# Patient Record
Sex: MALE | Race: WHITE | NOT HISPANIC OR LATINO | Employment: OTHER | URBAN - METROPOLITAN AREA
[De-identification: names, ages, dates, MRNs, and addresses within clinical notes are randomized per-mention and may not be internally consistent; named-entity substitution may affect disease eponyms.]

---

## 2022-04-10 ENCOUNTER — HOSPITAL ENCOUNTER (EMERGENCY)
Facility: HOSPITAL | Age: 87
Discharge: HOME | End: 2022-04-10
Attending: EMERGENCY MEDICINE
Payer: MEDICARE

## 2022-04-10 VITALS
DIASTOLIC BLOOD PRESSURE: 89 MMHG | RESPIRATION RATE: 16 BRPM | HEART RATE: 90 BPM | TEMPERATURE: 98.2 F | SYSTOLIC BLOOD PRESSURE: 149 MMHG | OXYGEN SATURATION: 97 %

## 2022-04-10 DIAGNOSIS — L98.9 SKIN LESION OF LEFT LEG: Primary | ICD-10-CM

## 2022-04-10 DIAGNOSIS — R35.0 URINARY FREQUENCY: ICD-10-CM

## 2022-04-10 DIAGNOSIS — I87.2 VENOUS STASIS DERMATITIS OF BOTH LOWER EXTREMITIES: ICD-10-CM

## 2022-04-10 DIAGNOSIS — R82.81 PYURIA: ICD-10-CM

## 2022-04-10 DIAGNOSIS — H61.20 CERUMEN IMPACTION: ICD-10-CM

## 2022-04-10 DIAGNOSIS — R73.9 HYPERGLYCEMIA: ICD-10-CM

## 2022-04-10 LAB
ALBUMIN SERPL BCP-MCNC: 3 G/DL (ref 3.5–5)
ALP SERPL-CCNC: 93 U/L (ref 46–116)
ALT SERPL W P-5'-P-CCNC: 28 U/L (ref 12–78)
ANION GAP SERPL CALCULATED.3IONS-SCNC: 6 MMOL/L (ref 4–13)
AST SERPL W P-5'-P-CCNC: 44 U/L (ref 5–45)
BACTERIA UR QL AUTO: ABNORMAL /HPF
BASOPHILS # BLD AUTO: 0.09 THOUSANDS/ΜL (ref 0–0.1)
BASOPHILS NFR BLD AUTO: 1 % (ref 0–1)
BILIRUB SERPL-MCNC: 0.77 MG/DL (ref 0.2–1)
BILIRUB UR QL STRIP: NEGATIVE
BUN SERPL-MCNC: 12 MG/DL (ref 5–25)
CALCIUM ALBUM COR SERPL-MCNC: 9.5 MG/DL (ref 8.3–10.1)
CALCIUM SERPL-MCNC: 8.7 MG/DL (ref 8.3–10.1)
CHLORIDE SERPL-SCNC: 103 MMOL/L (ref 100–108)
CLARITY UR: ABNORMAL
CO2 SERPL-SCNC: 29 MMOL/L (ref 21–32)
COLOR UR: YELLOW
CREAT SERPL-MCNC: 0.96 MG/DL (ref 0.6–1.3)
EOSINOPHIL # BLD AUTO: 0.34 THOUSAND/ΜL (ref 0–0.61)
EOSINOPHIL NFR BLD AUTO: 4 % (ref 0–6)
ERYTHROCYTE [DISTWIDTH] IN BLOOD BY AUTOMATED COUNT: 13.2 % (ref 11.6–15.1)
GFR SERPL CREATININE-BSD FRML MDRD: 71 ML/MIN/1.73SQ M
GLUCOSE SERPL-MCNC: 176 MG/DL (ref 65–140)
GLUCOSE UR STRIP-MCNC: NEGATIVE MG/DL
HCT VFR BLD AUTO: 51.6 % (ref 36.5–49.3)
HGB BLD-MCNC: 16.8 G/DL (ref 12–17)
HGB UR QL STRIP.AUTO: ABNORMAL
IMM GRANULOCYTES # BLD AUTO: 0.09 THOUSAND/UL (ref 0–0.2)
IMM GRANULOCYTES NFR BLD AUTO: 1 % (ref 0–2)
KETONES UR STRIP-MCNC: NEGATIVE MG/DL
LEUKOCYTE ESTERASE UR QL STRIP: ABNORMAL
LYMPHOCYTES # BLD AUTO: 2.25 THOUSANDS/ΜL (ref 0.6–4.47)
LYMPHOCYTES NFR BLD AUTO: 23 % (ref 14–44)
MCH RBC QN AUTO: 29.9 PG (ref 26.8–34.3)
MCHC RBC AUTO-ENTMCNC: 32.6 G/DL (ref 31.4–37.4)
MCV RBC AUTO: 92 FL (ref 82–98)
MONOCYTES # BLD AUTO: 0.69 THOUSAND/ΜL (ref 0.17–1.22)
MONOCYTES NFR BLD AUTO: 7 % (ref 4–12)
NEUTROPHILS # BLD AUTO: 6.22 THOUSANDS/ΜL (ref 1.85–7.62)
NEUTS SEG NFR BLD AUTO: 64 % (ref 43–75)
NITRITE UR QL STRIP: POSITIVE
NON-SQ EPI CELLS URNS QL MICRO: ABNORMAL /HPF
NRBC BLD AUTO-RTO: 0 /100 WBCS
PH UR STRIP.AUTO: 8 [PH] (ref 4.5–8)
PLATELET # BLD AUTO: 250 THOUSANDS/UL (ref 149–390)
PMV BLD AUTO: 10.6 FL (ref 8.9–12.7)
POTASSIUM SERPL-SCNC: 4.1 MMOL/L (ref 3.5–5.3)
PROT SERPL-MCNC: 7.4 G/DL (ref 6.4–8.2)
PROT UR STRIP-MCNC: ABNORMAL MG/DL
RBC # BLD AUTO: 5.61 MILLION/UL (ref 3.88–5.62)
RBC #/AREA URNS AUTO: ABNORMAL /HPF
SODIUM SERPL-SCNC: 138 MMOL/L (ref 136–145)
SP GR UR STRIP.AUTO: 1.02 (ref 1–1.03)
UROBILINOGEN UR QL STRIP.AUTO: 0.2 E.U./DL
WBC # BLD AUTO: 9.68 THOUSAND/UL (ref 4.31–10.16)
WBC #/AREA URNS AUTO: ABNORMAL /HPF

## 2022-04-10 PROCEDURE — 80053 COMPREHEN METABOLIC PANEL: CPT | Performed by: PHYSICIAN ASSISTANT

## 2022-04-10 PROCEDURE — 87086 URINE CULTURE/COLONY COUNT: CPT

## 2022-04-10 PROCEDURE — 36415 COLL VENOUS BLD VENIPUNCTURE: CPT | Performed by: PHYSICIAN ASSISTANT

## 2022-04-10 PROCEDURE — 85025 COMPLETE CBC W/AUTO DIFF WBC: CPT | Performed by: PHYSICIAN ASSISTANT

## 2022-04-10 PROCEDURE — 99284 EMERGENCY DEPT VISIT MOD MDM: CPT | Performed by: PHYSICIAN ASSISTANT

## 2022-04-10 PROCEDURE — 99283 EMERGENCY DEPT VISIT LOW MDM: CPT

## 2022-04-10 PROCEDURE — 81001 URINALYSIS AUTO W/SCOPE: CPT

## 2022-04-10 RX ORDER — CEPHALEXIN 500 MG/1
500 CAPSULE ORAL EVERY 12 HOURS SCHEDULED
Qty: 10 CAPSULE | Refills: 0 | Status: SHIPPED | OUTPATIENT
Start: 2022-04-10 | End: 2022-04-15

## 2022-04-10 NOTE — DISCHARGE INSTRUCTIONS
Please refer to the attached information for strict return instructions  If symptoms worsen or new symptoms develop please return to the ER  Use prescribed antibiotic for full course  Please follow up with a primary care physician for re-evaluation of symptoms  Please schedule follow up with dermatology as soon as possible for possible biopsy of leg wound

## 2022-04-10 NOTE — ED PROVIDER NOTES
History  Chief Complaint   Patient presents with    Wound Check     pt has wound on left calf, states it has been there quite awhile  "it used to be a mole" states it is draining white/black  also has swelling bilaterally  pt has not taken any medications in two years, denies chest pain at this time  Mariangel Gomez is an 81 yo M presenting with lesion to L lower lower as well as chronic skin discoloration/thickening of bilateral lower legs/feet  He reports he has had a "mole" to L lower leg since he was a child, and notes it had been unchanged in appearance until late last year  He reports he used a "wart removing" topical medication OTC to area last year which did not seem to remove the lesion  He notes since that time it has been gradually enlarging and becoming more irregular in appearance  He notes occasional associated mild pain to area, none at this time  He reports he has also had intermittent, clear drainage from this lesion  No fevers/chills  No weight changes  No night sweats  He also reports chronic skin discoloration, thickening, and occasional intermittent swelling to bilateral lower legs  He notes this has been occurring for several years  The swelling is symmetric and tends to worsen with being upright  No recent injury/trauma  No current lower leg/foot pain, numbness, or tingling  He additionally reports urinary frequency, also chronic in nature  He reports sometimes needing to use the bathroom 1-2 times per hour and waking up frequently overnight for same  Denies dysuria or gross hematuria  Denies back/flank pain  He finally reports after inserting qtip into R ear canal several days ago he feels as though his hearing has been diminished on the R side  Denies pain  No drainage/discharge        History provided by:  Patient and relative   used: No        None       Past Medical History:   Diagnosis Date    Hx of heart artery stent        Past Surgical History: Procedure Laterality Date    BACK SURGERY         History reviewed  No pertinent family history  I have reviewed and agree with the history as documented  E-Cigarette/Vaping     E-Cigarette/Vaping Substances     Social History     Tobacco Use    Smoking status: Former Smoker    Smokeless tobacco: Not on file   Substance Use Topics    Alcohol use: Never    Drug use: Never       Review of Systems   Constitutional: Negative for chills and fever  HENT: Positive for hearing loss  Negative for congestion, ear discharge, ear pain, rhinorrhea, sinus pressure, sinus pain and sore throat  Eyes: Negative for pain and visual disturbance  Respiratory: Negative for cough, shortness of breath and wheezing  Cardiovascular: Negative for chest pain and palpitations  Gastrointestinal: Negative for abdominal pain, nausea and vomiting  Genitourinary: Positive for frequency  Negative for dysuria, flank pain, hematuria, penile discharge, testicular pain and urgency  Musculoskeletal: Negative for back pain, neck pain and neck stiffness  Skin: Positive for color change and wound  Negative for rash  Neurological: Negative for dizziness, weakness, light-headedness and numbness  Physical Exam  Physical Exam  Constitutional:       General: He is not in acute distress  Appearance: He is well-developed  He is not diaphoretic  HENT:      Head: Normocephalic and atraumatic  Right Ear: External ear normal  There is impacted cerumen  Left Ear: External ear normal  There is impacted cerumen  Eyes:      Conjunctiva/sclera: Conjunctivae normal       Pupils: Pupils are equal, round, and reactive to light  Cardiovascular:      Rate and Rhythm: Normal rate and regular rhythm  Heart sounds: Normal heart sounds  No murmur heard  No friction rub  No gallop  Pulmonary:      Effort: Pulmonary effort is normal  No respiratory distress  Breath sounds: Normal breath sounds  No wheezing  Abdominal:      General: There is no distension  Palpations: Abdomen is soft  Tenderness: There is no abdominal tenderness  There is no right CVA tenderness, left CVA tenderness or guarding  Musculoskeletal:      Cervical back: Normal range of motion and neck supple  Lymphadenopathy:      Cervical: No cervical adenopathy  Skin:     General: Skin is warm and dry  Capillary Refill: Capillary refill takes less than 2 seconds  Findings: Lesion and rash present  No erythema  Comments: Thickening and symmetric discoloration to bilateral lower legs/feet consistent with venous stasis dermatitis  1-2+, symmetric b/l posterior tibial and dorsalis pedis pulses b/l  Brisk cap refill  Papular lesion to L lower leg with irregular borders and mild surrounding erythema and skin peeling  No increased warmth or tenderness  Neurological:      Mental Status: He is alert and oriented to person, place, and time  Motor: No abnormal muscle tone  Coordination: Coordination normal    Psychiatric:         Behavior: Behavior normal          Thought Content:  Thought content normal          Judgment: Judgment normal            Vital Signs  ED Triage Vitals [04/10/22 1206]   Temperature Pulse Respirations Blood Pressure SpO2   98 2 °F (36 8 °C) 102 16 (!) 209/134 97 %      Temp Source Heart Rate Source Patient Position - Orthostatic VS BP Location FiO2 (%)   Oral Monitor Sitting Right arm --      Pain Score       --           Vitals:    04/10/22 1206 04/10/22 1258   BP: (!) 209/134 149/89   Pulse: 102 90   Patient Position - Orthostatic VS: Sitting Sitting         Visual Acuity      ED Medications  Medications - No data to display    Diagnostic Studies  Results Reviewed     Procedure Component Value Units Date/Time    Urine Microscopic [[id number]]  (Abnormal) Collected: 04/10/22 1325    Lab Status: Final result Specimen: Urine, Clean Catch Updated: 04/10/22 1440     RBC, UA Innumerable /hpf WBC, UA 30-50 /hpf      Epithelial Cells Occasional /hpf      Bacteria, UA Occasional /hpf     Urine culture [[id number]] Collected: 04/10/22 1325    Lab Status:  In process Specimen: Urine, Clean Catch Updated: 04/10/22 1440    Comprehensive metabolic panel [[id number]]  (Abnormal) Collected: 04/10/22 1314    Lab Status: Final result Specimen: Blood from Arm, Right Updated: 04/10/22 1339     Sodium 138 mmol/L      Potassium 4 1 mmol/L      Chloride 103 mmol/L      CO2 29 mmol/L      ANION GAP 6 mmol/L      BUN 12 mg/dL      Creatinine 0 96 mg/dL      Glucose 176 mg/dL      Calcium 8 7 mg/dL      Corrected Calcium 9 5 mg/dL      AST 44 U/L      ALT 28 U/L      Alkaline Phosphatase 93 U/L      Total Protein 7 4 g/dL      Albumin 3 0 g/dL      Total Bilirubin 0 77 mg/dL      eGFR 71 ml/min/1 73sq m     Narrative:      National Kidney Disease Foundation guidelines for Chronic Kidney Disease (CKD):     Stage 1 with normal or high GFR (GFR > 90 mL/min/1 73 square meters)    Stage 2 Mild CKD (GFR = 60-89 mL/min/1 73 square meters)    Stage 3A Moderate CKD (GFR = 45-59 mL/min/1 73 square meters)    Stage 3B Moderate CKD (GFR = 30-44 mL/min/1 73 square meters)    Stage 4 Severe CKD (GFR = 15-29 mL/min/1 73 square meters)    Stage 5 End Stage CKD (GFR <15 mL/min/1 73 square meters)  Note: GFR calculation is accurate only with a steady state creatinine    Urine Macroscopic, POC [[id number]]  (Abnormal) Collected: 04/10/22 1325    Lab Status: Final result Specimen: Urine Updated: 04/10/22 1326     Color, UA Yellow     Clarity, UA Slightly Cloudy     pH, UA 8 0     Leukocytes, UA Trace     Nitrite, UA Positive     Protein,  (2+) mg/dl      Glucose, UA Negative mg/dl      Ketones, UA Negative mg/dl      Urobilinogen, UA 0 2 E U /dl      Bilirubin, UA Negative     Blood, UA Large     Specific Gravity, UA 1 025    Narrative:      CLINITEK RESULT    CBC and differential [[id number]]  (Abnormal) Collected: 04/10/22 1314 Lab Status: Final result Specimen: Blood from Arm, Right Updated: 04/10/22 1320     WBC 9 68 Thousand/uL      RBC 5 61 Million/uL      Hemoglobin 16 8 g/dL      Hematocrit 51 6 %      MCV 92 fL      MCH 29 9 pg      MCHC 32 6 g/dL      RDW 13 2 %      MPV 10 6 fL      Platelets 734 Thousands/uL      nRBC 0 /100 WBCs      Neutrophils Relative 64 %      Immat GRANS % 1 %      Lymphocytes Relative 23 %      Monocytes Relative 7 %      Eosinophils Relative 4 %      Basophils Relative 1 %      Neutrophils Absolute 6 22 Thousands/µL      Immature Grans Absolute 0 09 Thousand/uL      Lymphocytes Absolute 2 25 Thousands/µL      Monocytes Absolute 0 69 Thousand/µL      Eosinophils Absolute 0 34 Thousand/µL      Basophils Absolute 0 09 Thousands/µL                  No orders to display              Procedures  Procedures         ED Course                                             MDM  Number of Diagnoses or Management Options  Cerumen impaction  Hyperglycemia  Pyuria  Skin lesion of left leg  Urinary frequency  Venous stasis dermatitis of both lower extremities  Diagnosis management comments: Chronic appearing skin changes c/w stasis dermatitis to b/l LE  Follow up with PCP recommended for same  Lesion to L lower leg with significant change in size and character since last year  This was discussed with Rea Peña, on call dermatology resident  Recommends may follow up with dermatology in office this week for consideration of biopsy - to call tomorrow to set this up  Patient informed of these recommendations as well as the possibility of this lesion being malignant  He verbalizes understanding of same and reports he is reluctant to follow up but understands the importance of seeing a dermatologist promptly and the risks if he does not  Pyuria noted on urine dip with +nitrites, 30-50 WBC's on urine micro along with RBC's  He does note ongoing urinary frequency however appears more chronic   Given pyuria, nitrites will provide keflex for possible UTI  Culture pending  Will provide debrox drops for partial cerumen impation  Portion of TM visualized and canal not fully occluded  Follow up with PCP encouraged  Amount and/or Complexity of Data Reviewed  Clinical lab tests: ordered and reviewed    Patient Progress  Patient progress: stable      Disposition  Final diagnoses:   Skin lesion of left leg   Venous stasis dermatitis of both lower extremities   Urinary frequency   Pyuria   Cerumen impaction   Hyperglycemia     Time reflects when diagnosis was documented in both MDM as applicable and the Disposition within this note     Time User Action Codes Description Comment    4/10/2022  2:19 PM Marcelene Specter Add [L98 9] Skin lesion of left leg     4/10/2022  2:19 PM Marcelene Specter Add [I87 2] Venous stasis dermatitis of both lower extremities     4/10/2022  2:19 PM Marcelene Specter Add [R35 0] Urinary frequency     4/10/2022  2:20 PM Marcelene Specter Add [R82 81] Pyuria     4/10/2022  2:21 PM Marcelene Specter Add [H61 20] Cerumen impaction     4/10/2022  8:03 PM Marcelene Specter Add [R73 9] Hyperglycemia       ED Disposition     ED Disposition Condition Date/Time Comment    Discharge Stable Sun Apr 10, 2022  2:19 PM Read James discharge to home/self care              Follow-up Information     Follow up With Specialties Details Why Contact Info Additional [address] [facility] Emergency Department Emergency Medicine  If symptoms worsen [facility] [phone] 313 [facility] Emergency Department, [address] [place] [address], 160 [facility] Dermatology Schedule an appointment as soon as possible for a visit   [address] [phone] Harper JACOME [address] [place] [address] 49 [facility] Family Medicine Schedule an appointment as soon as possible for a visit   [address] [facility] 305, [address] [facility] [id number]  [address] [facility] [address], [address] [facility] [address], [address], [address] [place] [address]          Discharge Medication List as of 4/10/2022  2:23 PM      START taking these medications    Details   carbamide peroxide (DEBROX) 6 5 % otic solution Administer 5 drops into ears 2 (two) times a day, Starting Sun 4/10/2022, Normal      cephalexin (KEFLEX) 500 mg capsule Take 1 capsule (500 mg total) by mouth every 12 (twelve) hours for 5 days, Starting Sun 4/10/2022, Until Fri 4/15/2022, Normal                 PDMP Review     None          ED Provider  Electronically Signed by           Joselito Castillo PA-C  04/10/22 2003

## 2022-04-13 LAB — BACTERIA UR CULT: NORMAL

## 2022-07-09 ENCOUNTER — APPOINTMENT (EMERGENCY)
Dept: RADIOLOGY | Facility: HOSPITAL | Age: 87
DRG: 602 | End: 2022-07-09
Payer: MEDICARE

## 2022-07-09 ENCOUNTER — HOSPITAL ENCOUNTER (INPATIENT)
Facility: HOSPITAL | Age: 87
LOS: 27 days | Discharge: NURSING, REHAB OR LONG-TERM CARE | DRG: 602 | End: 2022-08-05
Attending: EMERGENCY MEDICINE | Admitting: INTERNAL MEDICINE
Payer: MEDICARE

## 2022-07-09 ENCOUNTER — APPOINTMENT (EMERGENCY)
Dept: CT IMAGING | Facility: HOSPITAL | Age: 87
DRG: 602 | End: 2022-07-09
Payer: MEDICARE

## 2022-07-09 DIAGNOSIS — F32.A DEPRESSION: ICD-10-CM

## 2022-07-09 DIAGNOSIS — Z01.89 ENCOUNTER FOR ASSESSMENT OF DECISION-MAKING CAPACITY: ICD-10-CM

## 2022-07-09 DIAGNOSIS — R73.9 BLOOD GLUCOSE ELEVATED: ICD-10-CM

## 2022-07-09 DIAGNOSIS — I10 PRIMARY HYPERTENSION: ICD-10-CM

## 2022-07-09 DIAGNOSIS — E11.65 TYPE 2 DIABETES MELLITUS WITH HYPERGLYCEMIA, WITHOUT LONG-TERM CURRENT USE OF INSULIN (HCC): ICD-10-CM

## 2022-07-09 DIAGNOSIS — L03.90 CELLULITIS: ICD-10-CM

## 2022-07-09 DIAGNOSIS — A04.72 C. DIFFICILE COLITIS: ICD-10-CM

## 2022-07-09 DIAGNOSIS — C43.72 MALIGNANT MELANOMA OF LEG, LEFT (HCC): ICD-10-CM

## 2022-07-09 DIAGNOSIS — S81.802A WOUND OF LEFT LOWER EXTREMITY, INITIAL ENCOUNTER: Primary | ICD-10-CM

## 2022-07-09 PROBLEM — N17.9 AKI (ACUTE KIDNEY INJURY) (HCC): Status: ACTIVE | Noted: 2022-01-01

## 2022-07-09 PROBLEM — R22.40 LEG MASS: Status: ACTIVE | Noted: 2022-01-01

## 2022-07-09 LAB
ALBUMIN SERPL BCP-MCNC: 3.5 G/DL (ref 3.5–5)
ALP SERPL-CCNC: 79 U/L (ref 46–116)
ALT SERPL W P-5'-P-CCNC: 22 U/L (ref 12–78)
ANION GAP SERPL CALCULATED.3IONS-SCNC: 11 MMOL/L (ref 4–13)
APTT PPP: 33 SECONDS (ref 23–37)
AST SERPL W P-5'-P-CCNC: 19 U/L (ref 5–45)
BASOPHILS # BLD AUTO: 0.08 THOUSANDS/ΜL (ref 0–0.1)
BASOPHILS NFR BLD AUTO: 1 % (ref 0–1)
BILIRUB SERPL-MCNC: 0.96 MG/DL (ref 0.2–1)
BUN SERPL-MCNC: 17 MG/DL (ref 5–25)
CALCIUM SERPL-MCNC: 9 MG/DL (ref 8.3–10.1)
CHLORIDE SERPL-SCNC: 100 MMOL/L (ref 100–108)
CK SERPL-CCNC: 58 U/L (ref 39–308)
CO2 SERPL-SCNC: 30 MMOL/L (ref 21–32)
CREAT SERPL-MCNC: 1.56 MG/DL (ref 0.6–1.3)
EOSINOPHIL # BLD AUTO: 0.3 THOUSAND/ΜL (ref 0–0.61)
EOSINOPHIL NFR BLD AUTO: 3 % (ref 0–6)
ERYTHROCYTE [DISTWIDTH] IN BLOOD BY AUTOMATED COUNT: 13.4 % (ref 11.6–15.1)
EST. AVERAGE GLUCOSE BLD GHB EST-MCNC: 206 MG/DL
GFR SERPL CREATININE-BSD FRML MDRD: 39 ML/MIN/1.73SQ M
GLUCOSE SERPL-MCNC: 214 MG/DL (ref 65–140)
GLUCOSE SERPL-MCNC: 269 MG/DL (ref 65–140)
HBA1C MFR BLD: 8.8 %
HCT VFR BLD AUTO: 52.5 % (ref 36.5–49.3)
HGB BLD-MCNC: 17.1 G/DL (ref 12–17)
IMM GRANULOCYTES # BLD AUTO: 0.12 THOUSAND/UL (ref 0–0.2)
IMM GRANULOCYTES NFR BLD AUTO: 1 % (ref 0–2)
INR PPP: 1.04 (ref 0.84–1.19)
LACTATE SERPL-SCNC: 1.8 MMOL/L (ref 0.5–2)
LYMPHOCYTES # BLD AUTO: 1.93 THOUSANDS/ΜL (ref 0.6–4.47)
LYMPHOCYTES NFR BLD AUTO: 16 % (ref 14–44)
MCH RBC QN AUTO: 29.7 PG (ref 26.8–34.3)
MCHC RBC AUTO-ENTMCNC: 32.6 G/DL (ref 31.4–37.4)
MCV RBC AUTO: 91 FL (ref 82–98)
MONOCYTES # BLD AUTO: 0.94 THOUSAND/ΜL (ref 0.17–1.22)
MONOCYTES NFR BLD AUTO: 8 % (ref 4–12)
NEUTROPHILS # BLD AUTO: 8.68 THOUSANDS/ΜL (ref 1.85–7.62)
NEUTS SEG NFR BLD AUTO: 71 % (ref 43–75)
NRBC BLD AUTO-RTO: 0 /100 WBCS
PLATELET # BLD AUTO: 262 THOUSANDS/UL (ref 149–390)
PMV BLD AUTO: 10.9 FL (ref 8.9–12.7)
POTASSIUM SERPL-SCNC: 3.7 MMOL/L (ref 3.5–5.3)
PROT SERPL-MCNC: 8.7 G/DL (ref 6.4–8.2)
PROTHROMBIN TIME: 13.3 SECONDS (ref 11.6–14.5)
RBC # BLD AUTO: 5.76 MILLION/UL (ref 3.88–5.62)
SODIUM SERPL-SCNC: 141 MMOL/L (ref 136–145)
WBC # BLD AUTO: 12.05 THOUSAND/UL (ref 4.31–10.16)

## 2022-07-09 PROCEDURE — 99285 EMERGENCY DEPT VISIT HI MDM: CPT

## 2022-07-09 PROCEDURE — 85730 THROMBOPLASTIN TIME PARTIAL: CPT | Performed by: PHYSICIAN ASSISTANT

## 2022-07-09 PROCEDURE — 36415 COLL VENOUS BLD VENIPUNCTURE: CPT | Performed by: PHYSICIAN ASSISTANT

## 2022-07-09 PROCEDURE — 87040 BLOOD CULTURE FOR BACTERIA: CPT | Performed by: PHYSICIAN ASSISTANT

## 2022-07-09 PROCEDURE — 96375 TX/PRO/DX INJ NEW DRUG ADDON: CPT

## 2022-07-09 PROCEDURE — G1004 CDSM NDSC: HCPCS

## 2022-07-09 PROCEDURE — 99223 1ST HOSP IP/OBS HIGH 75: CPT | Performed by: INTERNAL MEDICINE

## 2022-07-09 PROCEDURE — 96366 THER/PROPH/DIAG IV INF ADDON: CPT

## 2022-07-09 PROCEDURE — 73590 X-RAY EXAM OF LOWER LEG: CPT

## 2022-07-09 PROCEDURE — 83036 HEMOGLOBIN GLYCOSYLATED A1C: CPT | Performed by: PHYSICIAN ASSISTANT

## 2022-07-09 PROCEDURE — 1123F ACP DISCUSS/DSCN MKR DOCD: CPT | Performed by: PHYSICIAN ASSISTANT

## 2022-07-09 PROCEDURE — 82550 ASSAY OF CK (CPK): CPT | Performed by: PHYSICIAN ASSISTANT

## 2022-07-09 PROCEDURE — 82948 REAGENT STRIP/BLOOD GLUCOSE: CPT

## 2022-07-09 PROCEDURE — 85025 COMPLETE CBC W/AUTO DIFF WBC: CPT | Performed by: PHYSICIAN ASSISTANT

## 2022-07-09 PROCEDURE — 99285 EMERGENCY DEPT VISIT HI MDM: CPT | Performed by: PHYSICIAN ASSISTANT

## 2022-07-09 PROCEDURE — 80053 COMPREHEN METABOLIC PANEL: CPT | Performed by: PHYSICIAN ASSISTANT

## 2022-07-09 PROCEDURE — 73700 CT LOWER EXTREMITY W/O DYE: CPT

## 2022-07-09 PROCEDURE — 85610 PROTHROMBIN TIME: CPT | Performed by: PHYSICIAN ASSISTANT

## 2022-07-09 PROCEDURE — 96365 THER/PROPH/DIAG IV INF INIT: CPT

## 2022-07-09 PROCEDURE — 83605 ASSAY OF LACTIC ACID: CPT | Performed by: PHYSICIAN ASSISTANT

## 2022-07-09 RX ORDER — INSULIN LISPRO 100 [IU]/ML
1-5 INJECTION, SOLUTION INTRAVENOUS; SUBCUTANEOUS
Status: DISCONTINUED | OUTPATIENT
Start: 2022-07-09 | End: 2022-07-19

## 2022-07-09 RX ORDER — SODIUM CHLORIDE 9 MG/ML
75 INJECTION, SOLUTION INTRAVENOUS CONTINUOUS
Status: DISCONTINUED | OUTPATIENT
Start: 2022-07-09 | End: 2022-07-10

## 2022-07-09 RX ORDER — HEPARIN SODIUM 5000 [USP'U]/ML
5000 INJECTION, SOLUTION INTRAVENOUS; SUBCUTANEOUS EVERY 8 HOURS SCHEDULED
Status: DISCONTINUED | OUTPATIENT
Start: 2022-07-09 | End: 2022-08-05 | Stop reason: HOSPADM

## 2022-07-09 RX ORDER — ONDANSETRON 2 MG/ML
4 INJECTION INTRAMUSCULAR; INTRAVENOUS EVERY 6 HOURS PRN
Status: DISCONTINUED | OUTPATIENT
Start: 2022-07-09 | End: 2022-07-21

## 2022-07-09 RX ORDER — HYDROMORPHONE HCL IN WATER/PF 6 MG/30 ML
0.2 PATIENT CONTROLLED ANALGESIA SYRINGE INTRAVENOUS EVERY 4 HOURS PRN
Status: DISCONTINUED | OUTPATIENT
Start: 2022-07-09 | End: 2022-07-09

## 2022-07-09 RX ORDER — HYDROMORPHONE HCL IN WATER/PF 6 MG/30 ML
0.2 PATIENT CONTROLLED ANALGESIA SYRINGE INTRAVENOUS EVERY 4 HOURS PRN
Status: DISCONTINUED | OUTPATIENT
Start: 2022-07-09 | End: 2022-07-19

## 2022-07-09 RX ORDER — OXYCODONE HYDROCHLORIDE 5 MG/1
2.5 TABLET ORAL EVERY 8 HOURS PRN
Status: DISCONTINUED | OUTPATIENT
Start: 2022-07-09 | End: 2022-08-05 | Stop reason: HOSPADM

## 2022-07-09 RX ORDER — ACETAMINOPHEN 325 MG/1
650 TABLET ORAL EVERY 6 HOURS PRN
Status: DISCONTINUED | OUTPATIENT
Start: 2022-07-09 | End: 2022-08-05 | Stop reason: HOSPADM

## 2022-07-09 RX ORDER — INSULIN LISPRO 100 [IU]/ML
1-5 INJECTION, SOLUTION INTRAVENOUS; SUBCUTANEOUS
Status: DISCONTINUED | OUTPATIENT
Start: 2022-07-10 | End: 2022-07-19

## 2022-07-09 RX ADMIN — PIPERACILLIN AND TAZOBACTAM 3.38 G: 3; .375 INJECTION, POWDER, LYOPHILIZED, FOR SOLUTION INTRAVENOUS at 22:57

## 2022-07-09 RX ADMIN — PIPERACILLIN SODIUM AND TAZOBACTAM SODIUM 3.38 G: 36; 4.5 INJECTION, POWDER, LYOPHILIZED, FOR SOLUTION INTRAVENOUS at 13:13

## 2022-07-09 RX ADMIN — MORPHINE SULFATE 2 MG: 2 INJECTION, SOLUTION INTRAMUSCULAR; INTRAVENOUS at 15:00

## 2022-07-09 RX ADMIN — SODIUM CHLORIDE 500 ML: 0.9 INJECTION, SOLUTION INTRAVENOUS at 13:14

## 2022-07-09 RX ADMIN — HYDROMORPHONE HYDROCHLORIDE 0.2 MG: 0.2 INJECTION, SOLUTION INTRAMUSCULAR; INTRAVENOUS; SUBCUTANEOUS at 21:44

## 2022-07-09 RX ADMIN — HEPARIN SODIUM 5000 UNITS: 5000 INJECTION INTRAVENOUS; SUBCUTANEOUS at 22:52

## 2022-07-09 RX ADMIN — OXYCODONE 2.5 MG: 5 TABLET ORAL at 22:57

## 2022-07-09 RX ADMIN — VANCOMYCIN HYDROCHLORIDE 1750 MG: 1 INJECTION, POWDER, LYOPHILIZED, FOR SOLUTION INTRAVENOUS at 23:56

## 2022-07-09 RX ADMIN — INSULIN LISPRO 2 UNITS: 100 INJECTION, SOLUTION INTRAVENOUS; SUBCUTANEOUS at 22:51

## 2022-07-09 RX ADMIN — SODIUM CHLORIDE 75 ML/HR: 0.9 INJECTION, SOLUTION INTRAVENOUS at 22:44

## 2022-07-09 NOTE — CONSULTS
Consultation - General Surgery  Iman Chisholm 82 y o  male MRN: [id number]  Unit/Bed#: ED 24 Encounter: [id number]        Assessment/Plan     Assessment:  Iman Chisholm is a 82 y o  male with PMH of cardiac stent and left lower leg skin lesion who presents with worsening pain LLE    Afebrile  Hypertensive  On Room air    WBC 12  Lactic acid 1 8    PE: venous stasis BL lower extremity, left leg with approximately 3 cm by 2 cm soft tissue mass, tender, cellulitis medial lower leg, no fluctuance or induration          Plan:   Recommend IV antibiotics to treat cellulitis   No indication for surgical debridement of cellulitis   Soft tissue mass will require biopsy when cellulitis resolves, refrain from biopsy until active infection clears    History of Present Illness     HPI:  Iman Chisholm is a 82 y o  male with above PMH who originally presented April of this year with complaints of left lower extremity soft tissue mass  Outpatient dermatology follow up was recommended for biopsy  Patient lost to follow up  Presents today with left lower leg pain  Per patient he wraps his left leg  On initial exam by ED maggots were seen when left leg dressing removed  Per family patient does not change his dressing and he lives alone  Family required to change dressing every 2 weeks when they come to visit patient  Upon discussion patient still in significant pain LLE  Denies fever, chills, nausea, or vomiting  Review of Systems   Constitutional: Negative for chills and fever  HENT: Negative for ear pain and sore throat  Eyes: Negative for pain and visual disturbance  Respiratory: Negative for cough and shortness of breath  Cardiovascular: Negative for chest pain and palpitations  Gastrointestinal: Negative for abdominal pain and vomiting  Genitourinary: Negative for dysuria and hematuria  Musculoskeletal: Negative for arthralgias and back pain     Skin: Positive for color change and wound  Negative for rash  Neurological: Negative for seizures and syncope  All other systems reviewed and are negative  Historical Information   Past Medical History:   Diagnosis Date    Hx of heart artery stent      Past Surgical History:   Procedure Laterality Date    BACK SURGERY       Social History   Social History     Substance and Sexual Activity   Alcohol Use Never     Social History     Substance and Sexual Activity   Drug Use Never     Social History     Tobacco Use   Smoking Status Former Smoker   Smokeless Tobacco Never Used     Family History: History reviewed  No pertinent family history  Meds/Allergies   all medications and allergies reviewed  No Known Allergies    Objective   First Vitals:   Blood Pressure: 168/77 (07/09/22 1100)  Pulse: 87 (07/09/22 1100)  Temperature: 97 9 °F (36 6 °C) (07/09/22 1100)  Temp Source: Oral (07/09/22 1100)  Respirations: 18 (07/09/22 1100)  SpO2: 97 % (07/09/22 1100)    Current Vitals:   Blood Pressure: (!) 196/91 (07/09/22 1457)  Pulse: 80 (07/09/22 1457)  Temperature: 97 9 °F (36 6 °C) (07/09/22 1100)  Temp Source: Oral (07/09/22 1100)  Respirations: 16 (07/09/22 1457)  SpO2: 97 % (07/09/22 1457)    No intake or output data in the 24 hours ending 07/09/22 1648    Invasive Devices  Report    Peripheral Intravenous Line  Duration           Peripheral IV 07/09/22 Right Antecubital <1 day                Physical Exam  Constitutional:       General: He is not in acute distress  Appearance: Normal appearance  He is not ill-appearing or toxic-appearing  HENT:      Head: Normocephalic and atraumatic  Right Ear: External ear normal       Left Ear: External ear normal       Nose: Nose normal       Mouth/Throat:      Mouth: Mucous membranes are moist    Eyes:      Pupils: Pupils are equal, round, and reactive to light  Cardiovascular:      Rate and Rhythm: Normal rate  Pulses: Normal pulses  Heart sounds: No murmur heard    Pulmonary: Effort: Pulmonary effort is normal  No respiratory distress  Abdominal:      General: Abdomen is flat  There is no distension  Tenderness: There is no abdominal tenderness  Musculoskeletal:         General: No swelling or tenderness  Normal range of motion  Cervical back: Normal range of motion  No rigidity  Skin:     General: Skin is warm and dry  Capillary Refill: Capillary refill takes less than 2 seconds  Findings: Lesion present  Comments: See assessment for skin exam   Neurological:      General: No focal deficit present  Mental Status: He is alert and oriented to person, place, and time  Psychiatric:         Mood and Affect: Mood normal          Behavior: Behavior normal            Lab Results: I have personally reviewed pertinent lab results  Imaging: I have personally reviewed pertinent reports  EKG, Pathology, and Other Studies: I have personally reviewed pertinent reports        Code Status: No Order  Advance Directive and Living Will:      Power of :    POLST:

## 2022-07-10 ENCOUNTER — APPOINTMENT (INPATIENT)
Dept: ULTRASOUND IMAGING | Facility: HOSPITAL | Age: 87
DRG: 602 | End: 2022-07-10
Payer: MEDICARE

## 2022-07-10 PROBLEM — E11.65 TYPE 2 DIABETES MELLITUS WITH HYPERGLYCEMIA, WITHOUT LONG-TERM CURRENT USE OF INSULIN (HCC): Status: ACTIVE | Noted: 2022-01-01

## 2022-07-10 LAB
ANION GAP SERPL CALCULATED.3IONS-SCNC: 10 MMOL/L (ref 4–13)
BACTERIA UR QL AUTO: ABNORMAL /HPF
BILIRUB UR QL STRIP: NEGATIVE
BUN SERPL-MCNC: 15 MG/DL (ref 5–25)
CALCIUM SERPL-MCNC: 8.2 MG/DL (ref 8.3–10.1)
CHLORIDE SERPL-SCNC: 104 MMOL/L (ref 100–108)
CLARITY UR: CLEAR
CO2 SERPL-SCNC: 28 MMOL/L (ref 21–32)
COLOR UR: YELLOW
CREAT SERPL-MCNC: 1.23 MG/DL (ref 0.6–1.3)
ERYTHROCYTE [DISTWIDTH] IN BLOOD BY AUTOMATED COUNT: 13.2 % (ref 11.6–15.1)
GFR SERPL CREATININE-BSD FRML MDRD: 53 ML/MIN/1.73SQ M
GLUCOSE SERPL-MCNC: 139 MG/DL (ref 65–140)
GLUCOSE SERPL-MCNC: 140 MG/DL (ref 65–140)
GLUCOSE SERPL-MCNC: 141 MG/DL (ref 65–140)
GLUCOSE SERPL-MCNC: 144 MG/DL (ref 65–140)
GLUCOSE SERPL-MCNC: 166 MG/DL (ref 65–140)
GLUCOSE SERPL-MCNC: 171 MG/DL (ref 65–140)
GLUCOSE UR STRIP-MCNC: NEGATIVE MG/DL
HCT VFR BLD AUTO: 50.1 % (ref 36.5–49.3)
HGB BLD-MCNC: 16.2 G/DL (ref 12–17)
HGB UR QL STRIP.AUTO: ABNORMAL
KETONES UR STRIP-MCNC: NEGATIVE MG/DL
LEUKOCYTE ESTERASE UR QL STRIP: NEGATIVE
MCH RBC QN AUTO: 29.7 PG (ref 26.8–34.3)
MCHC RBC AUTO-ENTMCNC: 32.3 G/DL (ref 31.4–37.4)
MCV RBC AUTO: 92 FL (ref 82–98)
NITRITE UR QL STRIP: NEGATIVE
NON-SQ EPI CELLS URNS QL MICRO: ABNORMAL /HPF
PH UR STRIP.AUTO: 6 [PH]
PLATELET # BLD AUTO: 243 THOUSANDS/UL (ref 149–390)
PMV BLD AUTO: 10.5 FL (ref 8.9–12.7)
POTASSIUM SERPL-SCNC: 3.4 MMOL/L (ref 3.5–5.3)
PROT UR STRIP-MCNC: NEGATIVE MG/DL
RBC # BLD AUTO: 5.46 MILLION/UL (ref 3.88–5.62)
RBC #/AREA URNS AUTO: ABNORMAL /HPF
SODIUM SERPL-SCNC: 142 MMOL/L (ref 136–145)
SP GR UR STRIP.AUTO: 1.02 (ref 1–1.03)
UROBILINOGEN UR QL STRIP.AUTO: 0.2 E.U./DL
WBC # BLD AUTO: 11.6 THOUSAND/UL (ref 4.31–10.16)
WBC #/AREA URNS AUTO: ABNORMAL /HPF

## 2022-07-10 PROCEDURE — 81001 URINALYSIS AUTO W/SCOPE: CPT | Performed by: INTERNAL MEDICINE

## 2022-07-10 PROCEDURE — NC001 PR NO CHARGE: Performed by: SURGERY

## 2022-07-10 PROCEDURE — 85027 COMPLETE CBC AUTOMATED: CPT | Performed by: INTERNAL MEDICINE

## 2022-07-10 PROCEDURE — 99222 1ST HOSP IP/OBS MODERATE 55: CPT | Performed by: SURGERY

## 2022-07-10 PROCEDURE — 80048 BASIC METABOLIC PNL TOTAL CA: CPT | Performed by: INTERNAL MEDICINE

## 2022-07-10 PROCEDURE — 76770 US EXAM ABDO BACK WALL COMP: CPT

## 2022-07-10 PROCEDURE — 99232 SBSQ HOSP IP/OBS MODERATE 35: CPT | Performed by: PHYSICIAN ASSISTANT

## 2022-07-10 PROCEDURE — 82948 REAGENT STRIP/BLOOD GLUCOSE: CPT

## 2022-07-10 RX ADMIN — VANCOMYCIN HYDROCHLORIDE 750 MG: 750 INJECTION, SOLUTION INTRAVENOUS at 23:03

## 2022-07-10 RX ADMIN — HYDROMORPHONE HYDROCHLORIDE 0.2 MG: 0.2 INJECTION, SOLUTION INTRAMUSCULAR; INTRAVENOUS; SUBCUTANEOUS at 02:40

## 2022-07-10 RX ADMIN — OXYCODONE 2.5 MG: 5 TABLET ORAL at 19:09

## 2022-07-10 RX ADMIN — HEPARIN SODIUM 5000 UNITS: 5000 INJECTION INTRAVENOUS; SUBCUTANEOUS at 13:05

## 2022-07-10 RX ADMIN — PIPERACILLIN AND TAZOBACTAM 3.38 G: 3; .375 INJECTION, POWDER, LYOPHILIZED, FOR SOLUTION INTRAVENOUS at 15:56

## 2022-07-10 RX ADMIN — INSULIN LISPRO 1 UNITS: 100 INJECTION, SOLUTION INTRAVENOUS; SUBCUTANEOUS at 11:32

## 2022-07-10 RX ADMIN — OXYCODONE 2.5 MG: 5 TABLET ORAL at 13:05

## 2022-07-10 RX ADMIN — HYDROMORPHONE HYDROCHLORIDE 0.2 MG: 0.2 INJECTION, SOLUTION INTRAMUSCULAR; INTRAVENOUS; SUBCUTANEOUS at 22:02

## 2022-07-10 RX ADMIN — PIPERACILLIN AND TAZOBACTAM 3.38 G: 3; .375 INJECTION, POWDER, LYOPHILIZED, FOR SOLUTION INTRAVENOUS at 06:03

## 2022-07-10 RX ADMIN — HEPARIN SODIUM 5000 UNITS: 5000 INJECTION INTRAVENOUS; SUBCUTANEOUS at 05:20

## 2022-07-10 RX ADMIN — PIPERACILLIN AND TAZOBACTAM 3.38 G: 3; .375 INJECTION, POWDER, LYOPHILIZED, FOR SOLUTION INTRAVENOUS at 22:06

## 2022-07-10 RX ADMIN — HEPARIN SODIUM 5000 UNITS: 5000 INJECTION INTRAVENOUS; SUBCUTANEOUS at 22:02

## 2022-07-10 NOTE — PROGRESS NOTES
Progress Note - General Surgery   Donald Munguia 82 y o  male MRN: [id number]  Unit/Bed#: E2 -01 Encounter: [id number]    Assessment:  Donald Munguia is a 82 y o  male LLE soft tissue mass and cellulitis     Afebrile, Stable VS on room air  PE: cellulitis stable, tender             Plan:  · IV antibiotics  · Follow blood cultures  · No indication for surgical debridement of cellulitis  · Soft tissue mass will require biopsy when cellulitis resolves, refrain from biopsy until active infection clears      Subjective/Objective     Subjective:   No acute events overnight  Continued pain LLE  Objective:     Blood pressure (!) 203/81, pulse 84, temperature 97 9 °F (36 6 °C), temperature source Oral, resp  rate 16, height 5' 9" (1 753 m), weight 91 5 kg (201 lb 11 5 oz), SpO2 100 %  ,Body mass index is 29 79 kg/m²      No intake or output data in the 24 hours ending 07/09/22 2007    Invasive Devices  Report    Peripheral Intravenous Line  Duration           Peripheral IV 07/09/22 Right Antecubital <1 day                Physical Exam:   Gen: NAD, Comfortable  Neuro: A&O, No focal deficits  Head: Normal Cephalic, Atraumatic  Eye: EOMI, PERRLA, No scleral icterus  Neck: Supple, No JVD, Midline trachea  CV: RRR, Cap refill <2 sec  Pulm: Normal work of breathing, no respiratory distress  Abd: Soft, Non-Distended, Non-Tender  Ext: No edema, Non-tender  Skin: cellulitis stable, tender

## 2022-07-10 NOTE — ASSESSMENT & PLAN NOTE
Left leg cellulitis with wounds  Maggots were found in the wound  Ct lower ext showed "Extensive subcutaneous edema likely representing cellulitis  Although evaluation for abscess is limited in the absence images contrast no obvious discrete collection is identified  Dermal soft tissue lesion seen in association with the posterior medial calf "  Given zosyn in the ed which will be continued and will add zosyn     Appreciate surgery recommendations

## 2022-07-10 NOTE — CASE MANAGEMENT
Case Management Discharge Planning Note    Patient name Carlos Vargas  Location [facility] 2 /E2 MS 5-* MRN [id number]  : 1935 Date 7/10/2022       Current Admission Date: 2022  Current Admission Diagnosis:Cellulitis   Patient Active Problem List    Diagnosis Date Noted    AKUA (acute kidney injury) ([facility] ) 2022    Cellulitis 2022    Leg mass 2022    Type 2 diabetes mellitus with hyperglycemia, without long-term current use of insulin ([facility] ) 2022      LOS (days): 1  Geometric Mean LOS (GMLOS) (days):   Days to GMLOS:     OBJECTIVE:  Risk of Unplanned Readmission Score: 8 64         Current admission status: Inpatient   Preferred Pharmacy:   [address] [place], [place] [address]  Phone: [phone] Fax: [phone]    Primary Care Provider: No primary care provider on file  Primary Insurance: MEDICARE  Secondary Insurance: [employer] [facility]    DISCHARGE DETAILS:                                          Other Referral/Resources/Interventions Provided:  Government Services[de-identified] Adult Protective Services / Elder Abuse (CM placed APS referral for concerns self-neglect d/t wound infested w/ maggots   Completed intake w/ on- Dolores

## 2022-07-10 NOTE — PROGRESS NOTES
[facility]  Progress Note - [place] Bello 1935, 82 y o  male MRN: [id number]  Unit/Bed#: E2 -01 Encounter: [id number]  Primary Care Provider: No primary care provider on file  Date and time admitted to hospital: 7/9/2022 11:02 AM    * Cellulitis  Assessment & Plan  Presented with left leg cellulitis, surrounding wounds, and growth on leg  Maggots were found in the wound upon presentation to the ED    Ct lower extremity: "Extensive subcutaneous edema likely representing cellulitis  Although evaluation for abscess is limited in the absence images contrast no obvious discrete collection is identified  Dermal soft tissue lesion seen in association with the posterior medial calf "    Currently on IV Zosyn and vancomycin-day # 2  Seen and evaluated by General surgery-mass can be biopsied when infection resolved  Blood cultures obtained and pending    Type 2 diabetes mellitus with hyperglycemia, without long-term current use of insulin ([facility])  Assessment & Plan  No history of diabetes  Blood glucose was 214 on bmp on admission  Hemoglobin A1c checked and is 8 8  Placed on insulin sliding scale here  Goal for better control of sugars in the setting of acute infection  Monitor sugars and consider long-acting insulin before bed    Leg mass  Assessment & Plan  Seen and evaluated by General surgery  Appears mass has been there for quite some time  Per surgery, can biopsy once cellulitis is improved   Pain control    AKUA (acute kidney injury) ([facility] [address] )  Assessment & Plan  Results from last 7 days   Lab Units 07/10/22  0517 07/09/22  1159   CREATININE mg/dL 1 23 1 56*   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  Baseline creatinine around 0 9  Presenting creatinine 1 56  Placed on IV fluids with improvement of creatinine  Renal ultrasound pending      VTE Pharmacologic Prophylaxis:   Pharmacologic: Heparin  Mechanical VTE Prophylaxis in Place: Yes    Discharge Plan:   With need for continued inpatient stay for IV antibiotics and control blood sugars  Likely another 24-48 hours    Discussions with Specialists or Other Care Team Provider:  Nursing    Education and Discussions with Family / Patient:  Patient, attempted to update daughter-unable to reach    Time Spent for Care: 30 minutes  More than 50% of total time spent on counseling and coordination of care as described above  Current Length of Stay: 1 day(s)  Current Patient Status: Inpatient   Code Status: Level 1 - Full Code    Subjective:   Patient resting in bed comfortably  Reports his leg was just re-wrapped this morning  Reviewed pictures under media tab  The pain is improving in his left leg  He lives at home alone  He is bothered by being on IV fluids and is requesting that they be stopped  Reports he is eating and drinking here  Attempted to update daughter via telephone-unable to reach  Objective:     Vitals:   Temp (24hrs), Av 7 °F (36 5 °C), Min:97 °F (36 1 °C), Max:98 4 °F (36 9 °C)    Temp:  [97 °F (36 1 °C)-98 4 °F (36 9 °C)] 97 °F (36 1 °C)  HR:  [80-85] 85  Resp:  [14-18] 18  BP: (166-207)/(78-91) 168/78  SpO2:  [95 %-100 %] 95 %  Body mass index is 29 79 kg/m²  Input and Output Summary (last 24 hours): Intake/Output Summary (Last 24 hours) at 7/10/2022 1259  Last data filed at 7/10/2022 1213  Gross per 24 hour   Intake --   Output 1075 ml   Net -1075 ml       Physical Exam:     Physical Exam  Vitals and nursing note reviewed  Constitutional:       General: He is not in acute distress  Appearance: Normal appearance  He is obese  He is not ill-appearing, toxic-appearing or diaphoretic  HENT:      Head: Normocephalic and atraumatic  Eyes:      General: No scleral icterus  Cardiovascular:      Rate and Rhythm: Normal rate and regular rhythm  Pulmonary:      Effort: Pulmonary effort is normal  No respiratory distress  Breath sounds: Normal breath sounds  No stridor  No wheezing or rhonchi     Abdominal: General: There is no distension  Palpations: There is no mass  Tenderness: There is no abdominal tenderness  Hernia: No hernia is present  Musculoskeletal:         General: No swelling  Cervical back: Neck supple  Comments: Reviewed image under media tab-left leg Ace wrapped upon evaluation   Skin:     General: Skin is warm and dry  Neurological:      Mental Status: He is alert and oriented to person, place, and time  Mental status is at baseline  Psychiatric:         Mood and Affect: Mood normal          Behavior: Behavior normal          Additional Data:     Labs:    Results from last 7 days   Lab Units 07/10/22  0517 07/09/22  1159   WBC Thousand/uL 11 60* 12 05*   HEMOGLOBIN g/dL 16 2 17 1*   HEMATOCRIT % 50 1* 52 5*   PLATELETS Thousands/uL 243 262   NEUTROS PCT %  --  71   LYMPHS PCT %  --  16   MONOS PCT %  --  8   EOS PCT %  --  3     Results from last 7 days   Lab Units 07/10/22  0517 07/09/22  1159   POTASSIUM mmol/L 3 4* 3 7   CHLORIDE mmol/L 104 100   CO2 mmol/L 28 30   BUN mg/dL 15 17   CREATININE mg/dL 1 23 1 56*   CALCIUM mg/dL 8 2* 9 0   ALK PHOS U/L  --  79   ALT U/L  --  22   AST U/L  --  19     Results from last 7 days   Lab Units 07/09/22  1159   INR  1 04       * I Have Reviewed All Lab Data Listed Above  * Additional Pertinent Lab Tests Reviewed: [facility]    Imaging:    Imaging Reports Reviewed Today Include:  CT lower extremity, tibia/fibula x-ray  Imaging Personally Reviewed by Myself Includes:      Recent Cultures (last 7 days):     Results from last 7 days   Lab Units 07/09/22  1159   BLOOD CULTURE  Received in Microbiology Lab  Culture in Progress  Received in Microbiology Lab  Culture in Progress         Last 24 Hours Medication List:   Current Facility-Administered Medications   Medication Dose Route Frequency Provider Last Rate    acetaminophen  650 mg Oral Q6H PRN Vanessa Kim DO      heparin (porcine)  5,000 Units Subcutaneous Q8H [address] Vanessa Ambron, DO      HYDROmorphone  0 2 mg Intravenous Q4H PRN Vanessa Ambron, DO      insulin lispro  1-5 Units Subcutaneous TID AC Vanessa Ambron, DO      insulin lispro  1-5 Units Subcutaneous HS Vanessa Ambron, DO      ondansetron  4 mg Intravenous Q6H PRN Vanessa Ambron, DO      oxyCODONE  2 5 mg Oral Q8H PRN Vanessa Ambron, DO      piperacillin-tazobactam  3 375 g Intravenous Q8H Vanessa Ambron, DO 3 375 g (07/10/22 0603)    vancomycin  750 mg Intravenous Q24H Haydee Gowers, DO          Today, Patient Was Seen By: Wendi Platt PA-C    ** Please Note: This note has been constructed using a voice recognition system   **

## 2022-07-10 NOTE — PLAN OF CARE
Problem: Potential for Falls  Goal: Patient will remain free of falls  Description: INTERVENTIONS:  - Educate patient/family on patient safety including physical limitations  - Instruct patient to call for assistance with activity   - Consult OT/PT to assist with strengthening/mobility   - Keep Call bell within reach  - Keep bed low and locked with side rails adjusted as appropriate  - Keep care items and personal belongings within reach  - Initiate and maintain comfort rounds  - Make Fall Risk Sign visible to staff  - Offer Toileting every 2  Hours, in advance of need  - Initiate/Maintain bed alarm  - Obtain necessary fall risk management equipment: bed alarm  - Apply yellow socks and bracelet for high fall risk patients  - Consider moving patient to room near nurses station  Outcome: Progressing     Problem: Prexisting or High Potential for Compromised Skin Integrity  Goal: Skin integrity is maintained or improved  Description: INTERVENTIONS:  - Identify patients at risk for skin breakdown  - Assess and monitor skin integrity  - Assess and monitor nutrition and hydration status  - Monitor labs   - Assess for incontinence   - Turn and reposition patient  - Assist with mobility/ambulation  - Relieve pressure over bony prominences  - Avoid friction and shearing  - Provide appropriate hygiene as needed including keeping skin clean and dry  - Evaluate need for skin moisturizer/barrier cream  - Collaborate with interdisciplinary team   - Patient/family teaching  - Consider wound care consult   Outcome: Progressing

## 2022-07-10 NOTE — PLAN OF CARE
Problem: Potential for Falls  Goal: Patient will remain free of falls  Description: INTERVENTIONS:  - Educate patient/family on patient safety including physical limitations  - Instruct patient to call for assistance with activity   - Consult OT/PT to assist with strengthening/mobility   - Keep Call bell within reach  - Keep bed low and locked with side rails adjusted as appropriate  - Keep care items and personal belongings within reach  - Initiate and maintain comfort rounds  - Make Fall Risk Sign visible to staff  - Offer Toileting every 2  Hours, in advance of need  - Initiate/Maintain bed alarm  - Obtain necessary fall risk management equipment: bed alarm  - Apply yellow socks and bracelet for high fall risk patients  - Consider moving patient to room near nurses station  7/9/2022 2150 by Harrison Craft RN  Outcome: Progressing  7/9/2022 2150 by Harrison Craft, RN  Outcome: Progressing

## 2022-07-10 NOTE — PLAN OF CARE
Problem: Potential for Falls  Goal: Patient will remain free of falls  Description: INTERVENTIONS:  - Educate patient/family on patient safety including physical limitations  - Instruct patient to call for assistance with activity   - Consult OT/PT to assist with strengthening/mobility   - Keep Call bell within reach  - Keep bed low and locked with side rails adjusted as appropriate  - Keep care items and personal belongings within reach  - Initiate and maintain comfort rounds  - Make Fall Risk Sign visible to staff  - Offer Toileting every 2 Hours, in advance of need  - Initiate/Maintain bed alarm  - Obtain necessary fall risk management equipment  - Apply yellow socks and bracelet for high fall risk patients  - Consider moving patient to room near nurses station  Outcome: Progressing     Problem: Prexisting or High Potential for Compromised Skin Integrity  Goal: Skin integrity is maintained or improved  Description: INTERVENTIONS:  - Identify patients at risk for skin breakdown  - Assess and monitor skin integrity  - Assess and monitor nutrition and hydration status  - Monitor labs   - Assess for incontinence   - Turn and reposition patient  - Assist with mobility/ambulation  - Relieve pressure over bony prominences  - Avoid friction and shearing  - Provide appropriate hygiene as needed including keeping skin clean and dry  - Evaluate need for skin moisturizer/barrier cream  - Collaborate with interdisciplinary team   - Patient/family teaching  - Consider wound care consult   Outcome: Progressing     Problem: MOBILITY - ADULT  Goal: Maintain or return to baseline ADL function  Description: INTERVENTIONS:  -  Assess patient's ability to carry out ADLs; assess patient's baseline for ADL function and identify physical deficits which impact ability to perform ADLs (bathing, care of mouth/teeth, toileting, grooming, dressing, etc )  - Assess/evaluate cause of self-care deficits   - Assess range of motion  - Assess patient's mobility; develop plan if impaired  - Assess patient's need for assistive devices and provide as appropriate  - Encourage maximum independence but intervene and supervise when necessary  - Involve family in performance of ADLs  - Assess for home care needs following discharge   - Consider OT consult to assist with ADL evaluation and planning for discharge  - Provide patient education as appropriate  Outcome: Progressing  Goal: Maintains/Returns to pre admission functional level  Description: INTERVENTIONS:  - Perform BMAT or MOVE assessment daily    - Set and communicate daily mobility goal to care team and patient/family/caregiver  - Collaborate with rehabilitation services on mobility goals if consulted  - Perform Range of Motion 2 times a day  - Reposition patient every 2 hours    - Dangle patient 2 times a day  - Stand patient 2 times a day  - Ambulate patient 2 times a day  - Out of bed to chair 2 times a day   - Out of bed for meals 2 times a day  - Out of bed for toileting  - Record patient progress and toleration of activity level   Outcome: Progressing

## 2022-07-10 NOTE — H&P
[address] [address] 1935, 82 y o  male MRN: [id number]  Unit/Bed#: E2 -01 Encounter: [id number]  Primary Care Provider: No primary care provider on file  Date and time admitted to hospital: 7/9/2022 11:02 AM    Hyperglycemia  Assessment & Plan  No history of diabetes  Blood glucose was 214 on bmp  Will write for insulin sliding scale  Check a1c    Leg mass  Assessment & Plan  Appreciate surgery recommendations  Will biopsy once cellulitis is improved   Pain control    AKUA (acute kidney injury) ([facility] [address] )  Assessment & Plan  On ckd2  Baseline creatinine is 0 9  Creatinine on admission is 1 5  Will write for iv fluids  Check pvr  Check ua  Check renal u/s    * Cellulitis  Assessment & Plan  Left leg cellulitis with wounds  Maggots were found in the wound  Ct lower ext showed "Extensive subcutaneous edema likely representing cellulitis  Although evaluation for abscess is limited in the absence images contrast no obvious discrete collection is identified  Dermal soft tissue lesion seen in association with the posterior medial calf "  Given zosyn in the ed which will be continued and will add vancomycin  Appreciate surgery recommendations           VTE Prophylaxis: Heparin  / sequential compression device   Code Status: full code      Anticipated Length of Stay:  Patient will be admitted on an Inpatient basis with an anticipated length of stay of  Greater than 2 midnights  Chief Complaint:   Leg swelling and redness  History of Present Illness:    Monico Wemes is a 82 y o  male with no medical history presents to the ed due to left lower ext swelling, pain, and redness  He also had a area on his left leg that was growing  He said it was a very small area prior and he thought it was wart  He tried to apply medication to remove the wart but it continued to grow  He was suppose to go to dermatology but had yet to be evaluated   The area was draining foul smelling material and when the bandage was removed in the ed maggots were noticed in the wound  Pt is also having pain in his leg  He reports it is an 8/10  Pts daughter expressed to the ed that pt has been making comments that he was not happy with his life  She was worried that he may require psychiatry to evaluate him  Pt denies suicidal ideation  He denies depressive symptoms  Review of Systems:    Review of Systems   Constitutional: Negative  Negative for chills, fatigue and fever  HENT: Negative  Eyes: Negative  Respiratory: Negative  Cardiovascular: Negative  Gastrointestinal: Negative  Endocrine: Negative  Genitourinary: Negative  Musculoskeletal: Negative  Skin: Positive for color change and wound  Allergic/Immunologic: Negative  Neurological: Negative  Hematological: Negative  Psychiatric/Behavioral: Negative  Past Medical and Surgical History:     Past Medical History:   Diagnosis Date    Hx of heart artery stent        Past Surgical History:   Procedure Laterality Date    BACK SURGERY         Meds/Allergies:    Prior to Admission medications    Medication Sig Start Date End Date Taking? Authorizing Provider   carbamide peroxide (DEBROX) 6 5 % otic solution Administer 5 drops into ears 2 (two) times a day  Patient not taking: Reported on 7/9/2022 4/10/22   Rhonda Melendez PA-C     I have reviewed home medications with patient personally      Allergies: No Known Allergies    Social History:     Marital Status: Single     Substance Use History:   Social History     Substance and Sexual Activity   Alcohol Use Never     Social History     Tobacco Use   Smoking Status Former Smoker   Smokeless Tobacco Never Used     Social History     Substance and Sexual Activity   Drug Use Never       Family History:    non-contributory    Physical Exam:     Vitals:   Blood Pressure: 166/82 (07/09/22 2043)  Pulse: 83 (07/09/22 2043)  Temperature: 98 4 °F (36 9 °C) (07/09/22 2043)  Temp Source: Temporal (07/09/22 2043)  Respirations: 18 (07/09/22 2043)  Height: 5' 9" (175 3 cm) (07/09/22 1929)  Weight - Scale: 91 5 kg (201 lb 11 5 oz) (07/09/22 1929)  SpO2: 96 % (07/09/22 2043)    Physical Exam  Constitutional:       Appearance: Normal appearance  HENT:      Head: Normocephalic and atraumatic  Eyes:      Extraocular Movements: Extraocular movements intact  Pupils: Pupils are equal, round, and reactive to light  Cardiovascular:      Rate and Rhythm: Normal rate and regular rhythm  Heart sounds: No murmur heard  No friction rub  No gallop  Pulmonary:      Effort: Pulmonary effort is normal  No respiratory distress  Breath sounds: Normal breath sounds  No wheezing or rales  Abdominal:      General: Bowel sounds are normal  There is no distension  Palpations: Abdomen is soft  Tenderness: There is no abdominal tenderness  There is no guarding  Musculoskeletal:      Right lower leg: No edema  Left lower leg: Edema present  Skin:     Findings: Lesion present  Comments: + mass left lower ext with erythema and open sores  Neurological:      Mental Status: He is alert and oriented to person, place, and time  Additional Data:     Lab Results: I have personally reviewed pertinent reports  Results from last 7 days   Lab Units 07/09/22  1159   WBC Thousand/uL 12 05*   HEMOGLOBIN g/dL 17 1*   HEMATOCRIT % 52 5*   PLATELETS Thousands/uL 262   NEUTROS PCT % 71   LYMPHS PCT % 16   MONOS PCT % 8   EOS PCT % 3     Results from last 7 days   Lab Units 07/09/22  1159   POTASSIUM mmol/L 3 7   CHLORIDE mmol/L 100   CO2 mmol/L 30   BUN mg/dL 17   CREATININE mg/dL 1 56*   CALCIUM mg/dL 9 0   ALK PHOS U/L 79   ALT U/L 22   AST U/L 19     Results from last 7 days   Lab Units 07/09/22  1159   INR  1 04       Imaging: I have personally reviewed pertinent reports        CT lower extremity wo contrast right    Result Date: 7/9/2022  Narrative: CT left lower extremity without IV contrast INDICATION: infection  COMPARISON: None  TECHNIQUE: CT examination of the above was performed  This examination was performed without intravenous contrast in the context of the critical nationwide Omnipaque shortage  This examination, like all CT scans performed in the [facility], was performed utilizing techniques to minimize radiation dose exposure, including the use of iterative reconstruction and automated exposure control software  Sagittal and coronal two dimensional reconstructed images were also submitted for interpretation  Rad dose  595 mGy-cm FINDINGS: OSSEOUS STRUCTURES:  No fracture, dislocation or destructive osseous lesion  VISUALIZED MUSCULATURE:  Unremarkable  SOFT TISSUES:  Extensive subcutaneous edema is identified  Evaluation for abscess is limited in the absence of intravenous contrast however no obvious discrete collection is identified  There is a raised 2 5 x 0 8 cm dermal lesion seen emanating from the posteromedial calf  OTHER PERTINENT FINDINGS:  None  Impression: Extensive subcutaneous edema likely representing cellulitis  Although evaluation for abscess is limited in the absence images contrast no obvious discrete collection is identified  Dermal soft tissue lesion seen in association with the posterior medial  calf  Workstation performed: [id number]       EKG, Pathology, and Other Studies Reviewed on Admission:   · EKG: none    Epic / Care Everywhere Records Reviewed:  Yes

## 2022-07-10 NOTE — ASSESSMENT & PLAN NOTE
Presented with left leg cellulitis, surrounding wounds, and growth on leg  Maggots were found in the wound upon presentation to the ED    Ct lower extremity: "Extensive subcutaneous edema likely representing cellulitis  Although evaluation for abscess is limited in the absence images contrast no obvious discrete collection is identified  Dermal soft tissue lesion seen in association with the posterior medial calf "    Currently on IV Zosyn and vancomycin-day # 2     Seen and evaluated by General surgery-mass can be biopsied when infection resolved  Blood cultures obtained and pending

## 2022-07-10 NOTE — ASSESSMENT & PLAN NOTE
On ckd2  Baseline creatinine is 0 9  Creatinine on admission is 1 5  Will write for iv fluids  Check pvr  Check ua  Check renal u/s

## 2022-07-10 NOTE — ASSESSMENT & PLAN NOTE
No history of diabetes  Blood glucose was 214 on bmp on admission  Hemoglobin A1c checked and is 8 8  Placed on insulin sliding scale here  Goal for better control of sugars in the setting of acute infection  Monitor sugars and consider long-acting insulin before bed

## 2022-07-10 NOTE — ASSESSMENT & PLAN NOTE
No history of diabetes  Blood glucose was 214 on bmp  Will write for insulin sliding scale  Check a1c

## 2022-07-10 NOTE — PROGRESS NOTES
Vancomycin Assessment    Hosea Scheuermann is a 82 y o  male who is currently receiving vancomycin 1750 mg IV once for skin-soft tissue infection     Relevant clinical data and objective history reviewed:  Creatinine   Date Value Ref Range Status   07/09/2022 1 56 (H) 0 60 - 1 30 mg/dL Final     Comment:     Standardized to IDMS reference method   04/10/2022 0 96 0 60 - 1 30 mg/dL Final     Comment:     Standardized to IDMS reference method     /82 (BP Location: Right arm)   Pulse 83   Temp 98 4 °F (36 9 °C) (Temporal)   Resp 18   Ht 5' 9" (1 753 m)   Wt 91 5 kg (201 lb 11 5 oz)   SpO2 96%   BMI 29 79 kg/m²   No intake/output data recorded  Lab Results   Component Value Date/Time    BUN 17 07/09/2022 11:59 AM    WBC 12 05 (H) 07/09/2022 11:59 AM    HGB 17 1 (H) 07/09/2022 11:59 AM    HCT 52 5 (H) 07/09/2022 11:59 AM    MCV 91 07/09/2022 11:59 AM     07/09/2022 11:59 AM     Temp Readings from Last 3 Encounters:   07/09/22 98 4 °F (36 9 °C) (Temporal)   04/10/22 98 2 °F (36 8 °C) (Oral)     Vancomycin Days of Therapy: 2    Assessment/Plan  The patient is currently on vancomycin utilizing scheduled dosing based on actual body weight  Baseline risks associated with therapy include: pre-existing renal impairment and advanced age  The patient is currently receiving 1750 mg IV once and after clinical evaluation will be changed to 750 mg IV every 24 hours  Pharmacy will also follow closely for s/sx of nephrotoxicity, infusion reactions, and appropriateness of therapy  BMP and CBC will be ordered per protocol  Plan for trough as patient approaches steady state, prior to the 4th  dose at approximately [id number] on 7/12/2022  Due to infection severity, will target a trough of 10-15 (mild infection/cellulitis)   Pharmacy will continue to follow the patients culture results and clinical progress daily      Roverto Feliz, Pharmacist

## 2022-07-10 NOTE — ASSESSMENT & PLAN NOTE
Seen and evaluated by General surgery  Appears mass has been there for quite some time  Per surgery, can biopsy once cellulitis is improved   Pain control

## 2022-07-10 NOTE — ASSESSMENT & PLAN NOTE
Results from last 7 days   Lab Units 07/10/22  0517 07/09/22  1159   CREATININE mg/dL 1 23 1 56*   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  Baseline creatinine around 0 9  Presenting creatinine 1 56  Placed on IV fluids with improvement of creatinine  Renal ultrasound pending

## 2022-07-11 LAB
ANION GAP SERPL CALCULATED.3IONS-SCNC: 9 MMOL/L (ref 4–13)
BUN SERPL-MCNC: 10 MG/DL (ref 5–25)
CALCIUM SERPL-MCNC: 8.1 MG/DL (ref 8.3–10.1)
CHLORIDE SERPL-SCNC: 105 MMOL/L (ref 100–108)
CO2 SERPL-SCNC: 28 MMOL/L (ref 21–32)
CREAT SERPL-MCNC: 1 MG/DL (ref 0.6–1.3)
ERYTHROCYTE [DISTWIDTH] IN BLOOD BY AUTOMATED COUNT: 13.1 % (ref 11.6–15.1)
GFR SERPL CREATININE-BSD FRML MDRD: 68 ML/MIN/1.73SQ M
GLUCOSE SERPL-MCNC: 124 MG/DL (ref 65–140)
GLUCOSE SERPL-MCNC: 130 MG/DL (ref 65–140)
GLUCOSE SERPL-MCNC: 132 MG/DL (ref 65–140)
GLUCOSE SERPL-MCNC: 147 MG/DL (ref 65–140)
GLUCOSE SERPL-MCNC: 151 MG/DL (ref 65–140)
HCT VFR BLD AUTO: 45.9 % (ref 36.5–49.3)
HGB BLD-MCNC: 15 G/DL (ref 12–17)
MCH RBC QN AUTO: 29.7 PG (ref 26.8–34.3)
MCHC RBC AUTO-ENTMCNC: 32.7 G/DL (ref 31.4–37.4)
MCV RBC AUTO: 91 FL (ref 82–98)
PLATELET # BLD AUTO: 237 THOUSANDS/UL (ref 149–390)
PMV BLD AUTO: 10.8 FL (ref 8.9–12.7)
POTASSIUM SERPL-SCNC: 3.4 MMOL/L (ref 3.5–5.3)
RBC # BLD AUTO: 5.05 MILLION/UL (ref 3.88–5.62)
SODIUM SERPL-SCNC: 142 MMOL/L (ref 136–145)
WBC # BLD AUTO: 8.56 THOUSAND/UL (ref 4.31–10.16)

## 2022-07-11 PROCEDURE — U0003 INFECTIOUS AGENT DETECTION BY NUCLEIC ACID (DNA OR RNA); SEVERE ACUTE RESPIRATORY SYNDROME CORONAVIRUS 2 (SARS-COV-2) (CORONAVIRUS DISEASE [COVID-19]), AMPLIFIED PROBE TECHNIQUE, MAKING USE OF HIGH THROUGHPUT TECHNOLOGIES AS DESCRIBED BY CMS-2020-01-R: HCPCS | Performed by: PHYSICIAN ASSISTANT

## 2022-07-11 PROCEDURE — 82948 REAGENT STRIP/BLOOD GLUCOSE: CPT

## 2022-07-11 PROCEDURE — U0005 INFEC AGEN DETEC AMPLI PROBE: HCPCS | Performed by: PHYSICIAN ASSISTANT

## 2022-07-11 PROCEDURE — 99232 SBSQ HOSP IP/OBS MODERATE 35: CPT | Performed by: INTERNAL MEDICINE

## 2022-07-11 PROCEDURE — 85027 COMPLETE CBC AUTOMATED: CPT | Performed by: PHYSICIAN ASSISTANT

## 2022-07-11 PROCEDURE — 80048 BASIC METABOLIC PNL TOTAL CA: CPT | Performed by: PHYSICIAN ASSISTANT

## 2022-07-11 PROCEDURE — 99232 SBSQ HOSP IP/OBS MODERATE 35: CPT | Performed by: SURGERY

## 2022-07-11 RX ORDER — HYDRALAZINE HYDROCHLORIDE 20 MG/ML
5 INJECTION INTRAMUSCULAR; INTRAVENOUS ONCE
Status: COMPLETED | OUTPATIENT
Start: 2022-07-11 | End: 2022-07-11

## 2022-07-11 RX ORDER — POTASSIUM CHLORIDE 20 MEQ/1
40 TABLET, EXTENDED RELEASE ORAL ONCE
Status: COMPLETED | OUTPATIENT
Start: 2022-07-11 | End: 2022-07-11

## 2022-07-11 RX ADMIN — OXYCODONE 2.5 MG: 5 TABLET ORAL at 03:06

## 2022-07-11 RX ADMIN — PIPERACILLIN AND TAZOBACTAM 3.38 G: 3; .375 INJECTION, POWDER, LYOPHILIZED, FOR SOLUTION INTRAVENOUS at 22:46

## 2022-07-11 RX ADMIN — HEPARIN SODIUM 5000 UNITS: 5000 INJECTION INTRAVENOUS; SUBCUTANEOUS at 05:52

## 2022-07-11 RX ADMIN — PIPERACILLIN AND TAZOBACTAM 3.38 G: 3; .375 INJECTION, POWDER, LYOPHILIZED, FOR SOLUTION INTRAVENOUS at 06:00

## 2022-07-11 RX ADMIN — HEPARIN SODIUM 5000 UNITS: 5000 INJECTION INTRAVENOUS; SUBCUTANEOUS at 21:58

## 2022-07-11 RX ADMIN — HYDRALAZINE HYDROCHLORIDE 5 MG: 20 INJECTION, SOLUTION INTRAMUSCULAR; INTRAVENOUS at 16:50

## 2022-07-11 RX ADMIN — POTASSIUM CHLORIDE 40 MEQ: 1500 TABLET, EXTENDED RELEASE ORAL at 09:37

## 2022-07-11 RX ADMIN — PIPERACILLIN AND TAZOBACTAM 3.38 G: 3; .375 INJECTION, POWDER, LYOPHILIZED, FOR SOLUTION INTRAVENOUS at 14:40

## 2022-07-11 RX ADMIN — VANCOMYCIN HYDROCHLORIDE 750 MG: 750 INJECTION, SOLUTION INTRAVENOUS at 23:42

## 2022-07-11 RX ADMIN — HEPARIN SODIUM 5000 UNITS: 5000 INJECTION INTRAVENOUS; SUBCUTANEOUS at 13:23

## 2022-07-11 RX ADMIN — ACETAMINOPHEN 650 MG: 325 TABLET, FILM COATED ORAL at 13:22

## 2022-07-11 RX ADMIN — INSULIN LISPRO 1 UNITS: 100 INJECTION, SOLUTION INTRAVENOUS; SUBCUTANEOUS at 11:03

## 2022-07-11 NOTE — DISCHARGE INSTR - OTHER ORDERS
Wound Care Plan:   1-Hydraguard lotion to right lower leg, bilateral buttocks, sacrum, and heels three times daily and as needed  2-Elevate heels off of bed/chair surface to offload pressure  3-Offloading air cushion in chair when out of bed  4-Moisturize skin daily with skin nourishing cream   5-Turn/reposition every 2 hours while in bed and weight shift frequently while in chair for pressure re-distribution on skin  6-Left leg--cleanse with normal saline, pat dry  Apply adaptic to open areas/mass  Cover with ABDs  Wrap with rolled gauze  Change dressing every other day and as needed with soilage  Follow-up at the [address]

## 2022-07-11 NOTE — ASSESSMENT & PLAN NOTE
· Presented with left leg cellulitis, surrounding wounds, and growth on leg  · Maggots were found in the wound upon presentation to the ED  · Ct lower extremity: "Extensive subcutaneous edema likely representing cellulitis  Although evaluation for abscess is limited in the absence images contrast no obvious discrete collection is identified    Dermal soft tissue lesion seen in association with the posterior medial calf "  · Currently on IV Zosyn and vancomycin-day # 3  · Seen and evaluated by General surgery-mass can be biopsied when infection resolved  · Blood cultures negative thus far

## 2022-07-11 NOTE — ASSESSMENT & PLAN NOTE
· Seen and evaluated by General surgery  · Appears mass has been there for quite some time  · Per surgery, can biopsy once cellulitis is improved   · Pain control

## 2022-07-11 NOTE — PROGRESS NOTES
Vancomycin IV Pharmacy-to-Dose Consultation    Osiris Jerome is a 82 y o  male who is currently receiving Vancomycin IV with management by the Pharmacy Consult service  Assessment/Plan:  The patient was reviewed  Renal function is stable and no signs or symptoms of nephrotoxicity and/or infusion reactions were documented in the chart  Based on todays assessment, continue current vancomycin (day # 3) dosing of 750mg IV q24h, with a plan for trough to be drawn at 2245 on 7-  We will continue to follow the patients culture results and clinical progress daily      Noel Gordon, Pharmacist

## 2022-07-11 NOTE — ASSESSMENT & PLAN NOTE
· New onset diabetes mellitus  · Hemoglobin A1c checked and is 8 8  · Placed on insulin sliding scale here  · Goal for better control of sugars in the setting of acute infection  · Monitor sugars and consider long-acting insulin before bed

## 2022-07-11 NOTE — PLAN OF CARE
Problem: Potential for Falls  Goal: Patient will remain free of falls  Description: INTERVENTIONS:  - Educate patient/family on patient safety including physical limitations  - Instruct patient to call for assistance with activity   - Consult OT/PT to assist with strengthening/mobility   - Keep Call bell within reach  - Keep bed low and locked with side rails adjusted as appropriate  - Keep care items and personal belongings within reach  - Initiate and maintain comfort rounds  - Make Fall Risk Sign visible to staff  - Offer Toileting every 2 Hours, in advance of need  - Initiate/Maintain bed alarm  - Obtain necessary fall risk management equipment: walker  - Apply yellow socks and bracelet for high fall risk patients  - Consider moving patient to room near nurses station  Outcome: Progressing     Problem: Prexisting or High Potential for Compromised Skin Integrity  Goal: Skin integrity is maintained or improved  Description: INTERVENTIONS:  - Identify patients at risk for skin breakdown  - Assess and monitor skin integrity  - Assess and monitor nutrition and hydration status  - Monitor labs   - Assess for incontinence   - Turn and reposition patient  - Assist with mobility/ambulation  - Relieve pressure over bony prominences  - Avoid friction and shearing  - Provide appropriate hygiene as needed including keeping skin clean and dry  - Evaluate need for skin moisturizer/barrier cream  - Collaborate with interdisciplinary team   - Patient/family teaching  - Consider wound care consult   Outcome: Progressing     Problem: MOBILITY - ADULT  Goal: Maintain or return to baseline ADL function  Description: INTERVENTIONS:  -  Assess patient's ability to carry out ADLs; assess patient's baseline for ADL function and identify physical deficits which impact ability to perform ADLs (bathing, care of mouth/teeth, toileting, grooming, dressing, etc )  - Assess/evaluate cause of self-care deficits   - Assess range of motion  - Assess patient's mobility; develop plan if impaired  - Assess patient's need for assistive devices and provide as appropriate  - Encourage maximum independence but intervene and supervise when necessary  - Involve family in performance of ADLs  - Assess for home care needs following discharge   - Consider OT consult to assist with ADL evaluation and planning for discharge  - Provide patient education as appropriate  Outcome: Progressing  Goal: Maintains/Returns to pre admission functional level  Description: INTERVENTIONS:  - Perform BMAT or MOVE assessment daily    - Set and communicate daily mobility goal to care team and patient/family/caregiver  - Collaborate with rehabilitation services on mobility goals if consulted  - Perform Range of Motion 3 times a day  - Reposition patient every 2 hours    - Dangle patient 3 times a day  - Stand patient 3 times a day  - Ambulate patient 3 times a day  - Out of bed to chair 3 times a day   - Out of bed for meals 3 times a day  - Out of bed for toileting  - Record patient progress and toleration of activity level   Outcome: Progressing

## 2022-07-11 NOTE — WOUND OSTOMY CARE
Consult Note - Wound   Brandan Lin 82 y o  male MRN: [id number]  Unit/Bed#: E2 -01 Encounter: [id number]      History and Present Illness:  82year old male presented to the hospital with left lower extremity swelling, pain, and redness  Patient found to have mass with cellulitis, maggots present in wound  Assessment Findings:   Patient agreeable to assessment  He is able to turn in bed with assist x 1  Incontinent of urine at times  Bilateral heels intact, pink, blanchable--elevated off of bed surface  Buttocks and sacrum intact, pink, blanchable  Lower legs dry, scaly  1   Left lower extremity wound--purple mass to posterior leg with surrounding full and partial thickness beefy red wound  Moderate serosanguinous/tan drainage on old dressing when removed  Area painful with care  Hoda-wound erythematous  Wound evaluated by surgery team as well  No surgical intervention at this time--patient to follow-up with dermatology as an outpatient  Biopsy recommended once infection has resolved  See flowsheet for wound details  Wound Care Plan:   1-Hydraguard lotion to right lower leg, bilateral buttocks, sacrum, and heels three times daily and as needed  2-Elevate heels off of bed/chair surface to offload pressure  3-Offloading air cushion in chair when out of bed  4-Moisturize skin daily with skin nourishing cream   5-Turn/reposition every 2 hours while in bed and weight shift frequently while in chair for pressure re-distribution on skin  6-Left leg--cleanse with normal saline, pat dry  Apply adaptic to open areas/mass  Cover with Maxorb and ABDs  Wrap with rolled gauze  Change dressing daily and as needed with soilage  Wound care team to follow  Plan of care reviewed with primary RN  Patient should follow-up at the wound center and/or  dermatology as an outpatient  Wound 07/09/22 Leg Posterior; Left (Active)   Wound Image   07/11/22 1128   Wound Description Beefy red;Bleeding 07/11/22 1128   Hoda-wound Assessment Erythema;Fragile 07/11/22 1128   Wound Length (cm) 11 cm 07/11/22 1128   Wound Width (cm) 16 cm 07/11/22 1128   Wound Depth (cm) 0 1 cm 07/11/22 1128   Wound Surface Area (cm^2) 176 cm^2 07/11/22 1128   Wound Volume (cm^3) 17 6 cm^3 07/11/22 1128   Calculated Wound Volume (cm^3) 17 6 cm^3 07/11/22 1128   Drainage Amount Moderate 07/11/22 1128   Drainage Description Serosanguineous; Nick 07/11/22 1128   Treatments Cleansed;Irrigation with NSS;Elevated 07/11/22 1128   Dressing Non adherent;Calcium Alginate;ABD 07/11/22 1128   Dressing Changed Changed 07/11/22 1128   Patient Tolerance Tolerated well 07/11/22 1128   Dressing Status Clean;Dry; Intact 07/11/22 [address] [place] Silvio BSN, RN, [facility]

## 2022-07-11 NOTE — PROGRESS NOTES
Progress Note - General Surgery   Marimar Spring 82 y o  male MRN: [id number]  Unit/Bed#: E2 -01 Encounter: [id number]    Assessment:  82 y  o  male LLE soft tissue mass and cellulitis     Vitals normal on room air  UOP 500cc recorded but likely under recorded    WBC pending (from 11 60)  Hb pending (from 16 2)  Cr pending (from 1 23)    Plan:  Continue IV antibiotics, zosyn/vanc  Follow up blood cultures, no growth to date  Daily dressing changes  Plan for biopsy of right lower extremity lesion once cellulitis resolves  Rest of care per primary    Subjective/Objective     Subjective: No acute events overnight, cellulitis slowly improving    Objective:    Blood pressure 152/76, pulse 72, temperature 98 3 °F (36 8 °C), temperature source Temporal, resp  rate 16, height 5' 9" (1 753 m), weight 91 5 kg (201 lb 11 5 oz), SpO2 93 %  ,Body mass index is 29 79 kg/m²        Intake/Output Summary (Last 24 hours) at 7/11/2022 0525  Last data filed at 7/10/2022 1500  Gross per 24 hour   Intake --   Output 600 ml   Net -600 ml       Invasive Devices  Report    Peripheral Intravenous Line  Duration           Peripheral IV 07/10/22 Left Wrist <1 day                Physical Exam:  Gen:    NAD  CV:      warm, well-perfused  Lungs: No respiratory distress  Abd:     soft, NT/ND  Ext:      LLE dressing changed, cellulitis improving, see photo below  Neuro: A&Ox3

## 2022-07-11 NOTE — ASSESSMENT & PLAN NOTE
Results from last 7 days   Lab Units 07/11/22  0551 07/10/22  0517 07/09/22  1159   CREATININE mg/dL 1 00 1 23 1 56*   · 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  · Baseline creatinine around 0 9  · Renal ultrasound with normal appearing kidneys and bladder  · Presenting creatinine 1 56, improved  · Monitor renal function

## 2022-07-11 NOTE — PROGRESS NOTES
Vancomycin IV Pharmacy-to-Dose Consultation    Glenn Gutierrez is a 82 y o  male who is currently receiving Vancomycin IV with management by the Pharmacy Consult service  Assessment/Plan:  The patient was reviewed  Renal function is stable and no signs or symptoms of nephrotoxicity and/or infusion reactions were documented in the chart  Based on todays assessment, continue current vancomycin (day # 3) dosing of 750mg IV q24h, with a plan for trough to be drawn at 2245 on 7-  We will continue to follow the patients culture results and clinical progress daily      Jovani Tyson, Pharmacist

## 2022-07-11 NOTE — PROGRESS NOTES
[facility]  Progress Note - Stef Kyle 1935, 82 y o  male MRN: [id number]  Unit/Bed#: E2 -01 Encounter: [id number]  Primary Care Provider: No primary care provider on file  Date and time admitted to hospital: 7/9/2022 11:02 AM    * Cellulitis  Assessment & Plan  · Presented with left leg cellulitis, surrounding wounds, and growth on leg  · Maggots were found in the wound upon presentation to the ED  · Ct lower extremity: "Extensive subcutaneous edema likely representing cellulitis  Although evaluation for abscess is limited in the absence images contrast no obvious discrete collection is identified    Dermal soft tissue lesion seen in association with the posterior medial calf "  · Currently on IV Zosyn and vancomycin-day # 3  · Seen and evaluated by General surgery-mass can be biopsied when infection resolved  · Blood cultures negative thus far    Type 2 diabetes mellitus with hyperglycemia, without long-term current use of insulin ([facility])  Assessment & Plan  · New onset diabetes mellitus  · Hemoglobin A1c checked and is 8 8  · Placed on insulin sliding scale here  · Goal for better control of sugars in the setting of acute infection  · Monitor sugars and consider long-acting insulin before bed    Leg mass  Assessment & Plan  · Seen and evaluated by General surgery  · Appears mass has been there for quite some time  · Per surgery, can biopsy once cellulitis is improved   · Pain control    AKUA (acute kidney injury) ([facility] [address] )  Assessment & Plan  Results from last 7 days   Lab Units 07/11/22  0551 07/10/22  0517 07/09/22  1159   CREATININE mg/dL 1 00 1 23 1 56*   · 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  · Baseline creatinine around 0 9  · Renal ultrasound with normal appearing kidneys and bladder  · Presenting creatinine 1 56, improved  · Monitor renal function          VTE Pharmacologic Prophylaxis:   Pharmacologic:  Heparin    Patient Centered Rounds: I have performed bedside rounds with nursing staff today  Education and Discussions with Family / Patient: Updated daughter Surinder Acosta    Time Spent for Care: 20 minutes  More than 50% of total time spent on counseling and coordination of care as described above  Current Length of Stay: 2 day(s)    Current Patient Status: Inpatient   Certification Statement: The patient will continue to require additional inpatient hospital stay due to IV antibiotics, cellulitis    Discharge Plan / Estimated Discharge Date: TBD    Code Status: Level 1 - Full Code      Subjective:   Patient seen and examined at bedside, denies any chest pain, abdominal pain, nausea vomiting    Objective:     Vitals:   Temp (24hrs), Av 7 °F (36 5 °C), Min:96 6 °F (35 9 °C), Max:98 3 °F (36 8 °C)    Temp:  [96 6 °F (35 9 °C)-98 3 °F (36 8 °C)] 98 2 °F (36 8 °C)  HR:  [72-86] 86  Resp:  [16-18] 18  BP: (152-177)/(76-83) 177/83  SpO2:  [93 %-97 %] 97 %  Body mass index is 29 79 kg/m²  Input and Output Summary (last 24 hours): Intake/Output Summary (Last 24 hours) at 2022 1722  Last data filed at 2022 1550  Gross per 24 hour   Intake --   Output 800 ml   Net -800 ml       Physical Exam:    Constitutional: Patient is oriented to person, place and time, no acute distress  HEENT:  Normocephalic, atraumatic  Cardiovascular: Normal S1S2, RRR, No murmurs/rubs/gallops appreciated  Pulmonary:  Bilateral air entry, No rhonchi/rales/wheezing appreciated  Abdominal: Soft, Bowel sounds present, Non-tender, Non-distended  Extremities:  No cyanosis, clubbing or edema  Neurological: Cranial nerves II-XII grossly intact, sensation intact, otherwise no focal neurological symptoms     Skin:  Left leg with dressing in place    Additional Data:     Labs:    Results from last 7 days   Lab Units 22  0551 07/10/22  0517 22  1159   WBC Thousand/uL 8 56   < > 12 05*   HEMOGLOBIN g/dL 15 0   < > 17 1*   HEMATOCRIT % 45 9   < > 52 5*   PLATELETS Thousands/uL 237   < > 262 NEUTROS PCT %  --   --  71   LYMPHS PCT %  --   --  16   MONOS PCT %  --   --  8   EOS PCT %  --   --  3    < > = values in this interval not displayed  Results from last 7 days   Lab Units 07/11/22  0551 07/10/22  0517 07/09/22  1159   POTASSIUM mmol/L 3 4*   < > 3 7   CHLORIDE mmol/L 105   < > 100   CO2 mmol/L 28   < > 30   BUN mg/dL 10   < > 17   CREATININE mg/dL 1 00   < > 1 56*   CALCIUM mg/dL 8 1*   < > 9 0   ALK PHOS U/L  --   --  79   ALT U/L  --   --  22   AST U/L  --   --  19    < > = values in this interval not displayed  Results from last 7 days   Lab Units 07/09/22  1159   INR  1 04        I Have Reviewed All Lab Data Listed Above  Recent Cultures (last 7 days):     Results from last 7 days   Lab Units 07/09/22  1159   BLOOD CULTURE  No Growth at 24 hrs  No Growth at 24 hrs         Last 24 Hours Medication List:   Current Facility-Administered Medications   Medication Dose Route Frequency Provider Last Rate    acetaminophen  650 mg Oral Q6H PRN Vanessa Ambron, DO      heparin (porcine)  5,000 Units Subcutaneous Q8H [facility] Vanessa Ambron, DO      HYDROmorphone  0 2 mg Intravenous Q4H PRN Vanessa Ambron, DO      insulin lispro  1-5 Units Subcutaneous TID AC Vanessa Ambron, DO      insulin lispro  1-5 Units Subcutaneous HS Vanessa Ambron, DO      ondansetron  4 mg Intravenous Q6H PRN Vanessa Ambron, DO      oxyCODONE  2 5 mg Oral Q8H PRN Vanessa Ambron, DO      piperacillin-tazobactam  3 375 g Intravenous Q8H Vanessa Ambron, DO 3 375 g (07/11/22 1440)    vancomycin  750 mg Intravenous Q24H Vanessa Ambron,  mg (07/10/22 2303)        Today, Patient Was Seen By: Mela Acharya MD

## 2022-07-12 PROBLEM — I10 HYPERTENSION: Status: ACTIVE | Noted: 2022-01-01

## 2022-07-12 LAB
ANION GAP SERPL CALCULATED.3IONS-SCNC: 10 MMOL/L (ref 4–13)
BASOPHILS # BLD AUTO: 0.1 THOUSANDS/ΜL (ref 0–0.1)
BASOPHILS NFR BLD AUTO: 1 % (ref 0–1)
BUN SERPL-MCNC: 8 MG/DL (ref 5–25)
CALCIUM SERPL-MCNC: 7.9 MG/DL (ref 8.3–10.1)
CHLORIDE SERPL-SCNC: 104 MMOL/L (ref 100–108)
CO2 SERPL-SCNC: 26 MMOL/L (ref 21–32)
CREAT SERPL-MCNC: 1.03 MG/DL (ref 0.6–1.3)
EOSINOPHIL # BLD AUTO: 0.41 THOUSAND/ΜL (ref 0–0.61)
EOSINOPHIL NFR BLD AUTO: 5 % (ref 0–6)
ERYTHROCYTE [DISTWIDTH] IN BLOOD BY AUTOMATED COUNT: 12.9 % (ref 11.6–15.1)
GFR SERPL CREATININE-BSD FRML MDRD: 65 ML/MIN/1.73SQ M
GLUCOSE SERPL-MCNC: 131 MG/DL (ref 65–140)
GLUCOSE SERPL-MCNC: 135 MG/DL (ref 65–140)
GLUCOSE SERPL-MCNC: 139 MG/DL (ref 65–140)
GLUCOSE SERPL-MCNC: 147 MG/DL (ref 65–140)
GLUCOSE SERPL-MCNC: 158 MG/DL (ref 65–140)
HCT VFR BLD AUTO: 46.2 % (ref 36.5–49.3)
HGB BLD-MCNC: 15.3 G/DL (ref 12–17)
IMM GRANULOCYTES # BLD AUTO: 0.11 THOUSAND/UL (ref 0–0.2)
IMM GRANULOCYTES NFR BLD AUTO: 1 % (ref 0–2)
LYMPHOCYTES # BLD AUTO: 2.57 THOUSANDS/ΜL (ref 0.6–4.47)
LYMPHOCYTES NFR BLD AUTO: 31 % (ref 14–44)
MCH RBC QN AUTO: 29.9 PG (ref 26.8–34.3)
MCHC RBC AUTO-ENTMCNC: 33.1 G/DL (ref 31.4–37.4)
MCV RBC AUTO: 90 FL (ref 82–98)
MONOCYTES # BLD AUTO: 0.72 THOUSAND/ΜL (ref 0.17–1.22)
MONOCYTES NFR BLD AUTO: 9 % (ref 4–12)
NEUTROPHILS # BLD AUTO: 4.52 THOUSANDS/ΜL (ref 1.85–7.62)
NEUTS SEG NFR BLD AUTO: 53 % (ref 43–75)
NRBC BLD AUTO-RTO: 0 /100 WBCS
PLATELET # BLD AUTO: 256 THOUSANDS/UL (ref 149–390)
PMV BLD AUTO: 10.2 FL (ref 8.9–12.7)
POTASSIUM SERPL-SCNC: 3.3 MMOL/L (ref 3.5–5.3)
RBC # BLD AUTO: 5.11 MILLION/UL (ref 3.88–5.62)
SARS-COV-2 RNA RESP QL NAA+PROBE: NEGATIVE
SODIUM SERPL-SCNC: 140 MMOL/L (ref 136–145)
VANCOMYCIN TROUGH SERPL-MCNC: 7.5 UG/ML (ref 10–20)
WBC # BLD AUTO: 8.43 THOUSAND/UL (ref 4.31–10.16)

## 2022-07-12 PROCEDURE — 80202 ASSAY OF VANCOMYCIN: CPT | Performed by: INTERNAL MEDICINE

## 2022-07-12 PROCEDURE — 99232 SBSQ HOSP IP/OBS MODERATE 35: CPT | Performed by: INTERNAL MEDICINE

## 2022-07-12 PROCEDURE — 97163 PT EVAL HIGH COMPLEX 45 MIN: CPT

## 2022-07-12 PROCEDURE — 80048 BASIC METABOLIC PNL TOTAL CA: CPT | Performed by: INTERNAL MEDICINE

## 2022-07-12 PROCEDURE — 85025 COMPLETE CBC W/AUTO DIFF WBC: CPT | Performed by: INTERNAL MEDICINE

## 2022-07-12 PROCEDURE — 97167 OT EVAL HIGH COMPLEX 60 MIN: CPT

## 2022-07-12 PROCEDURE — 82948 REAGENT STRIP/BLOOD GLUCOSE: CPT

## 2022-07-12 RX ORDER — HYDRALAZINE HYDROCHLORIDE 20 MG/ML
5 INJECTION INTRAMUSCULAR; INTRAVENOUS ONCE
Status: COMPLETED | OUTPATIENT
Start: 2022-07-12 | End: 2022-07-12

## 2022-07-12 RX ORDER — AMLODIPINE BESYLATE 10 MG/1
10 TABLET ORAL DAILY
Status: DISCONTINUED | OUTPATIENT
Start: 2022-07-12 | End: 2022-08-05 | Stop reason: HOSPADM

## 2022-07-12 RX ORDER — POTASSIUM CHLORIDE 20 MEQ/1
40 TABLET, EXTENDED RELEASE ORAL ONCE
Status: COMPLETED | OUTPATIENT
Start: 2022-07-12 | End: 2022-07-12

## 2022-07-12 RX ADMIN — PIPERACILLIN AND TAZOBACTAM 3.38 G: 3; .375 INJECTION, POWDER, LYOPHILIZED, FOR SOLUTION INTRAVENOUS at 22:51

## 2022-07-12 RX ADMIN — VANCOMYCIN HYDROCHLORIDE 750 MG: 750 INJECTION, SOLUTION INTRAVENOUS at 23:46

## 2022-07-12 RX ADMIN — OXYCODONE 2.5 MG: 5 TABLET ORAL at 12:42

## 2022-07-12 RX ADMIN — ACETAMINOPHEN 650 MG: 325 TABLET, FILM COATED ORAL at 09:12

## 2022-07-12 RX ADMIN — INSULIN LISPRO 1 UNITS: 100 INJECTION, SOLUTION INTRAVENOUS; SUBCUTANEOUS at 15:58

## 2022-07-12 RX ADMIN — POTASSIUM CHLORIDE 40 MEQ: 1500 TABLET, EXTENDED RELEASE ORAL at 08:22

## 2022-07-12 RX ADMIN — PIPERACILLIN AND TAZOBACTAM 3.38 G: 3; .375 INJECTION, POWDER, LYOPHILIZED, FOR SOLUTION INTRAVENOUS at 14:23

## 2022-07-12 RX ADMIN — PIPERACILLIN AND TAZOBACTAM 3.38 G: 3; .375 INJECTION, POWDER, LYOPHILIZED, FOR SOLUTION INTRAVENOUS at 06:59

## 2022-07-12 RX ADMIN — AMLODIPINE BESYLATE 10 MG: 10 TABLET ORAL at 08:22

## 2022-07-12 RX ADMIN — HEPARIN SODIUM 5000 UNITS: 5000 INJECTION INTRAVENOUS; SUBCUTANEOUS at 21:23

## 2022-07-12 RX ADMIN — HYDRALAZINE HYDROCHLORIDE 5 MG: 20 INJECTION, SOLUTION INTRAMUSCULAR; INTRAVENOUS at 08:23

## 2022-07-12 RX ADMIN — HEPARIN SODIUM 5000 UNITS: 5000 INJECTION INTRAVENOUS; SUBCUTANEOUS at 13:02

## 2022-07-12 RX ADMIN — HYDROMORPHONE HYDROCHLORIDE 0.2 MG: 0.2 INJECTION, SOLUTION INTRAMUSCULAR; INTRAVENOUS; SUBCUTANEOUS at 20:13

## 2022-07-12 NOTE — ED PROVIDER NOTES
History  Chief Complaint   Patient presents with    Wound Check     Pt c/o left leg swelling and pain with redness  There was a growth that he was supposed to go see a dermatologist for but he refused so they have been doing wound care a home  But this morning he woke up with drainage on the bandage  Area is warm to touch  and foul odor  Patient presents to the ER for evaluation of a left leg  Patient states he has had a growth on his left leg for the past few years however it has significantly worsened  Patient states that he was seen a couple months ago and was given antibiotics which he finished  Patient's daughter states that her and her sister over to change the dressing however recently the patient has not been letting them change the dressing  States that he was supposed to follow-up with dermatology however has not  Patient is unsure of any past medical history  Patient's daughter states that the patient is not taking any medications as when his family doctor , the patient throughout his medications and has not been restarted on them  Patient denies any other complaints at this time  Denies any headaches, dizziness, fevers, chest pain, shortness of breath, numbness, tingling, weakness, or any other concerning symptoms          Prior to Admission Medications   Prescriptions Last Dose Informant Patient Reported? Taking?   carbamide peroxide (DEBROX) 6 5 % otic solution Not Taking at Unknown time  No No   Sig: Administer 5 drops into ears 2 (two) times a day   Patient not taking: Reported on 2022      Facility-Administered Medications: None       Past Medical History:   Diagnosis Date    Hx of heart artery stent        Past Surgical History:   Procedure Laterality Date    BACK SURGERY         History reviewed  No pertinent family history  I have reviewed and agree with the history as documented      E-Cigarette/Vaping     E-Cigarette/Vaping Substances     Social History     Tobacco Use  Smoking status: Former Smoker    Smokeless tobacco: Never Used   Substance Use Topics    Alcohol use: Never    Drug use: Never       Review of Systems   Constitutional: Negative for chills and fever  HENT: Negative for congestion and sore throat  Respiratory: Negative for cough and shortness of breath  Cardiovascular: Negative for chest pain  Gastrointestinal: Negative for abdominal pain, diarrhea, nausea and vomiting  Genitourinary: Negative for dysuria  Musculoskeletal: Negative for back pain  Skin: Positive for wound  Negative for rash  Neurological: Negative for headaches  All other systems reviewed and are negative  Physical Exam  Physical Exam  Constitutional:       General: He is not in acute distress  Appearance: He is well-developed  HENT:      Head: Normocephalic and atraumatic  Nose: Nose normal    Eyes:      Conjunctiva/sclera: Conjunctivae normal    Cardiovascular:      Rate and Rhythm: Normal rate  Pulmonary:      Effort: Pulmonary effort is normal    Abdominal:      Palpations: Abdomen is soft  Tenderness: There is no abdominal tenderness  There is no guarding  Musculoskeletal:         General: Normal range of motion  Cervical back: Normal range of motion  Skin:     General: Skin is warm  Capillary Refill: Capillary refill takes less than 2 seconds  Comments: Growth noted to left lower leg with significant surrounding erythema and skin breakdown  Maggots present  Neurological:      Mental Status: He is alert and oriented to person, place, and time           Vital Signs  ED Triage Vitals   Temperature Pulse Respirations Blood Pressure SpO2   07/09/22 1100 07/09/22 1100 07/09/22 1100 07/09/22 1100 07/09/22 1100   97 9 °F (36 6 °C) 87 18 168/77 97 %      Temp Source Heart Rate Source Patient Position - Orthostatic VS BP Location FiO2 (%)   07/09/22 1100 07/09/22 1319 07/09/22 1100 07/09/22 1100 --   Oral Monitor Sitting Right arm Pain Score       07/09/22 1100       6           Vitals:    07/11/22 0808 07/11/22 1634 07/11/22 1738 07/11/22 2300   BP: 165/79 (!) 177/83 160/84 (!) 171/90   Pulse: 82 86 93 87   Patient Position - Orthostatic VS: Lying Lying Lying Lying         Visual Acuity      ED Medications  Medications   piperacillin-tazobactam (ZOSYN) 3 375 g in sodium chloride 0 9 % 100 mL IVPB (3 375 g Intravenous New Bag 7/11/22 2246)   ondansetron (ZOFRAN) injection 4 mg (has no administration in time range)   acetaminophen (TYLENOL) tablet 650 mg (650 mg Oral Given 7/11/22 1322)   heparin (porcine) subcutaneous injection 5,000 Units (5,000 Units Subcutaneous Given 7/11/22 2158)   insulin lispro (HumaLOG) 100 units/mL subcutaneous injection 1-5 Units (1 Units Subcutaneous Not Given 7/11/22 1549)   insulin lispro (HumaLOG) 100 units/mL subcutaneous injection 1-5 Units (1 Units Subcutaneous Not Given 7/11/22 2155)   oxyCODONE (ROXICODONE) IR tablet 2 5 mg (2 5 mg Oral Given 7/11/22 0306)   HYDROmorphone HCl (DILAUDID) injection 0 2 mg (0 2 mg Intravenous Given 7/10/22 2202)   vancomycin (VANCOCIN) IVPB (premix in dextrose) 750 mg 150 mL (750 mg Intravenous New Bag 7/11/22 2342)   piperacillin-tazobactam (ZOSYN) IVPB 3 375 g (0 g Intravenous Stopped 7/9/22 1507)   sodium chloride 0 9 % bolus 500 mL (0 mL Intravenous Stopped 7/9/22 1507)   morphine injection 2 mg (2 mg Intravenous Given 7/9/22 1500)   vancomycin (VANCOCIN) 1,750 mg in sodium chloride 0 9 % 500 mL IVPB (0 mg Intravenous Stopped 7/10/22 0511)   potassium chloride (K-DUR,KLOR-CON) CR tablet 40 mEq (40 mEq Oral Given 7/11/22 0937)   hydrALAZINE (APRESOLINE) injection 5 mg (5 mg Intravenous Given 7/11/22 1650)       Diagnostic Studies  Results Reviewed     Procedure Component Value Units Date/Time    Blood culture #1 [[id number]] Collected: 07/09/22 1159    Lab Status: Preliminary result Specimen: Blood from Hand, Right Updated: 07/11/22 180     Blood Culture No Growth at 48 hrs     Blood culture #2 [[id number]] Collected: 07/09/22 1159    Lab Status: Preliminary result Specimen: Blood from Arm, Right Updated: 07/11/22 1804     Blood Culture No Growth at 48 hrs  Hemoglobin A1C [[id number]]  (Abnormal) Collected: 07/09/22 1159    Lab Status: Final result Specimen: Blood from Arm, Right Updated: 07/09/22 2139     Hemoglobin A1C 8 8 %       mg/dl     CK Total with Reflex CKMB [[id number]]  (Normal) Collected: 07/09/22 1159    Lab Status: Final result Specimen: Blood from Arm, Right Updated: 07/09/22 1404     Total CK 58 U/L     Lactic acid [[id number]]  (Normal) Collected: 07/09/22 1159    Lab Status: Final result Specimen: Blood from Arm, Right Updated: 07/09/22 1241     LACTIC ACID 1 8 mmol/L     Narrative:      Result may be elevated if tourniquet was used during collection      Comprehensive metabolic panel [[id number]]  (Abnormal) Collected: 07/09/22 1159    Lab Status: Final result Specimen: Blood from Arm, Right Updated: 07/09/22 1239     Sodium 141 mmol/L      Potassium 3 7 mmol/L      Chloride 100 mmol/L      CO2 30 mmol/L      ANION GAP 11 mmol/L      BUN 17 mg/dL      Creatinine 1 56 mg/dL      Glucose 214 mg/dL      Calcium 9 0 mg/dL      AST 19 U/L      ALT 22 U/L      Alkaline Phosphatase 79 U/L      Total Protein 8 7 g/dL      Albumin 3 5 g/dL      Total Bilirubin 0 96 mg/dL      eGFR 39 ml/min/1 73sq m     Narrative:      [place] guidelines for Chronic Kidney Disease (CKD):     Stage 1 with normal or high GFR (GFR > 90 mL/min/1 73 square meters)    Stage 2 Mild CKD (GFR = 60-89 mL/min/1 73 square meters)    Stage 3A Moderate CKD (GFR = 45-59 mL/min/1 73 square meters)    Stage 3B Moderate CKD (GFR = 30-44 mL/min/1 73 square meters)    Stage 4 Severe CKD (GFR = 15-29 mL/min/1 73 square meters)    Stage 5 End Stage CKD (GFR <15 mL/min/1 73 square meters)  Note: GFR calculation is accurate only with a steady state creatinine    Protime-INR [[id number]]  (Normal) Collected: 07/09/22 1159    Lab Status: Final result Specimen: Blood from Arm, Right Updated: 07/09/22 1229     Protime 13 3 seconds      INR 1 04    APTT [[id number]]  (Normal) Collected: 07/09/22 1159    Lab Status: Final result Specimen: Blood from Arm, Right Updated: 07/09/22 1229     PTT 33 seconds     CBC and differential [[id number]]  (Abnormal) Collected: 07/09/22 1159    Lab Status: Final result Specimen: Blood from Arm, Right Updated: 07/09/22 1216     WBC 12 05 Thousand/uL      RBC 5 76 Million/uL      Hemoglobin 17 1 g/dL      Hematocrit 52 5 %      MCV 91 fL      MCH 29 7 pg      MCHC 32 6 g/dL      RDW 13 4 %      MPV 10 9 fL      Platelets 498 Thousands/uL      nRBC 0 /100 WBCs      Neutrophils Relative 71 %      Immat GRANS % 1 %      Lymphocytes Relative 16 %      Monocytes Relative 8 %      Eosinophils Relative 3 %      Basophils Relative 1 %      Neutrophils Absolute 8 68 Thousands/µL      Immature Grans Absolute 0 12 Thousand/uL      Lymphocytes Absolute 1 93 Thousands/µL      Monocytes Absolute 0 94 Thousand/µL      Eosinophils Absolute 0 30 Thousand/µL      Basophils Absolute 0 08 Thousands/µL                  US kidney and bladder   Final Result by Mukund Irby MD (07/11 2813)         1  Normal-appearing kidneys and bladder  2   Hepatic steatosis  Workstation performed: [id number]         CT lower extremity wo contrast right   Final Result by Mallory Boast, MD (07/09 1534)      Extensive subcutaneous edema likely representing cellulitis  Although evaluation for abscess is limited in the absence images contrast no obvious discrete collection is identified  Dermal soft tissue lesion seen in association with the posterior medial    calf  Workstation performed: [id number]         XR tibia fibula 2 views LEFT   Final Result by Babak Childs MD (07/10 8266)      No acute osseous abnormality     Focal skin-based lesion in the posterior medial proximal leg, correlate with the mass      Workstation performed: [id number]                    Procedures  Procedures         ED Course  ED Course as of 07/12/22 0053   Sat Jul 09, 2022   1108 Blood Pressure: 168/77   1109 Temperature: 97 9 °F (36 6 °C)   1109 Pulse: 87   1109 Respirations: 18   1109 SpO2: 97 %   1250 Creatinine(!): 1 56  0 9 3 months ago   1250 Glucose, Random(!): 214  Suspect likely diabetic   1315 Surgery paged   1357 Per surgery who saw patient at bedside, "recommend medicine admit  Iv antibiotics for cellulitis  Ct without contrast left lower leg  Will need to biopsy this admission but not until cellulitis properly treated"   1425 Total CK: 58   1604 CT lower extremity wo contrast right  IMPRESSION:     Extensive subcutaneous edema likely representing cellulitis  Although evaluation for abscess is limited in the absence images contrast no obvious discrete collection is identified  Dermal soft tissue lesion seen in association with the posterior medial   calf  [facility] Marisleo marrer texted for admission                               SBIRT 20yo+    Flowsheet Row Most Recent Value   SBIRT (23 yo +)    In order to provide better care to our patients, we are screening all of our patients for alcohol and drug use  Would it be okay to ask you these screening questions? 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  Surgery consult that and saw patient in the ED  Patient admitted to Medicine  Patient stable throughout ER stay      Disposition  Final diagnoses:   Wound of left lower extremity, initial encounter   Cellulitis   Blood glucose elevated     Time reflects when diagnosis was documented in both MDM as applicable and the Disposition within this note     Time User Action Codes Description Comment    7/9/2022  4:12 PM Samantha Gomez Add [D78 726O] Wound of left lower extremity, initial encounter     7/9/2022  4:12 PM Jessica Peres Add [L03 [id number]] Cellulitis     7/9/2022  4:31 PM Jessica Peres Add [R73 9] Blood glucose elevated       ED Disposition     ED Disposition   Admit    Condition   Stable    Date/Time   Sat Jul 9, 2022 1631    Comment   Case was discussed with VANDANA and the patient's admission status was agreed to be Admission Status: inpatient status to the service of Dr Yani Cantu   Follow-up Information    None         Current Discharge Medication List      CONTINUE these medications which have NOT CHANGED    Details   carbamide peroxide (DEBROX) 6 5 % otic solution Administer 5 drops into ears 2 (two) times a day  Qty: 15 mL, Refills: 0    Associated Diagnoses: Cerumen impaction             No discharge procedures on file      PDMP Review     None          ED Provider  Electronically Signed by           Jean Pierre Ahn PA-C  07/12/22 0101       Jean Pierre Ahn PA-C  07/12/22 0101

## 2022-07-12 NOTE — PLAN OF CARE
Problem: Potential for Falls  Goal: Patient will remain free of falls  Description: INTERVENTIONS:  - Educate patient/family on patient safety including physical limitations  - Instruct patient to call for assistance with activity   - Consult OT/PT to assist with strengthening/mobility   - Keep Call bell within reach  - Keep bed low and locked with side rails adjusted as appropriate  - Keep care items and personal belongings within reach  - Initiate and maintain comfort rounds  - Make Fall Risk Sign visible to staff  - Offer Toileting every 2 Hours, in advance of need  - Initiate/Maintain bed alarm  - Obtain necessary fall risk management equipment:  cane  - Apply yellow socks and bracelet for high fall risk patients  - Consider moving patient to room near nurses station  Outcome: Progressing     Problem: Prexisting or High Potential for Compromised Skin Integrity  Goal: Skin integrity is maintained or improved  Description: INTERVENTIONS:  - Identify patients at risk for skin breakdown  - Assess and monitor skin integrity  - Assess and monitor nutrition and hydration status  - Monitor labs   - Assess for incontinence   - Turn and reposition patient  - Assist with mobility/ambulation  - Relieve pressure over bony prominences  - Avoid friction and shearing  - Provide appropriate hygiene as needed including keeping skin clean and dry  - Evaluate need for skin moisturizer/barrier cream  - Collaborate with interdisciplinary team   - Patient/family teaching  - Consider wound care consult   Outcome: Progressing     Problem: MOBILITY - ADULT  Goal: Maintain or return to baseline ADL function  Description: INTERVENTIONS:  -  Assess patient's ability to carry out ADLs; assess patient's baseline for ADL function and identify physical deficits which impact ability to perform ADLs (bathing, care of mouth/teeth, toileting, grooming, dressing, etc )  - Assess/evaluate cause of self-care deficits   - Assess range of motion  - Assess patient's mobility; develop plan if impaired  - Assess patient's need for assistive devices and provide as appropriate  - Encourage maximum independence but intervene and supervise when necessary  - Involve family in performance of ADLs  - Assess for home care needs following discharge   - Consider OT consult to assist with ADL evaluation and planning for discharge  - Provide patient education as appropriate  Outcome: Progressing

## 2022-07-12 NOTE — CASE MANAGEMENT
Case Management Assessment & Discharge Planning Note    Patient name Ayana Vale  Location [facility] 2 /E2 MS 5-* MRN [id number]  : 1935 Date 2022       Current Admission Date: 2022  Current Admission Diagnosis:Cellulitis   Patient Active Problem List    Diagnosis Date Noted    Hypertension 2022    [facility] 20[facility]    Cellulitis 2022    Leg mass 2022    Type 2 diabetes mellitus with hyperglycemia, without long-term current use of insulin ([facility] ) 2022      LOS (days): 3  Geometric Mean LOS (GMLOS) (days): 3 10  Days to GMLOS:0 2     OBJECTIVE:    Risk of Unplanned Readmission Score: 9         Current admission status: Inpatient       Preferred Pharmacy:   [address] [place] [id number]  Phone: [phone] Fax: [phone]    Primary Care Provider: No primary care provider on file  Primary Insurance: MEDICARE  Secondary Insurance: Ingrid DUNCAN    ASSESSMENT:  [facility], [address]ña Representative - Daughter   Primary Phone: [phone] (Mobile)               Advance Directives  Does patient have a [address]?: Yes  Does patient have Advance Directives?: Yes  Advance Directives: Living will, Power of  for health care  Primary Contact: Monica Harrison (dtr) [phone]         Readmission Root Cause  30 Day Readmission: No    Patient Information  Admitted from[de-identified] Home  Mental Status: Alert  During Assessment patient was accompanied by: Not accompanied during assessment  Assessment information provided by[de-identified] Patient  Primary Caregiver: Self  Support Systems: Daughter, Family members  [place] of Residence: [address] do you live in?: [address] entry access options   Select all that apply : Ramp  Type of Current Residence: Apartment  Floor Level: 2  Upon entering residence, is there a bedroom on the main floor (no further steps)?: Yes  Upon entering residence, is there a bathroom on the main floor (no further steps)?: Yes  In the last 12 months, was there a time when you were not able to pay the mortgage or rent on time?: No  In the last 12 months, how many places have you lived?: 1  In the last 12 months, was there a time when you did not have a steady place to sleep or slept in a shelter (including now)?: No  Homeless/housing insecurity resource given?: N/A  Living Arrangements: Lives Alone  Is patient a ?: No    Activities of Daily Living Prior to Admission  Functional Status: Independent  Completes ADLs independently?: Yes  Ambulates independently?: Yes  Does patient use assisted devices?: No  Does patient currently own DME?: Yes  What DME does the patient currently own?: [employer]asmine [employer]  Does patient have a history of Outpatient Therapy (PT/OT)?: No  Does the patient have a history of Short-Term Rehab?: No  Does patient have a history of HHC?: No  Does patient currently have [facility]?: No         Patient Information Continued  Income Source: Pension/shelter  Does patient have prescription coverage?: Yes  Within the past 12 months, you worried that your food would run out before you got the money to buy more : Never true  Within the past 12 months, the food you bought just didn't last and you didn't have money to get more : Never true  Food insecurity resource given?: N/A  Does patient receive dialysis treatments?: No  Does patient have a history of substance abuse?: No  Does patient have a history of Mental Health Diagnosis?: No         Means of Transportation  [facility]] Family transport  In the past 12 months, has lack of transportation kept you from medical appointments or from getting medications?: No  In the past 12 months, has lack of transportation kept you from meetings, work, or from getting things needed for daily living?: No  Was application for public transport provided?: N/A        DISCHARGE DETAILS:    Discharge planning discussed with[de-identified] Patient        CM contacted family/caregiver?: No- see comments (pt declined CM wanting to reach out to dtr)  Were Treatment Team discharge recommendations reviewed with patient/caregiver?: Yes  Did patient/caregiver verbalize understanding of patient care needs?: Yes  Were patient/caregiver advised of the risks associated with not following Treatment Team discharge recommendations?: Yes                             Treatment Team Recommendation: Home with [address] [facility]                                            Additional Comments: CM met w/ pt at bedside to complete assessment  Pt lives in a 2nd floor apartment alone w/ a ramp to enter  Pt states he complets all his ADL's independently w/ no use of assisted devices  Pt does own a walker  Pt dtr is in area & able to drive pt to wherever he needs to go  Dtr is his [address]  Pt does have a LW & AD on file w/ us  Pt does not have any hx of HH or IP STR  Pt does not have any questions or concerns at this time & will update dtr accordingly  CM to continue to follow pt care & d/c

## 2022-07-12 NOTE — ASSESSMENT & PLAN NOTE
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

## 2022-07-12 NOTE — OCCUPATIONAL THERAPY NOTE
Occupational Therapy Evaluation(time=0825-0855)     Patient Name: London DIOPQX'R Date: 7/12/2022  Problem List  Principal Problem:    Cellulitis  Active Problems:    AKUA (acute kidney injury) ([facility] [address] )    Leg mass    Type 2 diabetes mellitus with hyperglycemia, without long-term current use of insulin ([facility] )    Hypertension    Past Medical History  Past Medical History:   Diagnosis Date    Hx of heart artery stent      Past Surgical History  Past Surgical History:   Procedure Laterality Date    BACK SURGERY             07/12/22 0825   Note Type   Note type Evaluation   Restrictions/Precautions   Weight Bearing Precautions Per Order No   Other Precautions Cognitive; Chair Alarm; Bed Alarm; Fall Risk;Pain   Pain Assessment   Pain Assessment Tool FLACC   Pain Score 10 - Worst Possible Pain   Pain Location/Orientation Location: Back   Pain Rating: FLACC (Rest) - Face 0   Pain Rating: FLACC (Rest) - Legs 0   Pain Rating: FLACC (Rest) - Activity 0   Pain Rating: FLACC (Rest) - Cry 1   Pain Rating: FLACC (Rest) - Consolability 0   Score: FLACC (Rest) 1   Home Living   Type of Home Apartment   Home Layout One level   Bathroom Shower/Tub Tub/shower unit   Bathroom Toilet Standard   Bathroom Equipment Grab bars in [facility]   Prior Function   Lives With [address] [place] [address] in the last 6 months 0   Lifestyle   Autonomy PTA pt states independence with his ADLs, transfers, ambulation--w/o device; neg falls, neg    Reciprocal Relationships 2 dtrs   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   Intrinsic Gratification wordsearches   Psychosocial   Psychosocial (WDL) X   Patient Behaviors/Mood Flat affect; Cooperative   Subjective   Subjective "My back is sore "   ADL   Where Assessed Edge of bed   Eating Assistance 6  Modified independent   Grooming Assistance 6  Modified Independent   UB Bathing Assistance 5  Supervision/Setup   LB Bathing Assistance 5  Supervision/Setup   UB Dressing Assistance 5  Supervision/Setup   LB Dressing Assistance 5  Supervision/Setup   Toileting Assistance  5  Supervision/Setup   Bed Mobility   Rolling R 5  Supervision   Rolling L 5  Supervision   Supine to Sit 5  Supervision   Additional items Increased time required;LE management   Sit to Supine 5  Supervision   Additional items Increased time required;Verbal cues   Transfers   Sit to Stand 5  Supervision   Additional items Increased time required;Verbal cues   Stand to Sit 5  Supervision   Additional items Increased time required;Verbal cues   Functional Mobility   Functional Mobility 5  Supervision   Additional items   (w/o device)   Balance   Static Sitting Good   Dynamic Sitting Fair +   Static Standing Fair   Dynamic Standing Fair -   Activity Tolerance   Activity Tolerance Patient limited by fatigue;Patient limited by pain   Medical Staff Made Aware nsg, P T , CM   RUE Assessment   RUE Assessment WFL   RUE Strength   RUE Overall Strength Within Functional Limits - able to perform ADL tasks with strength  (4/5 throughout)   LUE Assessment   LUE Assessment WFL   LUE Strength   LUE Overall Strength Within Functional Limits - able to perform ADL tasks with strength  (4/5 throughout)   Hand Function   Gross Motor Coordination Functional   Fine Motor Coordination Functional   Sensation   Light Touch No apparent deficits   Proprioception   Proprioception No apparent deficits   Vision-Basic Assessment   Current Vision Wears glasses only for reading   Vision - Complex Assessment   Acuity Able to read clock/calendar on wall without difficulty   Perception   Inattention/Neglect Appears intact   Cognition   Overall Cognitive Status Impaired   Arousal/Participation Alert   Attention Within functional limits   Orientation Level Oriented X4   Memory Within functional limits   Following Commands Follows one step commands without difficulty   Assessment   Limitation Decreased ADL status; Decreased UE strength;Decreased Safe judgement during ADL;Decreased cognition;Decreased endurance;Decreased high-level ADLs   Prognosis Fair   Assessment Pt is a 88y/o male admitted to the hospital 2* 2* L LE swelling, pain, and redness  Pt noted with maggots in his L LEwound, cellulitis, leg mass, and AKUA  Pt with PMH cardiac stent and back sx  PTA pt states independence with his ADLs, transfers, ambulation--w/o device; neg falls, neg   During initial eval, pt demonstrated slight deficits with his functional balance, functional mobility, ADL status, transfer safety, and activity tolerance(currently fair=15-20mins)  Pt demonstrating more significant deficits with his cognition(i e judgement/safety, problem-solving)  Pt would benefit from continued OT tx for the above deficits  2-3xwk/1-2wks  Goals   Patient Goals "to get better"   STG Time Frame   (1-7 days)   Short Term Goal #1 Pt will tolerate continued cognitive/home-safety assessment and appropriate d/c recommendations will be provided  Short Term Goal #2 Pt will demonstrate improved activity tolerance to good(20-30mins) and standing tolerance to 3-5mins to assist with ADLs  Short Term Goal  Pt will demonstrate mod I with their UE and LE bathing/dresssing  LTG Time Frame   (7-14 days)   Long Term Goal #1 Pt will demonstrate g/g- balance with all functional activities  Long Term Goal #2 Pt will demonstrate proper walker/transfer safety 100% of the time  Long Term Goal Pt will demonstrate improved b/l UE strength by 1/2 MM grade to assist with ADLs/transfers  Plan   Treatment Interventions ADL retraining;Functional transfer training;UE strengthening/ROM; Endurance training;Cognitive reorientation;Patient/family training;Equipment evaluation/education; Compensatory technique education;Continued evaluation   Goal Expiration Date 07/26/22   OT Treatment Day 0   OT Frequency 2-3x/wk   Recommendation   OT Discharge Recommendation   (continue PT/OT, supervised environment)   AM-PAC Daily Activity Inpatient   Lower Body Dressing 3   Bathing 3   Toileting 3   Upper Body Dressing 3   Grooming 4   Eating 4   Daily Activity Raw Score 20   Daily Activity Standardized Score (Calc for Raw Score >=11) 42 03   AM-PAC Applied Cognition Inpatient   Following a Speech/Presentation 3   Understanding Ordinary Conversation 3   Taking Medications 3   Remembering Where Things Are Placed or Put Away 3   Remembering List of 4-5 Errands 2   Taking Care of Complicated Tasks 2   Applied Cognition Raw Score 16   Applied Cognition Standardized Score 35 03   Yeny Hawkins, OT

## 2022-07-12 NOTE — PROGRESS NOTES
[address] [facility]  Progress Note - Kaity Espinosa 1935, 82 y o  male MRN: [id number]  Unit/Bed#: E2 -02 Encounter: [id number]  Primary Care Provider: No primary care provider on file  Date and time admitted to hospital: 7/9/2022 11:02 AM    * Cellulitis  Assessment & Plan  · Presented with left leg cellulitis, surrounding wounds, and growth on leg  · Maggots were found in the wound upon presentation to the ED  · Ct lower extremity: "Extensive subcutaneous edema likely representing cellulitis  Although evaluation for abscess is limited in the absence images contrast no obvious discrete collection is identified  Dermal soft tissue lesion seen in association with the posterior medial calf "  · Currently on IV Zosyn and vancomycin-day # 3  · Seen and evaluated by General surgery-mass can be biopsied when infection resolved  · Blood cultures negative thus far    Hypertension  Assessment & Plan        Type 2 diabetes mellitus with hyperglycemia, without long-term current use of insulin (HCC)  Assessment & Plan  · New onset diabetes mellitus  · Hemoglobin A1c checked and is 8 8  · Placed on insulin sliding scale here  · Goal for better control of sugars in the setting of acute infection  · Monitor sugars and consider long-acting insulin before bed    Leg mass  Assessment & Plan  · Seen and evaluated by General surgery  · Appears mass has been there for quite some time  · Surgery follow-up appreciated planning wedge biopsy tomorrow  · Pain control    AKUA (acute kidney injury) [facility])  Assessment & Plan  Results from last 7 days   Lab Units 07/12/22  0613 07/11/22  0551 07/10/22  0517 07/09/22  1159   CREATININE mg/dL 1 03 1 00 1 23 1 56*   · 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  · Baseline creatinine around 0 9  · Renal ultrasound with normal appearing kidneys and bladder  · Presenting creatinine 1 56, improved  · Monitor renal function        VTE Pharmacologic Prophylaxis:   Pharmacologic: Heparin    Patient Centered Rounds: I have performed bedside rounds with nursing staff today  Education and Discussions with Family / Patient: LM for daughter Marshall Lacey    Time Spent for Care: 20 minutes  More than 50% of total time spent on counseling and coordination of care as described above  Current Length of Stay: 3 day(s)    Current Patient Status: Inpatient   Certification Statement: The patient will continue to require additional inpatient hospital stay due to Cellulitis, IV antibiotics, biopsy    Discharge Plan / Estimated Discharge Date: TBD    Code Status: Level 1 - Full Code      Subjective:   Patient seen and examined at bedside, complains of some pain in his left leg, denies any nausea, vomiting    Objective:     Vitals:   Temp (24hrs), Av 4 °F (36 9 °C), Min:98 °F (36 7 °C), Max:98 7 °F (37 1 °C)    Temp:  [98 °F (36 7 °C)-98 7 °F (37 1 °C)] 98 °F (36 7 °C)  HR:  [80-93] 80  Resp:  [18] 18  BP: (122-206)/(59-95) 122/59  SpO2:  [95 %-98 %] 95 %  Body mass index is 29 79 kg/m²  Input and Output Summary (last 24 hours): Intake/Output Summary (Last 24 hours) at 2022 1702  Last data filed at 2022 1544  Gross per 24 hour   Intake --   Output 1250 ml   Net -1250 ml       Physical Exam:    Constitutional: Patient is oriented to person, place and time, no acute distress  HEENT:  Normocephalic, atraumatic  Cardiovascular: Normal S1S2, RRR, No murmurs/rubs/gallops appreciated  Pulmonary:  Bilateral air entry, No rhonchi/rales/wheezing appreciated  Abdominal: Soft, Bowel sounds present, Non-tender, Non-distended  Extremities:  No cyanosis, clubbing or edema  Neurological: Cranial nerves II-XII grossly intact, sensation intact, otherwise no focal neurological symptoms     Skin:  Left lower extremity with dressing in place    Additional Data:     Labs:    Results from last 7 days   Lab Units 22  0531   WBC Thousand/uL 8 43   HEMOGLOBIN g/dL 15 3   HEMATOCRIT % 46 2   PLATELETS Thousands/uL 256   NEUTROS PCT % 53   LYMPHS PCT % 31   MONOS PCT % 9   EOS PCT % 5     Results from last 7 days   Lab Units 07/12/22  0613 07/10/22  0517 07/09/22  1159   POTASSIUM mmol/L 3 3*   < > 3 7   CHLORIDE mmol/L 104   < > 100   CO2 mmol/L 26   < > 30   BUN mg/dL 8   < > 17   CREATININE mg/dL 1 03   < > 1 56*   CALCIUM mg/dL 7 9*   < > 9 0   ALK PHOS U/L  --   --  79   ALT U/L  --   --  22   AST U/L  --   --  19    < > = values in this interval not displayed  Results from last 7 days   Lab Units 07/09/22  1159   INR  1 04        I Have Reviewed All Lab Data Listed Above  Recent Cultures (last 7 days):     Results from last 7 days   Lab Units 07/09/22  1159   BLOOD CULTURE  No Growth at 48 hrs  No Growth at 48 hrs         Last 24 Hours Medication List:   Current Facility-Administered Medications   Medication Dose Route Frequency Provider Last Rate    acetaminophen  650 mg Oral Q6H PRN Vanessa Ambron, DO      amLODIPine  10 mg Oral Daily Ruby Jesus MD      heparin (porcine)  5,000 Units Subcutaneous [facility] Vanessa Ambron, DO      HYDROmorphone  0 2 mg Intravenous Q4H PRN Vanessa Ambron, DO      insulin lispro  1-5 Units Subcutaneous TID AC Vanessa Ambron, DO      insulin lispro  1-5 Units Subcutaneous HS Vanessa Ambron, DO      ondansetron  4 mg Intravenous Q6H PRN Vanessa Ambron, DO      oxyCODONE  2 5 mg Oral Q8H PRN Vanessa Ambron, DO      piperacillin-tazobactam  3 375 g Intravenous Q8H Vanessa Ambron, DO 3 375 g (07/12/22 1423)    vancomycin  750 mg Intravenous Q24H Vanessa Ambron,  mg (07/11/22 2922)        Today, Patient Was Seen By: Ruby Jesus MD

## 2022-07-12 NOTE — PLAN OF CARE
Problem: OCCUPATIONAL THERAPY ADULT  Goal: Performs self-care activities at highest level of function for planned discharge setting  See evaluation for individualized goals  Description: Treatment Interventions: ADL retraining, Functional transfer training, UE strengthening/ROM, Endurance training, Cognitive reorientation, Patient/family training, Equipment evaluation/education, Compensatory technique education, Continued evaluation          See flowsheet documentation for full assessment, interventions and recommendations  Note: Limitation: Decreased ADL status, Decreased UE strength, Decreased Safe judgement during ADL, Decreased cognition, Decreased endurance, Decreased high-level ADLs  Prognosis: Fair  Assessment: Pt is a 88y/o male admitted to the hospital 2* 2* L LE swelling, pain, and redness  Pt noted with maggots in his L LEwound, cellulitis, leg mass, and AKUA  Pt with PMH cardiac stent and back sx  PTA pt states independence with his ADLs, transfers, ambulation--w/o device; neg falls, neg   During initial eval, pt demonstrated slight deficits with his functional balance, functional mobility, ADL status, transfer safety, and activity tolerance(currently fair=15-20mins)  Pt demonstrating more significant deficits with his cognition(i e judgement/safety, problem-solving)  Pt would benefit from continued OT tx for the above deficits  2-3xwk/1-2wks       OT Discharge Recommendation:  (continue PT/OT, supervised environment)

## 2022-07-12 NOTE — ASSESSMENT & PLAN NOTE
· Seen and evaluated by General surgery  · Appears mass has been there for quite some time  · Surgery follow-up appreciated planning wedge biopsy tomorrow  · Pain control

## 2022-07-12 NOTE — PROGRESS NOTES
Vancomycin IV Pharmacy-to-Dose Consultation    Mayur Luciano is a 82 y o  male who is currently receiving Vancomycin IV with management by the Pharmacy Consult service  Assessment/Plan:  The patient was reviewed  Renal function is stable and no signs or symptoms of nephrotoxicity and/or infusion reactions were documented in the chart  Based on todays assessment, continue current vancomycin (day # 4) dosing of 750mg q 24 hours, with a plan for trough to be drawn at 2245 on 7/12  We will continue to follow the patients culture results and clinical progress daily      Marcela Leong, Pharmacist

## 2022-07-13 ENCOUNTER — TELEPHONE (OUTPATIENT)
Dept: SURGERY | Facility: CLINIC | Age: 87
End: 2022-07-13

## 2022-07-13 LAB
ANION GAP SERPL CALCULATED.3IONS-SCNC: 13 MMOL/L (ref 4–13)
BUN SERPL-MCNC: 8 MG/DL (ref 5–25)
CALCIUM SERPL-MCNC: 8.4 MG/DL (ref 8.3–10.1)
CHLORIDE SERPL-SCNC: 103 MMOL/L (ref 100–108)
CO2 SERPL-SCNC: 24 MMOL/L (ref 21–32)
CREAT SERPL-MCNC: 1.03 MG/DL (ref 0.6–1.3)
GFR SERPL CREATININE-BSD FRML MDRD: 65 ML/MIN/1.73SQ M
GLUCOSE SERPL-MCNC: 101 MG/DL (ref 65–140)
GLUCOSE SERPL-MCNC: 126 MG/DL (ref 65–140)
GLUCOSE SERPL-MCNC: 151 MG/DL (ref 65–140)
GLUCOSE SERPL-MCNC: 161 MG/DL (ref 65–140)
GLUCOSE SERPL-MCNC: 170 MG/DL (ref 65–140)
MAGNESIUM SERPL-MCNC: 2.2 MG/DL (ref 1.6–2.6)
POTASSIUM SERPL-SCNC: 3.4 MMOL/L (ref 3.5–5.3)
SODIUM SERPL-SCNC: 140 MMOL/L (ref 136–145)

## 2022-07-13 PROCEDURE — 97530 THERAPEUTIC ACTIVITIES: CPT

## 2022-07-13 PROCEDURE — 80048 BASIC METABOLIC PNL TOTAL CA: CPT | Performed by: INTERNAL MEDICINE

## 2022-07-13 PROCEDURE — 97116 GAIT TRAINING THERAPY: CPT

## 2022-07-13 PROCEDURE — 0HBLXZX EXCISION OF LEFT LOWER LEG SKIN, EXTERNAL APPROACH, DIAGNOSTIC: ICD-10-PCS | Performed by: SURGERY

## 2022-07-13 PROCEDURE — 88341 IMHCHEM/IMCYTCHM EA ADD ANTB: CPT | Performed by: PATHOLOGY

## 2022-07-13 PROCEDURE — 88307 TISSUE EXAM BY PATHOLOGIST: CPT | Performed by: PATHOLOGY

## 2022-07-13 PROCEDURE — 88342 IMHCHEM/IMCYTCHM 1ST ANTB: CPT | Performed by: PATHOLOGY

## 2022-07-13 PROCEDURE — 97110 THERAPEUTIC EXERCISES: CPT

## 2022-07-13 PROCEDURE — NC001 PR NO CHARGE: Performed by: SURGERY

## 2022-07-13 PROCEDURE — 83735 ASSAY OF MAGNESIUM: CPT | Performed by: INTERNAL MEDICINE

## 2022-07-13 PROCEDURE — 82948 REAGENT STRIP/BLOOD GLUCOSE: CPT

## 2022-07-13 PROCEDURE — 99232 SBSQ HOSP IP/OBS MODERATE 35: CPT | Performed by: INTERNAL MEDICINE

## 2022-07-13 RX ORDER — LIDOCAINE HYDROCHLORIDE AND EPINEPHRINE 10; 10 MG/ML; UG/ML
INJECTION, SOLUTION INFILTRATION; PERINEURAL
Status: COMPLETED
Start: 2022-07-13 | End: 2022-07-13

## 2022-07-13 RX ORDER — VANCOMYCIN HYDROCHLORIDE 1 G/200ML
1000 INJECTION, SOLUTION INTRAVENOUS EVERY 24 HOURS
Status: DISCONTINUED | OUTPATIENT
Start: 2022-07-13 | End: 2022-07-14

## 2022-07-13 RX ORDER — POTASSIUM CHLORIDE 20 MEQ/1
40 TABLET, EXTENDED RELEASE ORAL ONCE
Status: COMPLETED | OUTPATIENT
Start: 2022-07-13 | End: 2022-07-13

## 2022-07-13 RX ADMIN — INSULIN LISPRO 1 UNITS: 100 INJECTION, SOLUTION INTRAVENOUS; SUBCUTANEOUS at 22:30

## 2022-07-13 RX ADMIN — HYDROMORPHONE HYDROCHLORIDE 0.2 MG: 0.2 INJECTION, SOLUTION INTRAMUSCULAR; INTRAVENOUS; SUBCUTANEOUS at 23:23

## 2022-07-13 RX ADMIN — PIPERACILLIN AND TAZOBACTAM 3.38 G: 3; .375 INJECTION, POWDER, LYOPHILIZED, FOR SOLUTION INTRAVENOUS at 16:50

## 2022-07-13 RX ADMIN — PIPERACILLIN AND TAZOBACTAM 3.38 G: 3; .375 INJECTION, POWDER, LYOPHILIZED, FOR SOLUTION INTRAVENOUS at 22:30

## 2022-07-13 RX ADMIN — HYDROMORPHONE HYDROCHLORIDE 0.2 MG: 0.2 INJECTION, SOLUTION INTRAMUSCULAR; INTRAVENOUS; SUBCUTANEOUS at 09:45

## 2022-07-13 RX ADMIN — LIDOCAINE HYDROCHLORIDE,EPINEPHRINE BITARTRATE: 10; .01 INJECTION, SOLUTION INFILTRATION; PERINEURAL at 10:12

## 2022-07-13 RX ADMIN — AMLODIPINE BESYLATE 10 MG: 10 TABLET ORAL at 09:30

## 2022-07-13 RX ADMIN — HEPARIN SODIUM 5000 UNITS: 5000 INJECTION INTRAVENOUS; SUBCUTANEOUS at 22:29

## 2022-07-13 RX ADMIN — POTASSIUM CHLORIDE 40 MEQ: 1500 TABLET, EXTENDED RELEASE ORAL at 09:30

## 2022-07-13 RX ADMIN — Medication 20 ML: at 10:13

## 2022-07-13 RX ADMIN — OXYCODONE 2.5 MG: 5 TABLET ORAL at 20:32

## 2022-07-13 RX ADMIN — INSULIN LISPRO 1 UNITS: 100 INJECTION, SOLUTION INTRAVENOUS; SUBCUTANEOUS at 16:46

## 2022-07-13 RX ADMIN — INSULIN LISPRO 1 UNITS: 100 INJECTION, SOLUTION INTRAVENOUS; SUBCUTANEOUS at 12:13

## 2022-07-13 RX ADMIN — PIPERACILLIN AND TAZOBACTAM 3.38 G: 3; .375 INJECTION, POWDER, LYOPHILIZED, FOR SOLUTION INTRAVENOUS at 06:41

## 2022-07-13 RX ADMIN — VANCOMYCIN HYDROCHLORIDE 1000 MG: 1 INJECTION, SOLUTION INTRAVENOUS at 23:19

## 2022-07-13 RX ADMIN — OXYCODONE 2.5 MG: 5 TABLET ORAL at 03:43

## 2022-07-13 RX ADMIN — HEPARIN SODIUM 5000 UNITS: 5000 INJECTION INTRAVENOUS; SUBCUTANEOUS at 06:41

## 2022-07-13 RX ADMIN — OXYCODONE 2.5 MG: 5 TABLET ORAL at 12:36

## 2022-07-13 RX ADMIN — ACETAMINOPHEN 650 MG: 325 TABLET, FILM COATED ORAL at 16:59

## 2022-07-13 NOTE — ASSESSMENT & PLAN NOTE
· Seen and evaluated by General surgery  · Appears mass has been there for quite some time  · Status post bedside incisional biopsy 07/13/2022  · Pain control

## 2022-07-13 NOTE — PROGRESS NOTES
[facility]  Progress Note - [facility] 1935, 82 y o  male MRN: [id number]  Unit/Bed#: E2 -02 Encounter: [id number]  Primary Care Provider: No primary care provider on file  Date and time admitted to hospital: 7/9/2022 11:02 AM    * Cellulitis  Assessment & Plan  · Presented with left leg cellulitis, surrounding wounds, and growth on leg  · Maggots were found in the wound upon presentation to the ED  · Ct lower extremity: "Extensive subcutaneous edema likely representing cellulitis  Although evaluation for abscess is limited in the absence images contrast no obvious discrete collection is identified    Dermal soft tissue lesion seen in association with the posterior medial calf "  · Currently on IV Zosyn and vancomycin-day # 4  · Will transition to oral antibiotics on discharge  · Blood cultures negative thus far    Hypertension  Assessment & Plan  · Continue amlodipine    Type 2 diabetes mellitus with hyperglycemia, without long-term current use of insulin ([facility])  Assessment & Plan  · New onset diabetes mellitus  · Hemoglobin A1c checked and is 8 8  · Placed on insulin sliding scale here  · Goal for better control of sugars in the setting of acute infection  · Monitor sugars and consider long-acting insulin before bed    Leg mass  Assessment & Plan  · Seen and evaluated by General surgery  · Appears mass has been there for quite some time  · Status post bedside incisional biopsy 07/13/2022  · Pain control    AKUA (acute kidney injury) [facility])  Assessment & Plan  Results from last 7 days   Lab Units 07/13/22  0432 07/12/22  0613 07/11/22  0551 07/10/22  0517   CREATININE mg/dL 1 03 1 03 1 00 1 23   · 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  · Baseline creatinine around 0 9  · Renal ultrasound with normal appearing kidneys and bladder  · Presenting creatinine 1 56, improved  · Monitor renal function          VTE Pharmacologic Prophylaxis:   Pharmacologic:  Heparin    Patient Centered Rounds: I have performed bedside rounds with nursing staff today  Time Spent for Care: 20 minutes  More than 50% of total time spent on counseling and coordination of care as described above  Current Length of Stay: 4 day(s)    Current Patient Status: Inpatient   Certification Statement: The patient will continue to require additional inpatient hospital stay due to Cellulitis, IV antibiotics    Discharge Plan / Estimated Discharge Date: TBD    Code Status: Level 1 - Full Code      Subjective:   Patient seen and examined at bedside, comfortable, denies any complaints    Objective:     Vitals:   Temp (24hrs), Av 1 °F (36 7 °C), Min:97 7 °F (36 5 °C), Max:98 5 °F (36 9 °C)    Temp:  [97 7 °F (36 5 °C)-98 5 °F (36 9 °C)] 98 1 °F (36 7 °C)  HR:  [77-82] 77  Resp:  [18-20] 18  BP: (141-173)/(65-73) 147/65  SpO2:  [96 %-97 %] 96 %  Body mass index is 29 79 kg/m²  Input and Output Summary (last 24 hours): Intake/Output Summary (Last 24 hours) at 2022 1719  Last data filed at 2022 1540  Gross per 24 hour   Intake 330 ml   Output 250 ml   Net 80 ml       Physical Exam:    Constitutional: Patient is oriented to person, place and time, no acute distress  HEENT:  Normocephalic, atraumatic  Cardiovascular: Normal S1S2, RRR, No murmurs/rubs/gallops appreciated  Pulmonary:  Bilateral air entry, No rhonchi/rales/wheezing appreciated  Abdominal: Soft, Bowel sounds present, Non-tender, Non-distended  Extremities:  No cyanosis, clubbing or edema  Neurological: Cranial nerves II-XII grossly intact, sensation intact, otherwise no focal neurological symptoms     Skin:  Left lower extremity with dressing in place    Additional Data:     Labs:    Results from last 7 days   Lab Units 22  0531   WBC Thousand/uL 8 43   HEMOGLOBIN g/dL 15 3   HEMATOCRIT % 46 2   PLATELETS Thousands/uL 256   NEUTROS PCT % 53   LYMPHS PCT % 31   MONOS PCT % 9   EOS PCT % 5     Results from last 7 days   Lab Units 22  0432 07/10/22  0517 07/09/22  1159   POTASSIUM mmol/L 3 4*   < > 3 7   CHLORIDE mmol/L 103   < > 100   CO2 mmol/L 24   < > 30   BUN mg/dL 8   < > 17   CREATININE mg/dL 1 03   < > 1 56*   CALCIUM mg/dL 8 4   < > 9 0   ALK PHOS U/L  --   --  79   ALT U/L  --   --  22   AST U/L  --   --  19    < > = values in this interval not displayed  Results from last 7 days   Lab Units 07/09/22  1159   INR  1 04        I Have Reviewed All Lab Data Listed Above  Recent Cultures (last 7 days):     Results from last 7 days   Lab Units 07/09/22  1159   BLOOD CULTURE  No Growth at 72 hrs  No Growth at 72 hrs         Last 24 Hours Medication List:   Current Facility-Administered Medications   Medication Dose Route Frequency Provider Last Rate    acetaminophen  650 mg Oral Q6H PRN Vanessa Ambron, DO      amLODIPine  10 mg Oral Daily Frida Guerrero MD      heparin (porcine)  5,000 Units Subcutaneous [facility] Vanessa Ambron, DO      HYDROmorphone  0 2 mg Intravenous Q4H PRN Vanessa Ambron, DO      insulin lispro  1-5 Units Subcutaneous TID AC Vanessa Ambron, DO      insulin lispro  1-5 Units Subcutaneous HS Vanessa Ambron, DO      ondansetron  4 mg Intravenous Q6H PRN Vanessa Ambron, DO      oxyCODONE  2 5 mg Oral Q8H PRN Vanessa Ambron, DO      piperacillin-tazobactam  3 375 g Intravenous Q8H Vanessa Guadaluperon, DO 3 375 g (07/13/22 1650)    vancomycin  1,000 mg Intravenous Q24H Zeina Rehman DO          Today, Patient Was Seen By: Frida Guerrero MD

## 2022-07-13 NOTE — PHYSICAL THERAPY NOTE
PHYSICAL THERAPY TREATMENT NOTE  NAME:  London Bello  DATE: 07/13/22    Length Of Stay: 4  Performed at least 2 patient identifiers during session: Name and ID bracelet    TREATMENT:      07/13/22 1319   PT Last Visit   PT Visit Date 07/13/22   Note Type   Note Type Treatment   Pain Assessment   Pain Assessment Tool 0-10   Pain Score 10 - Worst Possible Pain   Pain Location/Orientation Orientation: Left; Location: Leg   Pain Onset/Description Frequency: Constant/Continuous; Descriptor: Aching   Effect of Pain on Daily Activities limits mobility   Patient's Stated Pain Goal No pain   Hospital Pain Intervention(s) Ambulation/increased activity;Repositioned;Elevated;Rest;Emotional support   Multiple Pain Sites No   Restrictions/Precautions   Weight Bearing Precautions Per Order No   Other Precautions Cognitive; Chair Alarm; Bed Alarm; Fall Risk;Pain   General   Chart Reviewed Yes   Response to Previous Treatment Patient with no complaints from previous session  Family/Caregiver Present No   Cognition   Overall Cognitive Status Impaired   Arousal/Participation Alert   Attention Within functional limits   Orientation Level Oriented to person;Oriented to place;Oriented to time   Memory Within functional limits   Following Commands Follows one step commands without difficulty   Comments pt agreeable to PT session   Bed Mobility   Supine to Sit 5  Supervision   Additional items Increased time required;Verbal cues;HOB elevated; Bedrails   Sit to Supine 5  Supervision   Additional items Increased time required; Bedrails;Verbal cues   Transfers   Sit to Stand 5  Supervision   Additional items Increased time required;Verbal cues   Stand to Sit 5  Supervision   Additional items Increased time required;Verbal cues   Additional Comments verbal cues for proper technique and safety   Ambulation/Elevation   Gait pattern Improper Weight shift;Decreased L stance;Decreased foot clearance; Short stride; Step to;Excessively slow Gait Assistance 5  Supervision   Additional items Verbal cues   Assistive Device Rolling walker   Distance 40 ftx1   Balance   Static Sitting Good   Dynamic Sitting Fair +   Static Standing Fair   Dynamic Standing Fair -   Ambulatory Fair -   Endurance Deficit   Endurance Deficit Yes   Activity Tolerance   Activity Tolerance Patient limited by pain; Patient limited by fatigue   Nurse Made Aware yes   Exercises   Quad Sets Supine;20 reps;AROM; Bilateral   Heelslides Supine;20 reps;AROM; Bilateral   Glute Sets Supine;20 reps;AROM; Bilateral   Hip Flexion Supine;20 reps;AROM; Bilateral   Hip Abduction Supine;20 reps;AROM; Bilateral   Hip Adduction Supine;20 reps;AROM; Bilateral   Knee AROM Short Arc Quad Supine;20 reps;AROM; Bilateral   Ankle Pumps Supine;20 reps;AROM; Bilateral   Assessment   Prognosis Good   Problem List Decreased strength;Decreased endurance; Impaired balance;Decreased mobility; Impaired judgement;Decreased safety awareness;Decreased cognition;Pain;Decreased skin integrity   Assessment Pt seen for PT treatment session this date with interventions consisting of gait training w/ emphasis on improving pt's ability to ambulate level surfaces x 40 ft with close S provided by therapist with RW, Therapeutic exercise consisting of: AROM 20 reps B LE in supine position and therapeutic activity consisting of training: bed mobility, supine<>sit transfers and sit<>stand transfers  Pt agreeable to PT treatment session upon arrival, pt found supine in bed w/ HOB elevated, in no apparent distress and responsive  In comparison to previous session, pt with improvements in distance ambulated  Post session: pt returned BTB, all needs in reach and RN notified of session findings/recommendations  Continue to recommend home with home health rehabilitation and increased supervision at time of d/c in order to maximize pt's functional independence and safety w/ mobility  Pt continues to be functioning below baseline level   PT will continue to see pt during current hospitalization in order to address the deficits listed above and provide interventions consistent w/ POC in effort to achieve STGs  Barriers to Discharge Decreased caregiver support   Barriers to Discharge Comments home alone   Plan   Treatment/Interventions Functional transfer training;LE strengthening/ROM; Therapeutic exercise; Endurance training;Bed mobility;Gait training;Cognitive reorientation;Patient/family training   Progress Progressing toward goals   PT Frequency 2-3x/wk   Recommendation   PT Discharge Recommendation Home with home health rehabilitation  (with increased supervion or supervised envirement)   AM-PAC Basic Mobility Inpatient   Turning in Bed Without Bedrails 4   Lying on Back to Sitting on Edge of Flat Bed 3   Moving Bed to Chair 3   Standing Up From Chair 3   Walk in Room 3   Climb 3-5 Stairs 3   Basic Mobility Inpatient Raw Score 19   Basic Mobility Standardized Score 42 48   Highest Level Of Mobility   JH-HLM Goal 6: Walk 10 steps or more   JH-HLM Achieved 7: Walk 25 feet or more   Education   Education Provided Mobility training;Assistive device   Patient Demonstrates verbal understanding;Reinforcement needed   End of Consult   Patient Position at End of Consult Supine;Bed/Chair alarm activated; All needs within reach       The patient's AM-PAC Basic Mobility Inpatient Short Form Raw Score is 19  A Raw score of greater than 16 suggests the patient may benefit from discharge to home  Please also refer to the recommendation of the Physical Therapist for safe discharge planning        Maria Esther Devlin PTA,PTA

## 2022-07-13 NOTE — PROCEDURES
Procedure Note:    Patient was positioned supine on his bed and identity and procedure, left lower extremity incisional biopsy, were confirmed  Verbal consent was obtained and he was in agreement  The left lower extremity fungating lesion and surrounding skin was prepped with betadine and injected circumferentially with 15cc 1% lidocaine with epinephrine  A 1cm x 0 5cm x 0 5cm piece of tissue was excised from the most anterior portion of the lesion and sent in formalin for pathology  The wound was noted to be hemostatic so it was redressed with xeroform, ABD, and wrapped with kerlex and an ACE  Patient tolerated procedure well without complication  Will follow up biopsy result

## 2022-07-13 NOTE — CASE MANAGEMENT
Case Management Discharge Planning Note    Patient name Janell Ball  Location [facility] 2 /E2 MS 5-* MRN [id number]  : 1935 Date 2022       Current Admission Date: 2022  Current Admission Diagnosis:Cellulitis   Patient Active Problem List    Diagnosis Date Noted    Hypertension 2022    AKUA (acute kidney injury) ([facility] ) 2022    Cellulitis 2022    Leg mass 2022    Type 2 diabetes mellitus with hyperglycemia, without long-term current use of insulin ([facility] ) 2022      LOS (days): 4  Geometric Mean LOS (GMLOS) (days): 3 10  Days to GMLOS:-0 8     OBJECTIVE:  Risk of Unplanned Readmission Score: 7 86         Current admission status: Inpatient   Preferred Pharmacy:   [address] [place] [address] [address] [address]  Phone: [phone] Fax: [phone]    Primary Care Provider: No primary care provider on file  Primary Insurance: MEDICARE  Secondary Insurance: [address],[facility] [employer]    DISCHARGE DETAILS:    Discharge planning discussed with[de-identified] Patient and daughter        CM contacted family/caregiver?: Yes  Were Treatment Team discharge recommendations reviewed with patient/caregiver?: Yes          Contacts  Patient Contacts: Kal Tompkins  Relationship to Patient[de-identified] Family  Contact Method: Phone  Phone Number: [phone]  Reason/Outcome: Continuity of Care, Discharge Planning              Other Referral/Resources/Interventions Provided:  Interventions: SNF, Assisted Living, Personal Care HOme, Other (Specify)  Government Services[de-identified] Adult Protective Services / Elder Abuse         Treatment Team Recommendation: Home with  [facility] (with increased supervision)                                            Additional Comments: CM spoke with pt's daughter, Maximino Pinzon, over the phone   Daughter reports that pt has been having increasing memory loss and has not been compliant with medical treatments or appointments  Daughter is fearful for him to return to his apartment without increased care but does not think he will be able to afford HHAs nor does she believe he would let them in to provide care  Daughter would like to explore [facility], personal care homes, or SNF for the pt  PT/OT have both documented a need for more supervision or a supervised environment  CM did talk with the pt about this along with the need for potential placement  Pt did not say no but he reported not being happy about this  When CM was leaving the room, pt stated he would most likely forget this conversation as he focuses only on his word searches  Potential need for neuropsych  CM did contact Feliciano Olson from a [employer] to reach out to the daughter about [facility] placements  CM also placed referrals for LTC  Daughter believes she has paperwork listing her as medical POA and does report her name being on his bank acocunt  CM to continue to follow

## 2022-07-13 NOTE — PLAN OF CARE
Problem: Potential for Falls  Goal: Patient will remain free of falls  Description: INTERVENTIONS:  - Educate patient/family on patient safety including physical limitations  - Instruct patient to call for assistance with activity   - Consult OT/PT to assist with strengthening/mobility   - Keep Call bell within reach  - Keep bed low and locked with side rails adjusted as appropriate  - Keep care items and personal belongings within reach  - Initiate and maintain comfort rounds  - Make Fall Risk Sign visible to staff  - Offer Toileting every 2 Hours, in advance of need  - Initiate/Maintain bed alarm  - Obtain necessary fall risk management equipment: at bedside  - Apply yellow socks and bracelet for high fall risk patients  - Consider moving patient to room near nurses station  Outcome: Progressing     Problem: Prexisting or High Potential for Compromised Skin Integrity  Goal: Skin integrity is maintained or improved  Description: INTERVENTIONS:  - Identify patients at risk for skin breakdown  - Assess and monitor skin integrity  - Assess and monitor nutrition and hydration status  - Monitor labs   - Assess for incontinence   - Turn and reposition patient  - Assist with mobility/ambulation  - Relieve pressure over bony prominences  - Avoid friction and shearing  - Provide appropriate hygiene as needed including keeping skin clean and dry  - Evaluate need for skin moisturizer/barrier cream  - Collaborate with interdisciplinary team   - Patient/family teaching  - Consider wound care consult   Outcome: Progressing     Problem: MOBILITY - ADULT  Goal: Maintain or return to baseline ADL function  Description: INTERVENTIONS:  -  Assess patient's ability to carry out ADLs; assess patient's baseline for ADL function and identify physical deficits which impact ability to perform ADLs (bathing, care of mouth/teeth, toileting, grooming, dressing, etc )  - Assess/evaluate cause of self-care deficits   - Assess range of motion  - Assess patient's mobility; develop plan if impaired  - Assess patient's need for assistive devices and provide as appropriate  - Encourage maximum independence but intervene and supervise when necessary  - Involve family in performance of ADLs  - Assess for home care needs following discharge   - Consider OT consult to assist with ADL evaluation and planning for discharge  - Provide patient education as appropriate  Outcome: Progressing  Goal: Maintains/Returns to pre admission functional level  Description: INTERVENTIONS:  - Perform BMAT or MOVE assessment daily    - Set and communicate daily mobility goal to care team and patient/family/caregiver  - Collaborate with rehabilitation services on mobility goals if consulted  - Perform Range of Motion 2 times a day  - Reposition patient every 2 hours    - Dangle patient 2 times a day  - Stand patient 2 times a day  - Ambulate patient 2 times a day  - Out of bed to chair 2 times a day   - Out of bed for meals 2 times a day  - Out of bed for toileting  - Record patient progress and toleration of activity level   Outcome: Progressing

## 2022-07-13 NOTE — ASSESSMENT & PLAN NOTE
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

## 2022-07-13 NOTE — ASSESSMENT & PLAN NOTE
· Presented with left leg cellulitis, surrounding wounds, and growth on leg  · Maggots were found in the wound upon presentation to the ED  · Ct lower extremity: "Extensive subcutaneous edema likely representing cellulitis  Although evaluation for abscess is limited in the absence images contrast no obvious discrete collection is identified    Dermal soft tissue lesion seen in association with the posterior medial calf "  · Currently on IV Zosyn and vancomycin-day # 4  · Will transition to oral antibiotics on discharge  · Blood cultures negative thus far

## 2022-07-14 PROBLEM — Z01.89 ENCOUNTER FOR COMPETENCY EVALUATION: Status: ACTIVE | Noted: 2022-01-01

## 2022-07-14 LAB
ANION GAP SERPL CALCULATED.3IONS-SCNC: 6 MMOL/L (ref 4–13)
BACTERIA BLD CULT: NORMAL
BACTERIA BLD CULT: NORMAL
BASOPHILS # BLD AUTO: 0.12 THOUSANDS/ΜL (ref 0–0.1)
BASOPHILS NFR BLD AUTO: 1 % (ref 0–1)
BUN SERPL-MCNC: 9 MG/DL (ref 5–25)
CALCIUM SERPL-MCNC: 8.5 MG/DL (ref 8.3–10.1)
CHLORIDE SERPL-SCNC: 105 MMOL/L (ref 100–108)
CO2 SERPL-SCNC: 28 MMOL/L (ref 21–32)
CREAT SERPL-MCNC: 0.99 MG/DL (ref 0.6–1.3)
EOSINOPHIL # BLD AUTO: 0.49 THOUSAND/ΜL (ref 0–0.61)
EOSINOPHIL NFR BLD AUTO: 5 % (ref 0–6)
ERYTHROCYTE [DISTWIDTH] IN BLOOD BY AUTOMATED COUNT: 13.2 % (ref 11.6–15.1)
GFR SERPL CREATININE-BSD FRML MDRD: 69 ML/MIN/1.73SQ M
GLUCOSE SERPL-MCNC: 118 MG/DL (ref 65–140)
GLUCOSE SERPL-MCNC: 119 MG/DL (ref 65–140)
GLUCOSE SERPL-MCNC: 130 MG/DL (ref 65–140)
GLUCOSE SERPL-MCNC: 134 MG/DL (ref 65–140)
GLUCOSE SERPL-MCNC: 158 MG/DL (ref 65–140)
HCT VFR BLD AUTO: 50.2 % (ref 36.5–49.3)
HGB BLD-MCNC: 16.3 G/DL (ref 12–17)
IMM GRANULOCYTES # BLD AUTO: 0.18 THOUSAND/UL (ref 0–0.2)
IMM GRANULOCYTES NFR BLD AUTO: 2 % (ref 0–2)
LYMPHOCYTES # BLD AUTO: 3.26 THOUSANDS/ΜL (ref 0.6–4.47)
LYMPHOCYTES NFR BLD AUTO: 33 % (ref 14–44)
MCH RBC QN AUTO: 29.6 PG (ref 26.8–34.3)
MCHC RBC AUTO-ENTMCNC: 32.5 G/DL (ref 31.4–37.4)
MCV RBC AUTO: 91 FL (ref 82–98)
MONOCYTES # BLD AUTO: 0.83 THOUSAND/ΜL (ref 0.17–1.22)
MONOCYTES NFR BLD AUTO: 8 % (ref 4–12)
NEUTROPHILS # BLD AUTO: 5.15 THOUSANDS/ΜL (ref 1.85–7.62)
NEUTS SEG NFR BLD AUTO: 51 % (ref 43–75)
NRBC BLD AUTO-RTO: 0 /100 WBCS
PLATELET # BLD AUTO: 298 THOUSANDS/UL (ref 149–390)
PMV BLD AUTO: 10.7 FL (ref 8.9–12.7)
POTASSIUM SERPL-SCNC: 3.5 MMOL/L (ref 3.5–5.3)
RBC # BLD AUTO: 5.51 MILLION/UL (ref 3.88–5.62)
SODIUM SERPL-SCNC: 139 MMOL/L (ref 136–145)
WBC # BLD AUTO: 10.03 THOUSAND/UL (ref 4.31–10.16)

## 2022-07-14 PROCEDURE — 80048 BASIC METABOLIC PNL TOTAL CA: CPT | Performed by: INTERNAL MEDICINE

## 2022-07-14 PROCEDURE — 82948 REAGENT STRIP/BLOOD GLUCOSE: CPT

## 2022-07-14 PROCEDURE — 85025 COMPLETE CBC W/AUTO DIFF WBC: CPT | Performed by: INTERNAL MEDICINE

## 2022-07-14 PROCEDURE — 97129 THER IVNTJ 1ST 15 MIN: CPT

## 2022-07-14 PROCEDURE — 99232 SBSQ HOSP IP/OBS MODERATE 35: CPT | Performed by: INTERNAL MEDICINE

## 2022-07-14 RX ORDER — ASPIRIN 81 MG/1
81 TABLET ORAL DAILY
Status: DISCONTINUED | OUTPATIENT
Start: 2022-07-14 | End: 2022-08-05 | Stop reason: HOSPADM

## 2022-07-14 RX ADMIN — INSULIN LISPRO 1 UNITS: 100 INJECTION, SOLUTION INTRAVENOUS; SUBCUTANEOUS at 11:37

## 2022-07-14 RX ADMIN — HEPARIN SODIUM 5000 UNITS: 5000 INJECTION INTRAVENOUS; SUBCUTANEOUS at 13:01

## 2022-07-14 RX ADMIN — HEPARIN SODIUM 5000 UNITS: 5000 INJECTION INTRAVENOUS; SUBCUTANEOUS at 22:16

## 2022-07-14 RX ADMIN — PIPERACILLIN AND TAZOBACTAM 3.38 G: 3; .375 INJECTION, POWDER, LYOPHILIZED, FOR SOLUTION INTRAVENOUS at 22:15

## 2022-07-14 RX ADMIN — OXYCODONE 2.5 MG: 5 TABLET ORAL at 20:21

## 2022-07-14 RX ADMIN — ACETAMINOPHEN 650 MG: 325 TABLET, FILM COATED ORAL at 06:21

## 2022-07-14 RX ADMIN — PIPERACILLIN AND TAZOBACTAM 3.38 G: 3; .375 INJECTION, POWDER, LYOPHILIZED, FOR SOLUTION INTRAVENOUS at 06:11

## 2022-07-14 RX ADMIN — OXYCODONE 2.5 MG: 5 TABLET ORAL at 03:46

## 2022-07-14 RX ADMIN — OXYCODONE 2.5 MG: 5 TABLET ORAL at 12:34

## 2022-07-14 RX ADMIN — ASPIRIN 81 MG: 81 TABLET, COATED ORAL at 14:47

## 2022-07-14 RX ADMIN — HEPARIN SODIUM 5000 UNITS: 5000 INJECTION INTRAVENOUS; SUBCUTANEOUS at 06:10

## 2022-07-14 RX ADMIN — PIPERACILLIN AND TAZOBACTAM 3.38 G: 3; .375 INJECTION, POWDER, LYOPHILIZED, FOR SOLUTION INTRAVENOUS at 14:32

## 2022-07-14 RX ADMIN — AMLODIPINE BESYLATE 10 MG: 10 TABLET ORAL at 08:12

## 2022-07-14 NOTE — CASE MANAGEMENT
Case Management Progress Note    Patient name Corinne Gay  [facility] /[facility] -* MRN [id number]  : 1935 Date 2022       LOS (days): 5  Geometric Mean LOS (GMLOS) (days): 3 20  Days to GMLOS:-1 8        OBJECTIVE:        Current admission status: Inpatient  Preferred Pharmacy:   [address]  Jani Bourgeois [address] [place] [id number]  Phone: [phone] Fax: [phone]    Primary Care Provider: No primary care provider on file  Primary Insurance: MEDICARE  Secondary Insurance: Lanie DUNCAN    PROGRESS NOTE:    Called placed to pt's daughter, Damion Wilson, to discuss neuropsych evaluation and discuss placement  VM left requesting return call  CM to continue to follow

## 2022-07-14 NOTE — PLAN OF CARE
Problem: OCCUPATIONAL THERAPY ADULT  Goal: Performs self-care activities at highest level of function for planned discharge setting  See evaluation for individualized goals  Description: Treatment Interventions: Cognitive reorientation, Continued evaluation          See flowsheet documentation for full assessment, interventions and recommendations  Note: Limitation: Decreased ADL status, Decreased UE strength, Decreased Safe judgement during ADL, Decreased cognition, Decreased endurance, Decreased high-level ADLs  Prognosis: Fair  Assessment: Pt seen for 15min tx session with focus on cognition  Pt able to completed the ACLS with a score of 4 8; see below noted for details of 4 8 score  Pt noted with cognitive deficits pertaining to attention, memory, higher-level direction-following, and problem-solving  Pt with limited knowledge of cognitive deficits  Pt continues to demonstrate need for a more supervised environment for final d/c destination  Tx tolerated well  Pt pleasant and cooperative with tx session  Will continue       OT Discharge Recommendation:  (continue PT/OT, supervised environment)

## 2022-07-14 NOTE — PROGRESS NOTES
Vancomycin IV Pharmacy-to-Dose Consultation    Ameena Medina is a 82 y o  male who is currently receiving Vancomycin IV with management by the Pharmacy Consult service  Assessment/Plan:  The patient was reviewed  Renal function is stable and no signs or symptoms of nephrotoxicity and/or infusion reactions were documented in the chart  Based on todays assessment, continue current vancomycin (day # 6) dosing of 1 g q24h, with a plan for trough to be drawn at 2300 on 7/16  We will continue to follow the patients culture results and clinical progress daily      Zane Caicedo, Pharmacist

## 2022-07-14 NOTE — PLAN OF CARE
Problem: Prexisting or High Potential for Compromised Skin Integrity  Goal: Skin integrity is maintained or improved  Description: INTERVENTIONS:  - Identify patients at risk for skin breakdown  - Assess and monitor skin integrity  - Assess and monitor nutrition and hydration status  - Monitor labs   - Assess for incontinence   - Turn and reposition patient  - Assist with mobility/ambulation  - Relieve pressure over bony prominences  - Avoid friction and shearing  - Provide appropriate hygiene as needed including keeping skin clean and dry  - Evaluate need for skin moisturizer/barrier cream  - Collaborate with interdisciplinary team   - Patient/family teaching  - Consider wound care consult   Outcome: Progressing     Problem: MOBILITY - ADULT  Goal: Maintain or return to baseline ADL function  Description: INTERVENTIONS:  -  Assess patient's ability to carry out ADLs; assess patient's baseline for ADL function and identify physical deficits which impact ability to perform ADLs (bathing, care of mouth/teeth, toileting, grooming, dressing, etc )  - Assess/evaluate cause of self-care deficits   - Assess range of motion  - Assess patient's mobility; develop plan if impaired  - Assess patient's need for assistive devices and provide as appropriate  - Encourage maximum independence but intervene and supervise when necessary  - Involve family in performance of ADLs  - Assess for home care needs following discharge   - Consider OT consult to assist with ADL evaluation and planning for discharge  - Provide patient education as appropriate  Outcome: Progressing  Goal: Maintains/Returns to pre admission functional level  Description: INTERVENTIONS:  - Perform BMAT or MOVE assessment daily    - Set and communicate daily mobility goal to care team and patient/family/caregiver  - Collaborate with rehabilitation services on mobility goals if consulted  - Perform Range of Motion 3 times a day    - Reposition patient every 2 hours   - Dangle patient 3 times a day  - Stand patient 3 times a day  - Ambulate patient 3 times a day  - Out of bed to chair 3 times a day   - Out of bed for meals 3 times a day  - Out of bed for toileting  - Record patient progress and toleration of activity level   Outcome: Progressing     Problem: Potential for Falls  Goal: Patient will remain free of falls  Description: INTERVENTIONS:  - Educate patient/family on patient safety including physical limitations  - Instruct patient to call for assistance with activity   - Consult OT/PT to assist with strengthening/mobility   - Keep Call bell within reach  - Keep bed low and locked with side rails adjusted as appropriate  - Keep care items and personal belongings within reach  - Initiate and maintain comfort rounds  - Make Fall Risk Sign visible to staff  - Offer Toileting every 2 Hours, in advance of need  - Initiate/Maintain bed alarm  - Obtain necessary fall risk management equipment: walker  - Apply yellow socks and bracelet for high fall risk patients  - Consider moving patient to room near nurses station  Outcome: Progressing

## 2022-07-14 NOTE — PROGRESS NOTES
[address] [facility]  Progress Note - Kaity Espinosa 1935, 82 y o  male MRN: [id number]  Unit/Bed#: E2 -02 Encounter: [id number]  Primary Care Provider: No primary care provider on file  Date and time admitted to hospital: 7/9/2022 11:02 AM    * Cellulitis  Assessment & Plan  · Presented with left leg cellulitis, surrounding wounds, and growth on leg  · Maggots were found in the wound upon presentation to the ED  · Ct lower extremity: "Extensive subcutaneous edema likely representing cellulitis  Although evaluation for abscess is limited in the absence images contrast no obvious discrete collection is identified    Dermal soft tissue lesion seen in association with the posterior medial calf "  · Currently on IV Zosyn and vancomycin-day # 5, will dc vanco  · Will transition to oral antibiotics on discharge  · Blood cultures negative thus far    Hypertension  Assessment & Plan  · Continue amlodipine    Type 2 diabetes mellitus with hyperglycemia, without long-term current use of insulin ([facility])  Assessment & Plan  · New onset diabetes mellitus  · Hemoglobin A1c checked and is 8 8  · Placed on insulin sliding scale here  · Goal for better control of sugars in the setting of acute infection  · Monitor sugars and consider long-acting insulin before bed    Leg mass  Assessment & Plan  · Seen and evaluated by General surgery  · Appears mass has been there for quite some time  · Status post bedside incisional biopsy 07/13/2022  · Pain control    AKUA (acute kidney injury) [facility])  Assessment & Plan  Results from last 7 days   Lab Units 07/14/22  0430 07/13/22  0432 07/12/22  0613 07/11/22  0551   CREATININE mg/dL 0 99 1 03 1 03 1 00   · 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  · Baseline creatinine around 0 9  · Renal ultrasound with normal appearing kidneys and bladder  · Presenting creatinine 1 56, improved  · Monitor renal function          VTE Pharmacologic Prophylaxis:   Pharmacologic:  Heparin    Patient Centered Rounds: I have performed bedside rounds with nursing staff today  Education and Discussions with Family / Patient: Updated daughter May Hannah    Time Spent for Care: 20 minutes  More than 50% of total time spent on counseling and coordination of care as described above  Current Length of Stay: 5 day(s)    Current Patient Status: Inpatient   Certification Statement: The patient will continue to require additional inpatient hospital stay due to Left lower extremity cellulitis    Discharge Plan / Estimated Discharge Date: TBD    Code Status: Level 1 - Full Code      Subjective:   Patient seen and examined at bedside, sitting upright, denies any complaints    Objective:     Vitals:   Temp (24hrs), Av 8 °F (36 6 °C), Min:97 5 °F (36 4 °C), Max:98 1 °F (36 7 °C)    Temp:  [97 5 °F (36 4 °C)-98 1 °F (36 7 °C)] 97 5 °F (36 4 °C)  HR:  [77-86] 86  Resp:  [18-20] 18  BP: (139-173)/(65-83) 173/76  SpO2:  [93 %-97 %] 93 %  Body mass index is 29 79 kg/m²  Input and Output Summary (last 24 hours): Intake/Output Summary (Last 24 hours) at 2022 1420  Last data filed at 2022 1303  Gross per 24 hour   Intake --   Output 1050 ml   Net -1050 ml       Physical Exam:    Constitutional: Patient is oriented to person, place and time, no acute distress  HEENT:  Normocephalic, atraumatic  Cardiovascular: Normal S1S2, RRR, No murmurs/rubs/gallops appreciated  Pulmonary:  Bilateral air entry, No rhonchi/rales/wheezing appreciated  Abdominal: Soft, Bowel sounds present, Non-tender, Non-distended  Extremities:  No cyanosis, clubbing or edema  Neurological: Cranial nerves II-XII grossly intact, sensation intact, otherwise no focal neurological symptoms     Skin:  LLE with dressing in place    Additional Data:     Labs:    Results from last 7 days   Lab Units 22  0430   WBC Thousand/uL 10 03   HEMOGLOBIN g/dL 16 3   HEMATOCRIT % 50 2*   PLATELETS Thousands/uL 298   NEUTROS PCT % 51   LYMPHS PCT % 33 MONOS PCT % 8   EOS PCT % 5     Results from last 7 days   Lab Units 07/14/22  0430 07/10/22  0517 07/09/22  1159   POTASSIUM mmol/L 3 5   < > 3 7   CHLORIDE mmol/L 105   < > 100   CO2 mmol/L 28   < > 30   BUN mg/dL 9   < > 17   CREATININE mg/dL 0 99   < > 1 56*   CALCIUM mg/dL 8 5   < > 9 0   ALK PHOS U/L  --   --  79   ALT U/L  --   --  22   AST U/L  --   --  19    < > = values in this interval not displayed  Results from last 7 days   Lab Units 07/09/22  1159   INR  1 04        I Have Reviewed All Lab Data Listed Above  Recent Cultures (last 7 days):     Results from last 7 days   Lab Units 07/09/22  1159   BLOOD CULTURE  No Growth After 4 Days  No Growth After 4 Days         Last 24 Hours Medication List:   Current Facility-Administered Medications   Medication Dose Route Frequency Provider Last Rate    acetaminophen  650 mg Oral Q6H PRN Vanessa Ambron, DO      amLODIPine  10 mg Oral Daily Jose G Scales MD      heparin (porcine)  5,000 Units Subcutaneous [facility] Vanessa Ambron, DO      HYDROmorphone  0 2 mg Intravenous Q4H PRN Vanessa Ambron, DO      insulin lispro  1-5 Units Subcutaneous TID AC Vanessa Ambron, DO      insulin lispro  1-5 Units Subcutaneous HS Vanessa Ambron, DO      ondansetron  4 mg Intravenous Q6H PRN Vanessa Ambron, DO      oxyCODONE  2 5 mg Oral Q8H PRN Vanessa Ambron, DO      piperacillin-tazobactam  3 375 g Intravenous Q8H Vanessa Ambron, DO 3 375 g (07/14/22 7044)        Today, Patient Was Seen By: Jose G Scales MD

## 2022-07-14 NOTE — ASSESSMENT & PLAN NOTE
Results from last 7 days   Lab Units 07/14/22  0430 07/13/22  0432 07/12/22  0613 07/11/22  0551   CREATININE mg/dL 0 99 1 03 1 03 1 00   · 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  · Baseline creatinine around 0 9  · Renal ultrasound with normal appearing kidneys and bladder  · Presenting creatinine 1 56, improved  · Monitor renal function

## 2022-07-14 NOTE — ASSESSMENT & PLAN NOTE
· Presented with left leg cellulitis, surrounding wounds, and growth on leg  · Maggots were found in the wound upon presentation to the ED  · Ct lower extremity: "Extensive subcutaneous edema likely representing cellulitis  Although evaluation for abscess is limited in the absence images contrast no obvious discrete collection is identified    Dermal soft tissue lesion seen in association with the posterior medial calf "  · Currently on IV Zosyn and vancomycin-day # 5, will dc vanco  · Will transition to oral antibiotics on discharge  · Blood cultures negative thus far

## 2022-07-14 NOTE — OCCUPATIONAL THERAPY NOTE
Occupational Therapy Progress Note([id number])     Patient Name: Donald Munguia  KWRJS'LELO Date: 7/14/2022  Problem List  Principal Problem:    Cellulitis  Active Problems:    AKUA (acute kidney injury) ([facility] [address] )    Leg mass    Type 2 diabetes mellitus with hyperglycemia, without long-term current use of insulin ([facility] )    Hypertension    Encounter for competency evaluation            07/14/22 6040   Note Type   Note Type Treatment   Restrictions/Precautions   Weight Bearing Precautions Per Order No   Other Precautions Cognitive; Fall Risk;Pain   Pain Assessment   Pain Assessment Tool 0-10   Pain Score 4   Pain Location/Orientation Orientation: Left; Location: Leg   Pain Rating: FLACC (Rest) - Face 0   Pain Rating: FLACC (Rest) - Legs 0   Pain Rating: FLACC (Rest) - Activity 0   Pain Rating: FLACC (Rest) - Cry 0   Cognition   Overall Cognitive Status Impaired   Arousal/Participation Alert   Attention Attends with cues to redirect   Memory Decreased short term memory;Decreased recall of precautions   Following Commands Follows one step commands without difficulty   Cognition Assessment Tools ACLS   Score 4 8   Activity Tolerance   Activity Tolerance Patient tolerated treatment well   Medical Staff Made Aware nsg   Assessment   Assessment Pt seen for 15min tx session with focus on cognition  Pt able to completed the ACLS with a score of 4 8; see below noted for details of 4 8 score  Pt noted with cognitive deficits pertaining to attention, memory, higher-level direction-following, and problem-solving  Pt with limited knowledge of cognitive deficits  Pt continues to demonstrate need for a more supervised environment for final d/c destination  Tx tolerated well  Pt pleasant and cooperative with tx session  Will continue     Plan   Treatment Interventions Cognitive reorientation;Continued evaluation   Goal Expiration Date 07/26/22   OT Treatment Day 1   OT Frequency 2-3x/wk   Recommendation   OT Discharge Recommendation (continue PT/OT, supervised environment)   AM-PAC Daily Activity Inpatient   Lower Body Dressing 3   Bathing 3   Toileting 3   Upper Body Dressing 3   Grooming 4   Eating 4   Daily Activity Raw Score 20   Daily Activity Standardized Score (Calc for Raw Score >=11) 42 03   AM-PAC Applied Cognition Inpatient   Following a Speech/Presentation 3   Understanding Ordinary Conversation 3   Taking Medications 3   Remembering Where Things Are Placed or Put Away 3   Remembering List of 4-5 Errands 2   Taking Care of Complicated Tasks 2   Applied Cognition Raw Score 16   Applied Cognition Standardized Score 35 03   4 8    Administered Anil Cognitive Level Screen (ACLS)  The patient scored 4 8/6 0 indicating that they may live alone with daily assistance to monitor personal safety and check problem solving effectiveness  Behavior:  Asks for verification  Knows how 2 concurrent schedules fit together  Fits daily routine into schedule of others  Can avoid obvious hazards  More flexible and willing to adapt to new ideas  Insight into disability is fair  Processing is delayed  May be able to get to a regularly scheduled appointment without need for assistance  May frequently stop a task to examine objects  Seeks verification from others  Grooming:  Checks results of grooming, hair styling and make up application  Learns new grooming procedures slowly  May rigidly follow prescribed routines  Dressing: Considers all striking features of garments including color, pattern, condition and fit  May not attend to cleanliness or consider social standards  May don clothing slowly and check results in mirror  Bathing:  May coordinate bathing schedule with others  Attends to all features of bathing environment  May check results and vary rate  Reports low supplies to caregivers  Walking/Exercising:  Learns new routes over several days    Scans environment, reads street signs and attempts to use this information but may still get lost   Understands explanations of safety hazards  Learns an exercise program and does it without variation  Eating:  Eats and attends to social conversation though may not be able to talk and eat at the same time  Manages all utensils  Follows a meal time schedule  May be reminded to check food temperatures  Toileting:  Independently performs usual toileting routine  May not anticipate use of bathroom before trips  May learn bathroom etiquette like conserving paper  Medications: Follows new prescriptions slowly  Considers variables like time of day and amount but with difficulty  Follows verbal explanations  May follow a set schedule set up by others for renewing prescriptions  Utilize a daily pill box (set up by someone else)  Use of adaptive equipment:  Recognizes loss of strength, sensation and balance  May learn a series of new actions slowly  Housekeeping:  Initiates and completes a routine of housekeeping activities  Learns new procedures slowly  Notes all visible effects of cleaning and checking results  Corrects visible errors one at a time  May not anticipate supplies but reports low supplies to caregiver  May not identify potential hazards related to electric, gas, toxins, poisons and improper storage/disposal of items  Food preparation:  Does not plan for long term food needs  May learn to follow a plan for purchasing food items  May follow a weekly schedule for shopping  Memorizes a new sequence of actions to prepare dishes  Follows a written recipe in a rigid manner  Check stove after use  Spending Money:  Slowly follows budget or learns money management procedures  May need assistance to adjust to change in income or usual banking procedures  Shopping: Learns directions to new shopping areas  May learn to use a shopping list or use money saving procedures like coupons  May not read labels or consider whether it is practical or affordable    Laundry: Completes laundry routine in a fixed manner  Memorizes procedures like using a new [employer]  May try to read new labels but needs assistance in interpreting them  May overload machine  Traveling:  Learns new routes or travel procedures slowly  May learn to use a wheelchair but has difficulty maneuvering or estimating space requirements  Does not anticipate hazards  May follow safety precautions pointed out by others  Telephone:  Learns to use a new telephone answering machine or pay phone slowly  Does not vary actions  May memorize correct times to call others     Driving:  Should NOT operate a motor vehicle         Ngoc Ndiaye, OT

## 2022-07-14 NOTE — PLAN OF CARE
Problem: Potential for Falls  Goal: Patient will remain free of falls  Description: INTERVENTIONS:  - Educate patient/family on patient safety including physical limitations  - Instruct patient to call for assistance with activity   - Consult OT/PT to assist with strengthening/mobility   - Keep Call bell within reach  - Keep bed low and locked with side rails adjusted as appropriate  - Keep care items and personal belongings within reach  - Initiate and maintain comfort rounds  - Make Fall Risk Sign visible to staff  - Offer Toileting every 2 Hours, in advance of need  - Initiate/Maintain bed alarm  - Obtain necessary fall risk management equipment  - Apply yellow socks and bracelet for high fall risk patients  - Consider moving patient to room near nurses station  Outcome: Progressing     Problem: Prexisting or High Potential for Compromised Skin Integrity  Goal: Skin integrity is maintained or improved  Description: INTERVENTIONS:  - Identify patients at risk for skin breakdown  - Assess and monitor skin integrity  - Assess and monitor nutrition and hydration status  - Monitor labs   - Assess for incontinence   - Turn and reposition patient  - Assist with mobility/ambulation  - Relieve pressure over bony prominences  - Avoid friction and shearing  - Provide appropriate hygiene as needed including keeping skin clean and dry  - Evaluate need for skin moisturizer/barrier cream  - Collaborate with interdisciplinary team   - Patient/family teaching  - Consider wound care consult   Outcome: Progressing     Problem: MOBILITY - ADULT  Goal: Maintain or return to baseline ADL function  Description: INTERVENTIONS:  -  Assess patient's ability to carry out ADLs; assess patient's baseline for ADL function and identify physical deficits which impact ability to perform ADLs (bathing, care of mouth/teeth, toileting, grooming, dressing, etc )  - Assess/evaluate cause of self-care deficits   - Assess range of motion  - Assess patient's mobility; develop plan if impaired  - Assess patient's need for assistive devices and provide as appropriate  - Encourage maximum independence but intervene and supervise when necessary  - Involve family in performance of ADLs  - Assess for home care needs following discharge   - Consider OT consult to assist with ADL evaluation and planning for discharge  - Provide patient education as appropriate  Outcome: Progressing  Goal: Maintains/Returns to pre admission functional level  Description: INTERVENTIONS:  - Perform BMAT or MOVE assessment daily    - Set and communicate daily mobility goal to care team and patient/family/caregiver  - Collaborate with rehabilitation services on mobility goals if consulted  - Perform Range of Motion 3 times a day  - Reposition patient every 2 hours    - Dangle patient 3 times a day  - Stand patient 3 times a day  - Ambulate patient 3 times a day  - Out of bed to chair 3 times a day   - Out of bed for meals 3 times a day  - Out of bed for toileting  - Record patient progress and toleration of activity level   Outcome: Progressing

## 2022-07-14 NOTE — CONSULTS
Consultation - Neuropsychology/Psychology Department  London Bello 82 y o  male MRN: [id number]  Unit/Bed#: E2 -02 Encounter: [id number]        Reason for Consultation:  London Bello is a 82y o  year old male who was referred for a Neuropsychological Exam to assess cognitive functioning and comment on capacity to make informed medical decisions  History of Present Illness  leg swelling, redness,, maggots found in wound    Physician Requesting Consult: Tam Waterman MD    PROBLEM LIST:  Patient Active Problem List   Diagnosis    [facility]UA [facility]) ([facility] [address] )    Cellulitis    Leg mass    Type 2 diabetes mellitus with hyperglycemia, without long-term current use of insulin ([facility] )    Hypertension         Historical Information   Past Medical History:   Diagnosis Date    Hx of heart artery stent      Past Surgical History:   Procedure Laterality Date    BACK SURGERY       Social History   Social History     Substance and Sexual Activity   Alcohol Use Never     Social History     Substance and Sexual Activity   Drug Use Never     Social History     Tobacco Use   Smoking Status Former Smoker   Smokeless Tobacco Never Used     Family History: History reviewed  No pertinent family history      Meds/Allergies   current meds:   Current Facility-Administered Medications   Medication Dose Route Frequency    acetaminophen (TYLENOL) tablet 650 mg  650 mg Oral Q6H PRN    amLODIPine (NORVASC) tablet 10 mg  10 mg Oral Daily    heparin (porcine) subcutaneous injection 5,000 Units  5,000 Units Subcutaneous Q8H [address]    HYDROmorphone HCl (DILAUDID) injection 0 2 mg  0 2 mg Intravenous Q4H PRN    insulin lispro (HumaLOG) 100 units/mL subcutaneous injection 1-5 Units  1-5 Units Subcutaneous TID AC    insulin lispro (HumaLOG) 100 units/mL subcutaneous injection 1-5 Units  1-5 Units Subcutaneous HS    ondansetron (ZOFRAN) injection 4 mg  4 mg Intravenous Q6H PRN    oxyCODONE (ROXICODONE) IR tablet 2 5 mg  2 5 mg Oral Q8H PRN    piperacillin-tazobactam (ZOSYN) 3 375 g in sodium chloride 0 9 % 100 mL IVPB  3 375 g Intravenous Q8H    vancomycin (VANCOCIN) IVPB (premix in dextrose) 1,000 mg 200 mL  1,000 mg Intravenous Q24H       No Known Allergies      Family and Social Support:   Discharge planning discussed with[de-identified] Patient and daughter      Behavioral Observations: Alert, oriented except for name of city, day/week and day/month; affect appeared pleasant and patient denied depressed mood, anxiety, and admitted to increasing difficulty with recall; no overt evidence of psychotic process; patient understood reason for hospitalization but denied medical history;    Cognitive Examination    General Cognitive Functioning MMSE = Deficits in aspects of orientation, recall; Attention/Concentration Auditory Selective Attention = Average; Auditory Vigilance = Within Normal Limits; Information Processing Speed = Within Normal Limits    Frontal Systems/Executive Functioning Mental Flexibility/Cognitive Control = Impaired; Working Memory = Average Abstract Reasoning = Impaired; Generative Ability = Impaired, Commonsense Reasoning and Judgement = Impaired    Language Functioning Phonemic Fluency = Impaired; Semantic Retrieval = Impaired; Comprehension of Complex Ideational Material = Impaired;     Memory Functioning Narrative Recall - Short Delay = Average; Long Delay Narrative Recall = Impaired;  Three word recall = Impaired 0/3    Visuo-Spatial Abilities Not Assessed    Functional Knowledge  Health & Safety Knowledge = Impaired;    Summary/Impression:  Results of Neuropsychological Exam revealed cognitive deficits in declarative memory, abstract reasoning ability, commonsense reasoning and judgment, generative ability, semantic retrieval, comprehension of complex ideational material and on a measure assessing awareness of personal health status and ability to evaluate health problems, handle medical emergencies and take safety precautions, patient performed in the IMPAIRED range of functioning  At this time, patient does not appear to have capacity to make fully informed medical decisions  Unspecified Neurocognitive Disorder

## 2022-07-15 LAB
GLUCOSE SERPL-MCNC: 119 MG/DL (ref 65–140)
GLUCOSE SERPL-MCNC: 120 MG/DL (ref 65–140)
GLUCOSE SERPL-MCNC: 125 MG/DL (ref 65–140)
GLUCOSE SERPL-MCNC: 129 MG/DL (ref 65–140)

## 2022-07-15 PROCEDURE — 97530 THERAPEUTIC ACTIVITIES: CPT

## 2022-07-15 PROCEDURE — 97116 GAIT TRAINING THERAPY: CPT

## 2022-07-15 PROCEDURE — 99232 SBSQ HOSP IP/OBS MODERATE 35: CPT | Performed by: INTERNAL MEDICINE

## 2022-07-15 PROCEDURE — 82948 REAGENT STRIP/BLOOD GLUCOSE: CPT

## 2022-07-15 RX ADMIN — OXYCODONE 2.5 MG: 5 TABLET ORAL at 17:42

## 2022-07-15 RX ADMIN — PIPERACILLIN AND TAZOBACTAM 3.38 G: 3; .375 INJECTION, POWDER, LYOPHILIZED, FOR SOLUTION INTRAVENOUS at 08:34

## 2022-07-15 RX ADMIN — AMLODIPINE BESYLATE 10 MG: 10 TABLET ORAL at 08:34

## 2022-07-15 RX ADMIN — ASPIRIN 81 MG: 81 TABLET, COATED ORAL at 08:34

## 2022-07-15 RX ADMIN — PIPERACILLIN AND TAZOBACTAM 3.38 G: 3; .375 INJECTION, POWDER, LYOPHILIZED, FOR SOLUTION INTRAVENOUS at 15:50

## 2022-07-15 RX ADMIN — HEPARIN SODIUM 5000 UNITS: 5000 INJECTION INTRAVENOUS; SUBCUTANEOUS at 22:46

## 2022-07-15 RX ADMIN — OXYCODONE 2.5 MG: 5 TABLET ORAL at 10:35

## 2022-07-15 RX ADMIN — PIPERACILLIN AND TAZOBACTAM 3.38 G: 3; .375 INJECTION, POWDER, LYOPHILIZED, FOR SOLUTION INTRAVENOUS at 22:46

## 2022-07-15 RX ADMIN — HEPARIN SODIUM 5000 UNITS: 5000 INJECTION INTRAVENOUS; SUBCUTANEOUS at 07:00

## 2022-07-15 RX ADMIN — HEPARIN SODIUM 5000 UNITS: 5000 INJECTION INTRAVENOUS; SUBCUTANEOUS at 15:50

## 2022-07-15 NOTE — PHYSICAL THERAPY NOTE
Physical Therapy Treatment Note     07/15/22 1038   PT Last Visit   PT Visit Date 07/15/22   Note Type   Note Type Treatment   Pain Assessment   Pain Assessment Tool 0-10   Pain Score 7   Pain Location/Orientation Orientation: Left; Location: Leg   Restrictions/Precautions   Weight Bearing Precautions Per Order No   Other Precautions Fall Risk;Pain;Bed Alarm; Chair Alarm   General   Chart Reviewed Yes   Family/Caregiver Present No   Subjective   Subjective pt  agreeable to PT  Seated on chair upon entry   Bed Mobility   Sit to Supine 5  Supervision   Additional items Assist x 1;HOB elevated; Bedrails; Increased time required   Transfers   Sit to Stand 5  Supervision   Additional items Armrests; Increased time required;Assist x 1   Stand to Sit 5  Supervision   Additional items Assist x 1; Armrests; Increased time required   Stand pivot 5  Supervision   Additional items Increased time required   Toilet transfer 5  Supervision   Additional items Increased time required;Standard toilet   Ambulation/Elevation   Gait pattern Improper Weight shift;Decreased foot clearance; Foward flexed; Short stride; Excessively slow;Decreased heel strike;Decreased toe off   Gait Assistance 5  Supervision   Additional items Verbal cues   Assistive Device Rolling walker   Distance 200ft   Balance   Static Sitting Good   Dynamic Sitting Fair +   Static Standing Fair   Dynamic Standing Fair -   Endurance Deficit   Endurance Deficit Yes   Endurance Deficit Description pain   Activity Tolerance   Activity Tolerance Patient tolerated treatment well;Patient limited by pain   Nurse Made Aware yes   Exercises   TKR Sitting;Bilateral;AROM;20 reps  (laq and marches)   Assessment   Prognosis Good   Problem List Decreased strength;Decreased range of motion;Decreased endurance;Decreased mobility;Pain;Decreased skin integrity; Decreased cognition   Assessment Pt  progessing well with mobility  pt  able to ambulate longer distance this session   pt  limited due to pain in his LLE  Pt  performed toielting and all transfers with S  Pt  performed sit to supine at the end of session with S  Bed alarm enagged and all needs within reach post session  Notified RN of patient's pain  Elevated LLE on pillow in bed  Pt  given cues to avoid excess forward flexion during ambulation and to stay within the RW  Will continue to follow during harry stay to maximize functional mobility  Barriers to Discharge Decreased caregiver support   Goals   Patient Goals None reported   STG Expiration Date 07/19/22   PT Treatment Day 1   Plan   Treatment/Interventions Functional transfer training;LE strengthening/ROM; Therapeutic exercise;Patient/family training;Equipment eval/education;Gait training;Bed mobility;Spoke to nursing   Progress Progressing toward goals   PT Frequency 2-3x/wk   Recommendation   PT Discharge Recommendation Home with home health rehabilitation   [address] [place] [address] Mobility Inpatient   Turning in Bed Without Bedrails 4   Lying on Back to Sitting on Edge of Flat Bed 4   Moving Bed to Chair 4   Standing Up From Chair 4   Walk in Room 4   Climb 3-5 Stairs 3   Basic Mobility Inpatient Raw Score 23   Basic Mobility Standardized Score 50 88   Highest Level Of Mobility   -HLM Goal 7: Walk 25 feet or more   JH-HLM Achieved 7: Walk 25 feet or more         Catrachito Cueva PTA    An AM-PAC basic mobility standardized score less than 42 9 suggest the patient may benefit from discharge to post-acute rehab services

## 2022-07-15 NOTE — PLAN OF CARE
Problem: Potential for Falls  Goal: Patient will remain free of falls  Description: INTERVENTIONS:  - Educate patient/family on patient safety including physical limitations  - Instruct patient to call for assistance with activity   - Consult OT/PT to assist with strengthening/mobility   - Keep Call bell within reach  - Keep bed low and locked with side rails adjusted as appropriate  - Keep care items and personal belongings within reach  - Initiate and maintain comfort rounds  - Make Fall Risk Sign visible to staff  - Offer Toileting every 2  Problem: Prexisting or High Potential for Compromised Skin Integrity  Goal: Skin integrity is maintained or improved  Description: INTERVENTIONS:  - Identify patients at risk for skin breakdown  - Assess and monitor skin integrity  - Assess and monitor nutrition and hydration status  - Monitor labs   - Assess for incontinence   - Turn and reposition patient  - Assist with mobility/ambulation  - Relieve pressure over bony prominences  - Avoid friction and shearing  - Provide appropriate hygiene as needed including keeping skin clean and dry  - Evaluate need for skin moisturizer/barrier cream  - Collaborate with interdisciplinary team   - Patient/family teaching  - Consider wound care consult   7/15/2022 1447 by Zaida Lyons RN  Outcome: Progressing  7/15/2022 1446 by Zaida Lyons RN  Outcome: Progressing     Problem: MOBILITY - ADULT  Goal: Maintain or return to baseline ADL function  Description: INTERVENTIONS:  -  Assess patient's ability to carry out ADLs; assess patient's baseline for ADL function and identify physical deficits which impact ability to perform ADLs (bathing, care of mouth/teeth, toileting, grooming, dressing, etc )  - Assess/evaluate cause of self-care deficits   - Assess range of motion  - Assess patient's mobility; develop plan if impaired  - Assess patient's need for assistive devices and provide as appropriate  - Encourage maximum independence but intervene and supervise when necessary  - Involve family in performance of ADLs  - Assess for home care needs following discharge   - Consider OT consult to assist with ADL evaluation and planning for discharge  - Provide patient education as appropriate  7/15/2022 1447 by Damien Franz RN  Outcome: Progressing  7/15/2022 1446 by Damien Franz RN  Outcome: Progressing  Goal: Maintains/Returns to pre admission functional level  Description: INTERVENTIONS:  - Perform BMAT or MOVE assessment daily    - Set and communicate daily mobility goal to care team and patient/family/caregiver  - Collaborate with rehabilitation services on mobility goals if consulted  - Perform Range of Motion 4 times a day  - Reposition patient every 2 hours    - Dangle patient 3 times a day  - Stand patient 3 times a day  - Ambulate patient 3 times a day  - Out of bed to chair 3 times a day   - Out of bed for meals 3  Problem: INFECTION - ADULT  Goal: Absence or prevention of progression during hospitalization  Description: INTERVENTIONS:  - Assess and monitor for signs and symptoms of infection  - Monitor lab/diagnostic results  - Monitor all insertion sites, i e  indwelling lines, tubes, and drains  - Monitor endotracheal if appropriate and nasal secretions for changes in amount and color  - [place] appropriate cooling/warming therapies per order  - Administer medications as ordered  - Instruct and encourage patient and family to use good hand hygiene technique  - Identify and instruct in appropriate isolation precautions for identified infection/condition  Outcome: Progressing     Problem: DISCHARGE PLANNING  Goal: Discharge to home or other facility with appropriate resources  Description: INTERVENTIONS:  - Identify barriers to discharge w/patient and caregiver  - Arrange for needed discharge resources and transportation as appropriate  - Identify discharge learning needs (meds, wound care, etc )  - Arrange for interpretive services to assist at discharge as needed  - Refer to Case Management Department for coordinating discharge planning if the patient needs post-hospital services based on physician/advanced practitioner order or complex needs related to functional status, cognitive ability, or social support system  Outcome: Progressing     Problem: SKIN/TISSUE INTEGRITY - ADULT  Goal: Incision(s), wounds(s) or drain site(s) healing without S/S of infection  Description: INTERVENTIONS  - Assess and document dressing, incision, wound bed, drain sites and surrounding tissue  - Provide patient and family education  - Perform skin care/dressing changes every day  Outcome: Progressing    times a day  - Out of bed for toileting  - Record patient progress and toleration of activity level   7/15/2022 1447 by Kyleigh White RN  Outcome: Progressing  7/15/2022 1446 by Kyleigh White RN  Outcome: Progressing    Hours, in advance of need  - Initiate/Maintain bed alarm  - Obtain necessary fall risk management equipment  - Apply yellow socks and bracelet for high fall risk patients  - Consider moving patient to room near nurses station  7/15/2022 1447 by Kyleigh White RN  Outcome: Progressing  7/15/2022 1446 by Kyleigh White, RN  Outcome: Progressing

## 2022-07-15 NOTE — CONSULTS
Vancomycin IV Pharmacy-to-Dose Consultation    Iman Chisholm is a 82 y o  male who was receiving Vancomycin IV with management by the Pharmacy Consult service for treatment of skin-soft tissue infection  The patients Vancomycin therapy has been discontinued  Thank you for allowing us to take part in this patient's care  Pharmacy will sign-off now; please call or re-consult if there are any questions      Crystal Gonzales PharmD

## 2022-07-15 NOTE — ASSESSMENT & PLAN NOTE
· Presented with left leg cellulitis, surrounding wounds, and growth on leg  · Maggots were found in the wound upon presentation to the ED  · Ct lower extremity: "Extensive subcutaneous edema likely representing cellulitis  Although evaluation for abscess is limited in the absence images contrast no obvious discrete collection is identified    Dermal soft tissue lesion seen in association with the posterior medial calf "  · Currently on IV Zosyn d7  · Will transition to oral antibiotics on discharge  · Blood cultures negative thus far

## 2022-07-15 NOTE — PHYSICAL THERAPY NOTE
Physical Therapy Treatment Note     07/15/22 1038   PT Last Visit   PT Visit Date 07/15/22   Note Type   Note Type Treatment   Pain Assessment   Pain Assessment Tool 0-10   Pain Score 7   Pain Location/Orientation Orientation: Left; Location: Leg   Restrictions/Precautions   Weight Bearing Precautions Per Order No   Other Precautions Fall Risk;Pain;Bed Alarm; Chair Alarm   General   Chart Reviewed Yes   Family/Caregiver Present No   Subjective   Subjective pt  agreeable to PT  Seated on chair upon entry   Bed Mobility   Sit to Supine 5  Supervision   Additional items Assist x 1;HOB elevated; Bedrails; Increased time required   Transfers   Sit to Stand 5  Supervision   Additional items Armrests; Increased time required;Assist x 1   Stand to Sit 5  Supervision   Additional items Assist x 1; Armrests; Increased time required   Stand pivot 5  Supervision   Additional items Increased time required   Toilet transfer 5  Supervision   Additional items Increased time required;Standard toilet   Ambulation/Elevation   Gait pattern Improper Weight shift;Decreased foot clearance; Foward flexed; Short stride; Excessively slow;Decreased heel strike;Decreased toe off   Gait Assistance 5  Supervision   Additional items Verbal cues   Assistive Device Rolling walker   Distance 200ft   Balance   Static Sitting Good   Dynamic Sitting Fair +   Static Standing Fair   Dynamic Standing Fair -   Endurance Deficit   Endurance Deficit Yes   Endurance Deficit Description pain   Activity Tolerance   Activity Tolerance Patient tolerated treatment well;Patient limited by pain   Nurse Made Aware yes   Exercises   TKR Sitting;Bilateral;AROM;20 reps  (laq and marches)   Assessment   Prognosis Good   Problem List Decreased strength;Decreased range of motion;Decreased endurance;Decreased mobility;Pain;Decreased skin integrity; Decreased cognition   Assessment Pt  progessing well with mobility  pt  able to ambulate longer distance this session   pt  limited due to pain in his LLE  Pt  performed toielting and all transfers with S  Pt  performed sit to supine at the end of session with S  Bed alarm enagged and all needs within reach post session  Notified RN of patient's pain  Elevated LLE on pillow in bed  Pt  given cues to avoid excess forward flexion during ambulation and to stay within the RW  Will continue to follow during harry stay to maximize functional mobility  Barriers to Discharge Decreased caregiver support   Goals   Patient Goals None reported   STG Expiration Date 07/19/22   PT Treatment Day 1   Plan   Treatment/Interventions Functional transfer training;LE strengthening/ROM; Therapeutic exercise;Patient/family training;Equipment eval/education;Gait training;Bed mobility;Spoke to nursing   Progress Progressing toward goals   PT Frequency 2-3x/wk   Recommendation   PT Discharge Recommendation Home with home health rehabilitation   [address] [place] [address] Mobility Inpatient   Turning in Bed Without Bedrails 4   Lying on Back to Sitting on Edge of Flat Bed 4   Moving Bed to Chair 4   Standing Up From Chair 4   Walk in Room 4   Climb 3-5 Stairs 3   Basic Mobility Inpatient Raw Score 23   Basic Mobility Standardized Score 50 88   Highest Level Of Mobility   -HLM Goal 7: Walk 25 feet or more   JH-HLM Achieved 7: Walk 25 feet or more         Catrachito Cueva PTA    An AM-PAC basic mobility standardized score less than 42 9 suggest the patient may benefit from discharge to post-acute rehab services

## 2022-07-15 NOTE — PROGRESS NOTES
[facility]  Progress Note - Hosea Scheuermann 1935, 82 y o  male MRN: [id number]  Unit/Bed#: E2 -02 Encounter: [id number]  Primary Care Provider: No primary care provider on file  Date and time admitted to hospital: 7/9/2022 11:02 AM    * Cellulitis  Assessment & Plan  · Presented with left leg cellulitis, surrounding wounds, and growth on leg  · Maggots were found in the wound upon presentation to the ED  · Ct lower extremity: "Extensive subcutaneous edema likely representing cellulitis  Although evaluation for abscess is limited in the absence images contrast no obvious discrete collection is identified    Dermal soft tissue lesion seen in association with the posterior medial calf "  · Currently on IV Zosyn d7  · Will transition to oral antibiotics on discharge  · Blood cultures negative thus far    Encounter for competency evaluation  Assessment & Plan  · Neuro psychiatry consultation appreciated, at this point patient does not have capacity to make informed medical decisions    Hypertension  Assessment & Plan  · Continue amlodipine    Type 2 diabetes mellitus with hyperglycemia, without long-term current use of insulin ([facility])  Assessment & Plan  · New onset diabetes mellitus  · Hemoglobin A1c checked and is 8 8  · Placed on insulin sliding scale here  · Goal for better control of sugars in the setting of acute infection  · Monitor sugars and consider long-acting insulin before bed    Leg mass  Assessment & Plan  · Seen and evaluated by General surgery  · Appears mass has been there for quite some time  · Status post bedside incisional biopsy 07/13/2022  · Pain control    AKUA (acute kidney injury) [facility])  Assessment & Plan  Results from last 7 days   Lab Units 07/14/22  0430 07/13/22  0432 07/12/22  0613 07/11/22  0551   CREATININE mg/dL 0 99 1 03 1 03 1 00   · 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  · Baseline creatinine around 0 9  · Renal ultrasound with normal appearing kidneys and bladder  · Presenting creatinine 1 56, improved  · Monitor renal function      VTE Pharmacologic Prophylaxis:   Pharmacologic:  Heparin    Patient Centered Rounds: I have performed bedside rounds with nursing staff today  Time Spent for Care: 20 minutes  More than 50% of total time spent on counseling and coordination of care as described above  Current Length of Stay: 6 day(s)    Current Patient Status: Inpatient   Certification Statement: The patient will continue to require additional inpatient hospital stay due to Awaiting placement    Discharge Plan / Estimated Discharge Date: TBD    Code Status: Level 1 - Full Code      Subjective:   Patient seen and examined at bedside, comfortable, denies any abdominal pain, nausea, vomiting    Objective:     Vitals:   Temp (24hrs), Av 7 °F (36 5 °C), Min:97 6 °F (36 4 °C), Max:97 8 °F (36 6 °C)    Temp:  [97 6 °F (36 4 °C)-97 8 °F (36 6 °C)] 97 7 °F (36 5 °C)  HR:  [74-82] 79  Resp:  [16-18] 18  BP: (143-167)/(68-77) 146/68  SpO2:  [95 %-96 %] 96 %  Body mass index is 29 79 kg/m²  Input and Output Summary (last 24 hours): Intake/Output Summary (Last 24 hours) at 7/15/2022 1722  Last data filed at 7/15/2022 1553  Gross per 24 hour   Intake 60 ml   Output 325 ml   Net -265 ml       Physical Exam:    Constitutional: Patient is in no acute distress  HEENT:  Normocephalic, atraumatic  Cardiovascular: Normal S1S2, RRR, No murmurs/rubs/gallops appreciated  Pulmonary:  Bilateral air entry, No rhonchi/rales/wheezing appreciated  Abdominal: Soft, Bowel sounds present, Non-tender, Non-distended  Extremities:  No cyanosis, clubbing or edema  Neurological: Cranial nerves II-XII grossly intact, sensation intact, otherwise no focal neurological symptoms     Skin:  Left lower extremity with dressing in place    Additional Data:     Labs:    Results from last 7 days   Lab Units 22  0430   WBC Thousand/uL 10 03   HEMOGLOBIN g/dL 16 3   HEMATOCRIT % 50 2* PLATELETS Thousands/uL 298   NEUTROS PCT % 51   LYMPHS PCT % 33   MONOS PCT % 8   EOS PCT % 5     Results from last 7 days   Lab Units 07/14/22  0430 07/10/22  0517 07/09/22  1159   POTASSIUM mmol/L 3 5   < > 3 7   CHLORIDE mmol/L 105   < > 100   CO2 mmol/L 28   < > 30   BUN mg/dL 9   < > 17   CREATININE mg/dL 0 99   < > 1 56*   CALCIUM mg/dL 8 5   < > 9 0   ALK PHOS U/L  --   --  79   ALT U/L  --   --  22   AST U/L  --   --  19    < > = values in this interval not displayed  Results from last 7 days   Lab Units 07/09/22  1159   INR  1 04        I Have Reviewed All Lab Data Listed Above  Recent Cultures (last 7 days):     Results from last 7 days   Lab Units 07/09/22  1159   BLOOD CULTURE  No Growth After 5 Days  No Growth After 5 Days         Last 24 Hours Medication List:   Current Facility-Administered Medications   Medication Dose Route Frequency Provider Last Rate    acetaminophen  650 mg Oral Q6H PRN Doyle Collet, DO      amLODIPine  10 mg Oral Daily Palak Rausch MD      aspirin  81 mg Oral Daily Palak Rausch MD      heparin (porcine)  5,000 Units Subcutaneous [facility] Vanessa Ambron, DO      HYDROmorphone  0 2 mg Intravenous Q4H PRN Vanessa Ambron, DO      insulin lispro  1-5 Units Subcutaneous TID AC Vanessa Ambron, DO      insulin lispro  1-5 Units Subcutaneous HS Vanessa Ambron, DO      ondansetron  4 mg Intravenous Q6H PRN Vanessa Ambron, DO      oxyCODONE  2 5 mg Oral Q8H PRN Vanessa Ambron, DO      piperacillin-tazobactam  3 375 g Intravenous Q8H Vanessa Ambron, DO 3 375 g (07/15/22 1550)        Today, Patient Was Seen By: Palak Rausch MD

## 2022-07-15 NOTE — ASSESSMENT & PLAN NOTE
· Neuro psychiatry consultation appreciated, at this point patient does not have capacity to make informed medical decisions

## 2022-07-16 LAB
GLUCOSE SERPL-MCNC: 129 MG/DL (ref 65–140)
GLUCOSE SERPL-MCNC: 130 MG/DL (ref 65–140)
GLUCOSE SERPL-MCNC: 130 MG/DL (ref 65–140)
GLUCOSE SERPL-MCNC: 176 MG/DL (ref 65–140)

## 2022-07-16 PROCEDURE — 99232 SBSQ HOSP IP/OBS MODERATE 35: CPT | Performed by: INTERNAL MEDICINE

## 2022-07-16 PROCEDURE — 82948 REAGENT STRIP/BLOOD GLUCOSE: CPT

## 2022-07-16 RX ADMIN — HEPARIN SODIUM 5000 UNITS: 5000 INJECTION INTRAVENOUS; SUBCUTANEOUS at 21:24

## 2022-07-16 RX ADMIN — HEPARIN SODIUM 5000 UNITS: 5000 INJECTION INTRAVENOUS; SUBCUTANEOUS at 13:44

## 2022-07-16 RX ADMIN — OXYCODONE 2.5 MG: 5 TABLET ORAL at 11:34

## 2022-07-16 RX ADMIN — INSULIN LISPRO 1 UNITS: 100 INJECTION, SOLUTION INTRAVENOUS; SUBCUTANEOUS at 21:24

## 2022-07-16 RX ADMIN — OXYCODONE 2.5 MG: 5 TABLET ORAL at 01:57

## 2022-07-16 RX ADMIN — OXYCODONE 2.5 MG: 5 TABLET ORAL at 17:58

## 2022-07-16 RX ADMIN — HYDROMORPHONE HYDROCHLORIDE 0.2 MG: 0.2 INJECTION, SOLUTION INTRAMUSCULAR; INTRAVENOUS; SUBCUTANEOUS at 19:39

## 2022-07-16 RX ADMIN — PIPERACILLIN AND TAZOBACTAM 3.38 G: 3; .375 INJECTION, POWDER, LYOPHILIZED, FOR SOLUTION INTRAVENOUS at 06:21

## 2022-07-16 RX ADMIN — ASPIRIN 81 MG: 81 TABLET, COATED ORAL at 09:21

## 2022-07-16 RX ADMIN — HEPARIN SODIUM 5000 UNITS: 5000 INJECTION INTRAVENOUS; SUBCUTANEOUS at 06:22

## 2022-07-16 RX ADMIN — AMLODIPINE BESYLATE 10 MG: 10 TABLET ORAL at 09:21

## 2022-07-16 NOTE — ASSESSMENT & PLAN NOTE
· Presented with left leg cellulitis, surrounding wounds, and growth on leg  · Maggots were found in the wound upon presentation to the ED  · Ct lower extremity: "Extensive subcutaneous edema likely representing cellulitis  Although evaluation for abscess is limited in the absence images contrast no obvious discrete collection is identified    Dermal soft tissue lesion seen in association with the posterior medial calf "  · Completed 7 days of IV antibiotics  · Blood cultures negative thus far

## 2022-07-16 NOTE — PLAN OF CARE
Problem: Potential for Falls  Goal: Patient will remain free of falls  Description: INTERVENTIONS:  - Educate patient/family on patient safety including physical limitations  - Instruct patient to call for assistance with activity   - Consult OT/PT to assist with strengthening/mobility   - Keep Call bell within reach  - Keep bed low and locked with side rails adjusted as appropriate  - Keep care items and personal belongings within reach  - Initiate and maintain comfort rounds  - Make Fall Risk Sign visible to staff  - Offer Toileting every 2 Hours, in advance of need  - Initiate/Maintain bed/chair alarm  - Obtain necessary fall risk management equipment:bed/chair alarm, call bell, walker, gripper socks, rounds  - Apply yellow socks and bracelet for high fall risk patients  - Consider moving patient to room near nurses station  Outcome: Progressing     Problem: Prexisting or High Potential for Compromised Skin Integrity  Goal: Skin integrity is maintained or improved  Description: INTERVENTIONS:  - Identify patients at risk for skin breakdown  - Assess and monitor skin integrity  - Assess and monitor nutrition and hydration status  - Monitor labs   - Assess for incontinence   - Instruct patient to turn and repostion every 2 hours while in bed and weight shift every 15 minutes while in chair  - Assist with mobility/ambulation  - Relieve pressure over bony prominences  - Avoid friction and shearing  - Provide appropriate hygiene as needed including keeping skin clean and dry  - Evaluate need for skin moisturizer/barrier cream  - Collaborate with interdisciplinary team   - Patient/family teaching  - Consider wound care consult   Outcome: Progressing     Problem: MOBILITY - ADULT  Goal: Maintain or return to baseline ADL function  Description: INTERVENTIONS:  -  Assess patient's ability to carry out ADLs; assess patient's baseline for ADL function and identify physical deficits which impact ability to perform ADLs (bathing, care of mouth/teeth, toileting, grooming, dressing, etc )  - Assess/evaluate cause of self-care deficits   - Assess range of motion  - Assess patient's mobility; develop plan if impaired  - Assess patient's need for assistive devices and provide as appropriate  - Encourage maximum independence but intervene and supervise when necessary  - Involve family in performance of ADLs  - Assess for home care needs following discharge   - Consider OT consult to assist with ADL evaluation and planning for discharge  - Provide patient education as appropriate  Outcome: Progressing  Goal: Maintains/Returns to pre admission functional level  Description: INTERVENTIONS:  - Perform BMAT or MOVE assessment daily    - Set and communicate daily mobility goal to care team and patient/family/caregiver  - Collaborate with rehabilitation services on mobility goals if consulted  - Perform Range of Motion 2 times a day  - Reposition patient every 2 hours    - Dangle patient 2 times a day  - Stand patient 2 times a day  - Ambulate patient 2 times a day  - Out of bed to chair 2 times a day   - Out of bed for meals 2 times a day  - Out of bed for toileting  - Record patient progress and toleration of activity level   Outcome: Progressing     Problem: INFECTION - ADULT  Goal: Absence or prevention of progression during hospitalization  Description: INTERVENTIONS:  - Assess and monitor for signs and symptoms of infection  - Monitor lab/diagnostic results  - Monitor all insertion sites, i e  indwelling lines, tubes, and drains  - Monitor endotracheal if appropriate and nasal secretions for changes in amount and color  - [place] appropriate cooling/warming therapies per order  - Administer medications as ordered  - Instruct and encourage patient and family to use good hand hygiene technique  - Identify and instruct in appropriate isolation precautions for identified infection/condition  Outcome: Progressing     Problem: DISCHARGE PLANNING  Goal: Discharge to home or other facility with appropriate resources  Description: INTERVENTIONS:  - Identify barriers to discharge w/patient and caregiver  - Arrange for needed discharge resources and transportation as appropriate  - Identify discharge learning needs (meds, wound care, etc )  - Arrange for interpretive services to assist at discharge as needed  - Refer to Case Management Department for coordinating discharge planning if the patient needs post-hospital services based on physician/advanced practitioner order or complex needs related to functional status, cognitive ability, or social support system  Outcome: Progressing     Problem: PAIN - ADULT  Goal: Verbalizes/displays adequate comfort level or baseline comfort level  Description: Interventions:  - Encourage patient to monitor pain and request assistance  - Assess pain using appropriate pain scale  - Administer analgesics based on type and severity of pain and evaluate response  - Implement non-pharmacological measures as appropriate and evaluate response  - Consider cultural and social influences on pain and pain management  - Notify physician/advanced practitioner if interventions unsuccessful or patient reports new pain  Outcome: Progressing     Problem: SKIN/TISSUE INTEGRITY - ADULT  Goal: Incision(s), wounds(s) or drain site(s) healing without S/S of infection  Description: INTERVENTIONS  - Assess and document dressing, incision, wound bed, drain sites and surrounding tissue  - Provide patient and family education  - Perform skin care/dressing changes  Outcome: Progressing     Problem: CONFUSION/THOUGHT DISTURBANCE  Goal: Thought disturbances (confusion, delirium, depression, dementia or psychosis) are managed to maintain or return to baseline mental status and functional level  Description: INTERVENTIONS:  - Assess for possible contributors to  thought disturbance, including but not limited to medications, infection, impaired vision or hearing, underlying metabolic abnormalities, dehydration, respiratory compromise,  psychiatric diagnoses and notify attending PHYSICAN/AP  - Monitor and intervene to maintain adequate nutrition, hydration, elimination, sleep and activity  - Decrease environmental stimuli, including noise as appropriate  - Provide frequent contacts to provide refocusing, direction and reassurance as needed  Approach patient calmly with eye contact and at their level    - [place] high risk fall precautions, aspiration precautions and other safety measures, as indicated  - If delirium suspected, notify physician/AP of change in condition and request immediate in-person evaluation  - Pursue consults as appropriate including Geriatric (campus dependent), OT for cognitive evaluation/activity planning, psychiatric, pastoral care, etc   Outcome: Progressing

## 2022-07-16 NOTE — PROGRESS NOTES
[address] [facility]  Progress Note - Mayur Luciano 1935, 82 y o  male MRN: [id number]  Unit/Bed#: E2 -02 Encounter: [id number]  Primary Care Provider: No primary care provider on file  Date and time admitted to hospital: 7/9/2022 11:02 AM    * Cellulitis  Assessment & Plan  · Presented with left leg cellulitis, surrounding wounds, and growth on leg  · Maggots were found in the wound upon presentation to the ED  · Ct lower extremity: "Extensive subcutaneous edema likely representing cellulitis  Although evaluation for abscess is limited in the absence images contrast no obvious discrete collection is identified    Dermal soft tissue lesion seen in association with the posterior medial calf "  · Completed 7 days of IV antibiotics  · Blood cultures negative thus far    Encounter for competency evaluation  Assessment & Plan  · Neuro psychiatry consultation appreciated, at this point patient does not have capacity to make informed medical decisions    Hypertension  Assessment & Plan  · Continue amlodipine    Type 2 diabetes mellitus with hyperglycemia, without long-term current use of insulin ([facility])  Assessment & Plan  · New onset diabetes mellitus  · Hemoglobin A1c checked and is 8 8  · Placed on insulin sliding scale here  · Goal for better control of sugars in the setting of acute infection  · Monitor sugars and consider long-acting insulin before bed    Leg mass  Assessment & Plan  · Seen and evaluated by General surgery  · Appears mass has been there for quite some time  · Status post bedside incisional biopsy 07/13/2022  · Pain control    AKUA (acute kidney injury) [facility])  Assessment & Plan  Results from last 7 days   Lab Units 07/14/22  0430 07/13/22  0432 07/12/22  0613 07/11/22  0551   CREATININE mg/dL 0 99 1 03 1 03 1 00   · 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  · Baseline creatinine around 0 9  · Renal ultrasound with normal appearing kidneys and bladder  · Presenting creatinine 1 56, improved  · Monitor renal function      VTE Pharmacologic Prophylaxis:   Pharmacologic: heparin    Patient Centered Rounds: I have performed bedside rounds with nursing staff today  Education and Discussions with Family / Patient: patient    Time Spent for Care: 20 minutes  More than 50% of total time spent on counseling and coordination of care as described above  Current Length of Stay: 7 day(s)    Current Patient Status: Inpatient   Certification Statement: The patient will continue to require additional inpatient hospital stay due to Pending placement    Discharge Plan / Estimated Discharge Date: TBD    Code Status: Level 1 - Full Code      Subjective:   Patient seen and examined at bedside, resting comfortably, denies any complaints    Objective:     Vitals:   Temp (24hrs), Av 4 °F (36 3 °C), Min:97 3 °F (36 3 °C), Max:97 7 °F (36 5 °C)    Temp:  [97 3 °F (36 3 °C)-97 7 °F (36 5 °C)] 97 3 °F (36 3 °C)  HR:  [76-79] 78  Resp:  [18] 18  BP: (137-163)/(64-77) 163/77  SpO2:  [95 %-97 %] 97 %  Body mass index is 29 79 kg/m²  Input and Output Summary (last 24 hours): Intake/Output Summary (Last 24 hours) at 2022 1001  Last data filed at 2022 0840  Gross per 24 hour   Intake 480 ml   Output 400 ml   Net 80 ml       Physical Exam:    Constitutional: Patient is oriented to person, place and time, no acute distress  HEENT:  Normocephalic, atraumatic  Cardiovascular: Normal S1S2, RRR, No murmurs/rubs/gallops appreciated  Pulmonary:  Bilateral air entry, No rhonchi/rales/wheezing appreciated  Abdominal: Soft, Bowel sounds present, Non-tender, Non-distended  Extremities:  No cyanosis, clubbing or edema  Neurological: Cranial nerves II-XII grossly intact, sensation intact, otherwise no focal neurological symptoms     Skin:  Left lower extremity with dressing in place    Additional Data:     Labs:    Results from last 7 days   Lab Units 22  0430   WBC Thousand/uL 10 03   HEMOGLOBIN g/dL 16 3   HEMATOCRIT % 50 2*   PLATELETS Thousands/uL 298   NEUTROS PCT % 51   LYMPHS PCT % 33   MONOS PCT % 8   EOS PCT % 5     Results from last 7 days   Lab Units 07/14/22  0430 07/10/22  0517 07/09/22  1159   POTASSIUM mmol/L 3 5   < > 3 7   CHLORIDE mmol/L 105   < > 100   CO2 mmol/L 28   < > 30   BUN mg/dL 9   < > 17   CREATININE mg/dL 0 99   < > 1 56*   CALCIUM mg/dL 8 5   < > 9 0   ALK PHOS U/L  --   --  79   ALT U/L  --   --  22   AST U/L  --   --  19    < > = values in this interval not displayed  Results from last 7 days   Lab Units 07/09/22  1159   INR  1 04        I Have Reviewed All Lab Data Listed Above  Recent Cultures (last 7 days):     Results from last 7 days   Lab Units 07/09/22  1159   BLOOD CULTURE  No Growth After 5 Days  No Growth After 5 Days         Last 24 Hours Medication List:   Current Facility-Administered Medications   Medication Dose Route Frequency Provider Last Rate    acetaminophen  650 mg Oral Q6H PRN Lorenzo Dew, DO      amLODIPine  10 mg Oral Daily Tam Waterman MD      aspirin  81 mg Oral Daily Tam Waterman MD      heparin (porcine)  5,000 Units Subcutaneous [facility] Vanessa Ambron, DO      HYDROmorphone  0 2 mg Intravenous Q4H PRN Vanessa Ambron, DO      insulin lispro  1-5 Units Subcutaneous TID AC Vanessa Ambron, DO      insulin lispro  1-5 Units Subcutaneous HS Vanessa Ambron, DO      ondansetron  4 mg Intravenous Q6H PRN Vanessa Ambron, DO      oxyCODONE  2 5 mg Oral Q8H PRN Lorenzo Dew, DO          Today, Patient Was Seen By: Tam Waterman MD

## 2022-07-17 LAB
GLUCOSE SERPL-MCNC: 114 MG/DL (ref 65–140)
GLUCOSE SERPL-MCNC: 118 MG/DL (ref 65–140)
GLUCOSE SERPL-MCNC: 133 MG/DL (ref 65–140)
GLUCOSE SERPL-MCNC: 143 MG/DL (ref 65–140)

## 2022-07-17 PROCEDURE — 82948 REAGENT STRIP/BLOOD GLUCOSE: CPT

## 2022-07-17 PROCEDURE — 99232 SBSQ HOSP IP/OBS MODERATE 35: CPT | Performed by: INTERNAL MEDICINE

## 2022-07-17 RX ADMIN — HEPARIN SODIUM 5000 UNITS: 5000 INJECTION INTRAVENOUS; SUBCUTANEOUS at 14:00

## 2022-07-17 RX ADMIN — HEPARIN SODIUM 5000 UNITS: 5000 INJECTION INTRAVENOUS; SUBCUTANEOUS at 22:15

## 2022-07-17 RX ADMIN — OXYCODONE 2.5 MG: 5 TABLET ORAL at 18:19

## 2022-07-17 RX ADMIN — AMLODIPINE BESYLATE 10 MG: 10 TABLET ORAL at 08:36

## 2022-07-17 RX ADMIN — OXYCODONE 2.5 MG: 5 TABLET ORAL at 08:36

## 2022-07-17 RX ADMIN — HEPARIN SODIUM 5000 UNITS: 5000 INJECTION INTRAVENOUS; SUBCUTANEOUS at 06:43

## 2022-07-17 RX ADMIN — ASPIRIN 81 MG: 81 TABLET, COATED ORAL at 08:35

## 2022-07-17 NOTE — PROGRESS NOTES
[address] [facility]  Progress Note - Marimar Spring 1935, 82 y o  male MRN: [id number]  Unit/Bed#: E2 -02 Encounter: [id number]  Primary Care Provider: No primary care provider on file  Date and time admitted to hospital: 7/9/2022 11:02 AM    * Cellulitis  Assessment & Plan  · Presented with left leg cellulitis, surrounding wounds, and growth on leg  · Maggots were found in the wound upon presentation to the ED  · Ct lower extremity: "Extensive subcutaneous edema likely representing cellulitis  Although evaluation for abscess is limited in the absence images contrast no obvious discrete collection is identified    Dermal soft tissue lesion seen in association with the posterior medial calf "  · Completed 7 days of IV antibiotics  · Blood cultures negative thus far    Encounter for competency evaluation  Assessment & Plan  · Neuro psychiatry consultation appreciated, at this point patient does not have capacity to make informed medical decisions    Hypertension  Assessment & Plan  · Continue amlodipine    Type 2 diabetes mellitus with hyperglycemia, without long-term current use of insulin ([facility])  Assessment & Plan  · New onset diabetes mellitus  · Hemoglobin A1c checked and is 8 8  · Placed on insulin sliding scale here  · Goal for better control of sugars in the setting of acute infection  · Monitor sugars and consider long-acting insulin before bed    Leg mass  Assessment & Plan  · Seen and evaluated by General surgery  · Appears mass has been there for quite some time  · Status post bedside incisional biopsy 07/13/2022  · Pain control    AKUA (acute kidney injury) [facility])  Assessment & Plan  Results from last 7 days   Lab Units 07/14/22  0430 07/13/22  0432 07/12/22  0613 07/11/22  0551   CREATININE mg/dL 0 99 1 03 1 03 1 00   · 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  · Baseline creatinine around 0 9  · Renal ultrasound with normal appearing kidneys and bladder  · Presenting creatinine 1 56, improved  · Monitor renal function        VTE Pharmacologic Prophylaxis:   Pharmacologic:  Heparin    Patient Centered Rounds: I have performed bedside rounds with nursing staff today  Education and Discussions with Family / Patient: Updated daughter Melanie Rodrigues    Time Spent for Care: 20 minutes  More than 50% of total time spent on counseling and coordination of care as described above  Current Length of Stay: 8 day(s)    Current Patient Status: Inpatient   Certification Statement: The patient will continue to require additional inpatient hospital stay due to Pending placement    Discharge Plan / Estimated Discharge Date: TBD    Code Status: Level 1 - Full Code      Subjective:   Patient seen and examined at bedside, sitting upright in chair next to bed, denies any chest pain, abdominal pain, nausea vomiting    Objective:     Vitals:   Temp (24hrs), Av 9 °F (36 6 °C), Min:97 6 °F (36 4 °C), Max:98 3 °F (36 8 °C)    Temp:  [97 6 °F (36 4 °C)-98 3 °F (36 8 °C)] 97 7 °F (36 5 °C)  HR:  [63-84] 84  Resp:  [18] 18  BP: (159-164)/(71-81) 160/71  SpO2:  [96 %-97 %] 96 %  Body mass index is 29 79 kg/m²  Input and Output Summary (last 24 hours): Intake/Output Summary (Last 24 hours) at 2022 0818  Last data filed at 2022 0630  Gross per 24 hour   Intake 420 ml   Output 700 ml   Net -280 ml       Physical Exam:    Constitutional: Patient is oriented to person, place and time, no acute distress  HEENT:  Normocephalic, atraumatic  Cardiovascular: Normal S1S2, RRR, No murmurs/rubs/gallops appreciated  Pulmonary:  Bilateral air entry, No rhonchi/rales/wheezing appreciated  Abdominal: Soft, Bowel sounds present, Non-tender, Non-distended  Extremities:  No cyanosis, clubbing or edema  Neurological: Cranial nerves II-XII grossly intact, sensation intact, otherwise no focal neurological symptoms     Skin:  Left lower extremity with dressing in place  Additional Data:     Labs:    Results from last 7 days Lab Units 07/14/22  0430   WBC Thousand/uL 10 03   HEMOGLOBIN g/dL 16 3   HEMATOCRIT % 50 2*   PLATELETS Thousands/uL 298   NEUTROS PCT % 51   LYMPHS PCT % 33   MONOS PCT % 8   EOS PCT % 5     Results from last 7 days   Lab Units 07/14/22  0430   POTASSIUM mmol/L 3 5   CHLORIDE mmol/L 105   CO2 mmol/L 28   BUN mg/dL 9   CREATININE mg/dL 0 99   CALCIUM mg/dL 8 5            I Have Reviewed All Lab Data Listed Above          Recent Cultures (last 7 days):           Last 24 Hours Medication List:   Current Facility-Administered Medications   Medication Dose Route Frequency Provider Last Rate    acetaminophen  650 mg Oral Q6H PRN Doyle Collet, DO      amLODIPine  10 mg Oral Daily Palak Rausch MD      aspirin  81 mg Oral Daily Palak Rausch MD      heparin (porcine)  5,000 Units Subcutaneous [facility] Vanessa Anayeliron, DO      HYDROmorphone  0 2 mg Intravenous Q4H PRN Vanessa Anayeliron, DO      insulin lispro  1-5 Units Subcutaneous TID AC Vanessa Anayeliron, DO      insulin lispro  1-5 Units Subcutaneous HS Vanessa Ambron, DO      ondansetron  4 mg Intravenous Q6H PRN Vanessa Anayeliron, DO      oxyCODONE  2 5 mg Oral Q8H PRN Doyle Collet, DO          Today, Patient Was Seen By: Palak Rausch MD

## 2022-07-17 NOTE — PLAN OF CARE
Problem: Potential for Falls  Goal: Patient will remain free of falls  Description: INTERVENTIONS:  - Educate patient/family on patient safety including physical limitations  - Instruct patient to call for assistance with activity   - Consult OT/PT to assist with strengthening/mobility   - Keep Call bell within reach  - Keep bed low and locked with side rails adjusted as appropriate  - Keep care items and personal belongings within reach  - Initiate and maintain comfort rounds  - Make Fall Risk Sign visible to staff  - Offer Toileting every 2 Hours, in advance of need  - Initiate/Maintain bed/chair alarm  - Obtain necessary fall risk management equipment:bed/chair alarm, call bell, walker, gripper socks, rounds  - Apply yellow socks and bracelet for high fall risk patients  - Consider moving patient to room near nurses station  Outcome: Progressing     Problem: Prexisting or High Potential for Compromised Skin Integrity  Goal: Skin integrity is maintained or improved  Description: INTERVENTIONS:  - Identify patients at risk for skin breakdown  - Assess and monitor skin integrity  - Assess and monitor nutrition and hydration status  - Monitor labs   - Assess for incontinence   - Instruct patient to turn and repostion every 2 hours while in bed and weight shift every 15 minutes while in chair  - Assist with mobility/ambulation  - Relieve pressure over bony prominences  - Avoid friction and shearing  - Provide appropriate hygiene as needed including keeping skin clean and dry  - Evaluate need for skin moisturizer/barrier cream  - Collaborate with interdisciplinary team   - Patient/family teaching  - Consider wound care consult   Outcome: Progressing     Problem: MOBILITY - ADULT  Goal: Maintain or return to baseline ADL function  Description: INTERVENTIONS:  -  Assess patient's ability to carry out ADLs; assess patient's baseline for ADL function and identify physical deficits which impact ability to perform ADLs (bathing, care of mouth/teeth, toileting, grooming, dressing, etc )  - Assess/evaluate cause of self-care deficits   - Assess range of motion  - Assess patient's mobility; develop plan if impaired  - Assess patient's need for assistive devices and provide as appropriate  - Encourage maximum independence but intervene and supervise when necessary  - Involve family in performance of ADLs  - Assess for home care needs following discharge   - Consider OT consult to assist with ADL evaluation and planning for discharge  - Provide patient education as appropriate  Outcome: Progressing  Goal: Maintains/Returns to pre admission functional level  Description: INTERVENTIONS:  - Perform BMAT or MOVE assessment daily    - Set and communicate daily mobility goal to care team and patient/family/caregiver  - Collaborate with rehabilitation services on mobility goals if consulted  - Perform Range of Motion 2 times a day  - Reposition patient every 2 hours    - Dangle patient 2 times a day  - Stand patient 2 times a day  - Ambulate patient 2 times a day  - Out of bed to chair 2 times a day   - Out of bed for meals 2 times a day  - Out of bed for toileting  - Record patient progress and toleration of activity level   Outcome: Progressing     Problem: INFECTION - ADULT  Goal: Absence or prevention of progression during hospitalization  Description: INTERVENTIONS:  - Assess and monitor for signs and symptoms of infection  - Monitor lab/diagnostic results  - Monitor all insertion sites, i e  indwelling lines, tubes, and drains  - Monitor endotracheal if appropriate and nasal secretions for changes in amount and color  - [place] appropriate cooling/warming therapies per order  - Administer medications as ordered  - Instruct and encourage patient and family to use good hand hygiene technique  - Identify and instruct in appropriate isolation precautions for identified infection/condition  Outcome: Progressing     Problem: DISCHARGE PLANNING  Goal: Discharge to home or other facility with appropriate resources  Description: INTERVENTIONS:  - Identify barriers to discharge w/patient and caregiver  - Arrange for needed discharge resources and transportation as appropriate  - Identify discharge learning needs (meds, wound care, etc )  - Arrange for interpretive services to assist at discharge as needed  - Refer to Case Management Department for coordinating discharge planning if the patient needs post-hospital services based on physician/advanced practitioner order or complex needs related to functional status, cognitive ability, or social support system  Outcome: Progressing     Problem: PAIN - ADULT  Goal: Verbalizes/displays adequate comfort level or baseline comfort level  Description: Interventions:  - Encourage patient to monitor pain and request assistance  - Assess pain using appropriate pain scale  - Administer analgesics based on type and severity of pain and evaluate response  - Implement non-pharmacological measures as appropriate and evaluate response  - Consider cultural and social influences on pain and pain management  - Notify physician/advanced practitioner if interventions unsuccessful or patient reports new pain  Outcome: Progressing     Problem: SKIN/TISSUE INTEGRITY - ADULT  Goal: Incision(s), wounds(s) or drain site(s) healing without S/S of infection  Description: INTERVENTIONS  - Assess and document dressing, incision, wound bed, drain sites and surrounding tissue  - Provide patient and family education  - Perform skin care/dressing changes  Outcome: Progressing     Problem: CONFUSION/THOUGHT DISTURBANCE  Goal: Thought disturbances (confusion, delirium, depression, dementia or psychosis) are managed to maintain or return to baseline mental status and functional level  Description: INTERVENTIONS:  - Assess for possible contributors to  thought disturbance, including but not limited to medications, infection, impaired vision or hearing, underlying metabolic abnormalities, dehydration, respiratory compromise,  psychiatric diagnoses and notify attending PHYSICAN/AP  - Monitor and intervene to maintain adequate nutrition, hydration, elimination, sleep and activity  - Decrease environmental stimuli, including noise as appropriate  - Provide frequent contacts to provide refocusing, direction and reassurance as needed  Approach patient calmly with eye contact and at their level    - [place] high risk fall precautions, aspiration precautions and other safety measures, as indicated  - If delirium suspected, notify physician/AP of change in condition and request immediate in-person evaluation  - Pursue consults as appropriate including Geriatric (campus dependent), OT for cognitive evaluation/activity planning, psychiatric, pastoral care, etc   Outcome: Progressing

## 2022-07-17 NOTE — PLAN OF CARE
Problem: Potential for Falls  Goal: Patient will remain free of falls  Description: INTERVENTIONS:  - Educate patient/family on patient safety including physical limitations  - Instruct patient to call for assistance with activity   - Consult OT/PT to assist with strengthening/mobility   - Keep Call bell within reach  - Keep bed low and locked with side rails adjusted as appropriate  - Keep care items and personal belongings within reach  - Initiate and maintain comfort rounds  - Make Fall Risk Sign visible to staff  - Offer Toileting every 2 Hours, in advance of need  - Initiate/Maintain bed/chair alarm  - Obtain necessary fall risk management equipment:bed/chair alarm, call bell, walker, gripper socks, rounds  - Apply yellow socks and bracelet for high fall risk patients  - Consider moving patient to room near nurses station  Outcome: Progressing     Problem: Prexisting or High Potential for Compromised Skin Integrity  Goal: Skin integrity is maintained or improved  Description: INTERVENTIONS:  - Identify patients at risk for skin breakdown  - Assess and monitor skin integrity  - Assess and monitor nutrition and hydration status  - Monitor labs   - Assess for incontinence   - Instruct patient to turn and repostion every 2 hours while in bed and weight shift every 15 minutes while in chair  - Assist with mobility/ambulation  - Relieve pressure over bony prominences  - Avoid friction and shearing  - Provide appropriate hygiene as needed including keeping skin clean and dry  - Evaluate need for skin moisturizer/barrier cream  - Collaborate with interdisciplinary team   - Patient/family teaching  - Consider wound care consult   Outcome: Progressing     Problem: MOBILITY - ADULT  Goal: Maintain or return to baseline ADL function  Description: INTERVENTIONS:  -  Assess patient's ability to carry out ADLs; assess patient's baseline for ADL function and identify physical deficits which impact ability to perform ADLs (bathing, care of mouth/teeth, toileting, grooming, dressing, etc )  - Assess/evaluate cause of self-care deficits   - Assess range of motion  - Assess patient's mobility; develop plan if impaired  - Assess patient's need for assistive devices and provide as appropriate  - Encourage maximum independence but intervene and supervise when necessary  - Involve family in performance of ADLs  - Assess for home care needs following discharge   - Consider OT consult to assist with ADL evaluation and planning for discharge  - Provide patient education as appropriate  Outcome: Progressing     Problem: INFECTION - ADULT  Goal: Absence or prevention of progression during hospitalization  Description: INTERVENTIONS:  - Assess and monitor for signs and symptoms of infection  - Monitor lab/diagnostic results  - Monitor all insertion sites, i e  indwelling lines, tubes, and drains  - Monitor endotracheal if appropriate and nasal secretions for changes in amount and color  - [place] appropriate cooling/warming therapies per order  - Administer medications as ordered  - Instruct and encourage patient and family to use good hand hygiene technique  - Identify and instruct in appropriate isolation precautions for identified infection/condition  Outcome: Progressing     Problem: DISCHARGE PLANNING  Goal: Discharge to home or other facility with appropriate resources  Description: INTERVENTIONS:  - Identify barriers to discharge w/patient and caregiver  - Arrange for needed discharge resources and transportation as appropriate  - Identify discharge learning needs (meds, wound care, etc )  - Arrange for interpretive services to assist at discharge as needed  - Refer to Case Management Department for coordinating discharge planning if the patient needs post-hospital services based on physician/advanced practitioner order or complex needs related to functional status, cognitive ability, or social support system  Outcome: Progressing Problem: SKIN/TISSUE INTEGRITY - ADULT  Goal: Incision(s), wounds(s) or drain site(s) healing without S/S of infection  Description: INTERVENTIONS  - Assess and document dressing, incision, wound bed, drain sites and surrounding tissue  - Provide patient and family education  - Perform skin care/dressing changes as ordered  Outcome: Progressing     Problem: PAIN - ADULT  Goal: Verbalizes/displays adequate comfort level or baseline comfort level  Description: Interventions:  - Encourage patient to monitor pain and request assistance  - Assess pain using appropriate pain scale  - Administer analgesics based on type and severity of pain and evaluate response  - Implement non-pharmacological measures as appropriate and evaluate response  - Consider cultural and social influences on pain and pain management  - Notify physician/advanced practitioner if interventions unsuccessful or patient reports new pain  Outcome: Progressing

## 2022-07-18 LAB
GLUCOSE SERPL-MCNC: 127 MG/DL (ref 65–140)
GLUCOSE SERPL-MCNC: 140 MG/DL (ref 65–140)
GLUCOSE SERPL-MCNC: 147 MG/DL (ref 65–140)
GLUCOSE SERPL-MCNC: 169 MG/DL (ref 65–140)

## 2022-07-18 PROCEDURE — 82948 REAGENT STRIP/BLOOD GLUCOSE: CPT

## 2022-07-18 PROCEDURE — 99232 SBSQ HOSP IP/OBS MODERATE 35: CPT | Performed by: INTERNAL MEDICINE

## 2022-07-18 RX ADMIN — HEPARIN SODIUM 5000 UNITS: 5000 INJECTION INTRAVENOUS; SUBCUTANEOUS at 22:29

## 2022-07-18 RX ADMIN — AMLODIPINE BESYLATE 10 MG: 10 TABLET ORAL at 08:06

## 2022-07-18 RX ADMIN — ACETAMINOPHEN 650 MG: 325 TABLET, FILM COATED ORAL at 14:21

## 2022-07-18 RX ADMIN — HEPARIN SODIUM 5000 UNITS: 5000 INJECTION INTRAVENOUS; SUBCUTANEOUS at 13:34

## 2022-07-18 RX ADMIN — OXYCODONE 2.5 MG: 5 TABLET ORAL at 20:40

## 2022-07-18 RX ADMIN — INSULIN LISPRO 1 UNITS: 100 INJECTION, SOLUTION INTRAVENOUS; SUBCUTANEOUS at 22:29

## 2022-07-18 RX ADMIN — ASPIRIN 81 MG: 81 TABLET, COATED ORAL at 08:06

## 2022-07-18 RX ADMIN — HEPARIN SODIUM 5000 UNITS: 5000 INJECTION INTRAVENOUS; SUBCUTANEOUS at 05:12

## 2022-07-18 NOTE — PROGRESS NOTES
Progress Note - Norm Sos 82 y o  male MRN: [id number]    Unit/Bed#: E2 -02 Encounter: [id number]      Subjective: The patient is feeling pretty well  He said he slept okay  He is eating well  He had no chest pain, shortness of breath, abdominal pain, nausea, or vomiting  His bowels are moving well  Physical Exam:   Temp:  [97 4 °F (36 3 °C)-98 3 °F (36 8 °C)] 98 3 °F (36 8 °C)  HR:  [69-83] 69  Resp:  [18-20] 18  BP: (138-156)/(62-83) 138/62    Gen:  Well-developed, well-nourished, in no distress  Neck:  Supple  No lymphadenopathy, goiter, or bruit  Heart:  Regular rhythm  I heard no murmur, gallop, or rub  Lungs:  Clear to auscultation percussion  No wheezing, rales, or rhonchi  Abd:  Soft with active bowel sounds  No mass, tenderness, or organomegaly  Extremities:  No clubbing, cyanosis, or edema  Left leg is wrapped  Neuro:  Alert and conversant  No focal sign  Skin:  Warm and dry      LABS:  No new labs        Patient Active Problem List   Diagnosis    [facility]) [facility] [address] [facility]    Cellulitis    Leg mass    Type 2 diabetes mellitus with hyperglycemia, without long-term [facility] insulin [facility]    Hypertension    Encounter for competency evaluation       Assessment/Plan:  1  Left leg wound with cellulitis   2  Type 2 diabetes  3  Hypertension   4  Acute kidney injury superimposed on chronic kidney disease stage II, resolved  5  Inability to make informed medical decisions    The patient remains on IV antibiotics  Biopsy of the left leg is currently pending  The patient's glucose has improved  He is receiving physical and occupational therapy  Short-term rehab is anticipated      VTE Pharmacologic Prophylaxis: Heparin  VTE Mechanical Prophylaxis: reason for no mechanical VTE prophylaxis Leg wound no left hand/yes

## 2022-07-18 NOTE — WOUND OSTOMY CARE
Progress Note - Wound   Rita Hives 82 y o  male MRN: [id number]  Unit/Bed#: E2 -02 Encounter: [id number]        Assessment:   Patient seen for wound care follow-up  He is agreeable to assessment  Accumax pump in use  Patient sitting on EHOB offloading air cushion in bed  Able to turn independently during assessment  Incontinent of urine at times  Buttocks and sacrum intact  Heels intact, blanchable, pink  Lower legs with scaling  1   Left lower extremity wound--purple/black mass to medial/posterior aspect of leg with surrounding healing, pink, now partial thickness open areas  Small area of partial thickness open area to posterior leg measuring 3x 4 x 0 1 cm  Small serosanguinous drainage noted from area directly adjacent to mass  No rowdy-wound induration  Painful with cleansing and assessment  Primary RN made aware of patient request for pain medication  Biopsy of mass on 7/13/2022 pending  See flowsheet for wound details      Wound Care Plan:   1-Hydraguard lotion to right lower leg, bilateral buttocks, sacrum, and heels three times daily and as needed  2-Elevate heels off of bed/chair surface to offload pressure  3-Offloading air cushion in chair when out of bed  4-Moisturize skin daily with skin nourishing cream   5-Turn/reposition every 2 hours while in bed and weight shift frequently while in chair for pressure re-distribution on skin  6-Left leg--cleanse with normal saline, pat dry  Apply adaptic to open areas/mass  Cover with ABDs  Wrap with rolled gauze  Change dressing daily and as needed with soilage      Wound care team to follow  Plan of care reviewed with primary RN  Wound 07/09/22 Leg Posterior; Left (Active)   Wound Image    07/18/22 1404   Wound Description Pink;Black 07/18/22 1404   Rowdy-wound Assessment Erythema;Fragile;Scaly 07/18/22 1404   Wound Length (cm) 4 5 cm 07/18/22 1404   Wound Width (cm) 6 cm 07/18/22 1404   Wound Depth (cm) 0 1 cm 07/18/22 1404   Wound Surface Area (cm^2) 27 cm^2 07/18/22 1404   Wound Volume (cm^3) 2 7 cm^3 07/18/22 1404   Calculated Wound Volume (cm^3) 2 7 cm^3 07/18/22 1404   Change in Wound Size % 84 66 07/18/22 1404   Drainage Amount Small 07/18/22 1404   Drainage Description Serosanguineous 07/18/22 1404   Treatments Cleansed;Elevated 07/18/22 1404   Dressing Non adherent;Calcium Alginate;ABD 07/18/22 1404   Patient Tolerance Tolerated well 07/18/22 1404   Dressing Status Clean;Dry; Intact 07/18/22 Wander PAULN, RN, [employer]

## 2022-07-18 NOTE — PLAN OF CARE
Problem: Potential for Falls  Goal: Patient will remain free of falls  Description: INTERVENTIONS:  - Educate patient/family on patient safety including physical limitations  - Instruct patient to call for assistance with activity   - Consult OT/PT to assist with strengthening/mobility   - Keep Call bell within reach  - Keep bed low and locked with side rails adjusted as appropriate  - Keep care items and personal belongings within reach  - Initiate and maintain comfort rounds  - Make Fall Risk Sign visible to staff  - Offer Toileting every 2 Hours, in advance of need  - Initiate/Maintain bed/chair alarm  - Obtain necessary fall risk management equipment:bed/chair alarm, call bell, walker, gripper socks, rounds  - Apply yellow socks and bracelet for high fall risk patients  - Consider moving patient to room near nurses station  Outcome: Progressing     Problem: MOBILITY - ADULT  Goal: Maintain or return to baseline ADL function  Description: INTERVENTIONS:  -  Assess patient's ability to carry out ADLs; assess patient's baseline for ADL function and identify physical deficits which impact ability to perform ADLs (bathing, care of mouth/teeth, toileting, grooming, dressing, etc )  - Assess/evaluate cause of self-care deficits   - Assess range of motion  - Assess patient's mobility; develop plan if impaired  - Assess patient's need for assistive devices and provide as appropriate  - Encourage maximum independence but intervene and supervise when necessary  - Involve family in performance of ADLs  - Assess for home care needs following discharge   - Consider OT consult to assist with ADL evaluation and planning for discharge  - Provide patient education as appropriate  Outcome: Progressing     Problem: INFECTION - ADULT  Goal: Absence or prevention of progression during hospitalization  Description: INTERVENTIONS:  - Assess and monitor for signs and symptoms of infection  - Monitor lab/diagnostic results  - Monitor all insertion sites, i e  indwelling lines, tubes, and drains  - Monitor endotracheal if appropriate and nasal secretions for changes in amount and color  - [place] appropriate cooling/warming therapies per order  - Administer medications as ordered  - Instruct and encourage patient and family to use good hand hygiene technique  - Identify and instruct in appropriate isolation precautions for identified infection/condition  Outcome: Progressing     Problem: DISCHARGE PLANNING  Goal: Discharge to home or other facility with appropriate resources  Description: INTERVENTIONS:  - Identify barriers to discharge w/patient and caregiver  - Arrange for needed discharge resources and transportation as appropriate  - Identify discharge learning needs (meds, wound care, etc )  - Arrange for interpretive services to assist at discharge as needed  - Refer to Case Management Department for coordinating discharge planning if the patient needs post-hospital services based on physician/advanced practitioner order or complex needs related to functional status, cognitive ability, or social support system  Outcome: Progressing

## 2022-07-18 NOTE — CASE MANAGEMENT
Case Management Progress Note    Patient name Xin Patel  Location [facility] /E2 -* MRN [id number]  : 1935 Date 2022       LOS (days): 9  Geometric Mean LOS (GMLOS) (days): 3 20  Days to GMLOS:-5 8        OBJECTIVE:        Current admission status: Inpatient  Preferred Pharmacy:   [address]  Phone: [phone] Fax: [phone]    Primary Care Provider: No primary care provider on file  Primary Insurance: MEDICARE  Secondary Insurance: [address] [employer]    PROGRESS NOTE:    CM spoke with pt's daughter and provided her with the current accepting facilities  At this time, [facility]  Daughter reported that she would look up both facilities and provide a decision tomorrow  CM will continue to follow

## 2022-07-19 LAB
GLUCOSE SERPL-MCNC: 121 MG/DL (ref 65–140)
GLUCOSE SERPL-MCNC: 126 MG/DL (ref 65–140)
GLUCOSE SERPL-MCNC: 139 MG/DL (ref 65–140)
GLUCOSE SERPL-MCNC: 143 MG/DL (ref 65–140)

## 2022-07-19 PROCEDURE — 82948 REAGENT STRIP/BLOOD GLUCOSE: CPT

## 2022-07-19 PROCEDURE — 99232 SBSQ HOSP IP/OBS MODERATE 35: CPT | Performed by: INTERNAL MEDICINE

## 2022-07-19 RX ADMIN — HEPARIN SODIUM 5000 UNITS: 5000 INJECTION INTRAVENOUS; SUBCUTANEOUS at 13:27

## 2022-07-19 RX ADMIN — HEPARIN SODIUM 5000 UNITS: 5000 INJECTION INTRAVENOUS; SUBCUTANEOUS at 05:47

## 2022-07-19 RX ADMIN — AMLODIPINE BESYLATE 10 MG: 10 TABLET ORAL at 08:09

## 2022-07-19 RX ADMIN — OXYCODONE 2.5 MG: 5 TABLET ORAL at 08:08

## 2022-07-19 RX ADMIN — ASPIRIN 81 MG: 81 TABLET, COATED ORAL at 08:09

## 2022-07-19 RX ADMIN — HEPARIN SODIUM 5000 UNITS: 5000 INJECTION INTRAVENOUS; SUBCUTANEOUS at 21:14

## 2022-07-19 RX ADMIN — OXYCODONE 2.5 MG: 5 TABLET ORAL at 13:27

## 2022-07-19 NOTE — PHYSICAL THERAPY NOTE
Physical Therapy Cancellation Note          Attempted to see pt for PT treatment session, however pt  Declining at this time due to fatigue  Attempted top educate pt on benefits of mobiltty, however pt continued to decline  Will continue to follow as per PT POC    Genny Lopez, PTA

## 2022-07-19 NOTE — PLAN OF CARE
Problem: Potential for Falls  Goal: Patient will remain free of falls  Description: INTERVENTIONS:  - Educate patient/family on patient safety including physical limitations  - Instruct patient to call for assistance with activity   - Consult OT/PT to assist with strengthening/mobility   - Keep Call bell within reach  - Keep bed low and locked with side rails adjusted as appropriate  - Keep care items and personal belongings within reach  - Initiate and maintain comfort rounds  - Make Fall Risk Sign visible to staff  - Offer Toileting every 2 Hours, in advance of need  - Initiate/Maintain bed/chair alarm  - Obtain necessary fall risk management equipment:bed/chair alarm, call bell, walker, gripper socks, rounds  - Apply yellow socks and bracelet for high fall risk patients  - Consider moving patient to room near nurses station  Outcome: Progressing     Problem: Prexisting or High Potential for Compromised Skin Integrity  Goal: Skin integrity is maintained or improved  Description: INTERVENTIONS:  - Identify patients at risk for skin breakdown  - Assess and monitor skin integrity  - Assess and monitor nutrition and hydration status  - Monitor labs   - Assess for incontinence   - Instruct patient to turn and repostion every 2 hours while in bed and weight shift every 15 minutes while in chair  - Assist with mobility/ambulation  - Relieve pressure over bony prominences  - Avoid friction and shearing  - Provide appropriate hygiene as needed including keeping skin clean and dry  - Evaluate need for skin moisturizer/barrier cream  - Collaborate with interdisciplinary team   - Patient/family teaching  - Consider wound care consult   Outcome: Progressing     Problem: MOBILITY - ADULT  Goal: Maintain or return to baseline ADL function  Description: INTERVENTIONS:  -  Assess patient's ability to carry out ADLs; assess patient's baseline for ADL function and identify physical deficits which impact ability to perform ADLs (bathing, care of mouth/teeth, toileting, grooming, dressing, etc )  - Assess/evaluate cause of self-care deficits   - Assess range of motion  - Assess patient's mobility; develop plan if impaired  - Assess patient's need for assistive devices and provide as appropriate  - Encourage maximum independence but intervene and supervise when necessary  - Involve family in performance of ADLs  - Assess for home care needs following discharge   - Consider OT consult to assist with ADL evaluation and planning for discharge  - Provide patient education as appropriate  Outcome: Progressing  Goal: Maintains/Returns to pre admission functional level  Description: INTERVENTIONS:  - Perform BMAT or MOVE assessment daily    - Set and communicate daily mobility goal to care team and patient/family/caregiver  - Collaborate with rehabilitation services on mobility goals if consulted  - Perform Range of Motion 2 times a day  - Reposition patient every 2 hours    - Dangle patient 2 times a day  - Stand patient 2 times a day  - Ambulate patient 2 times a day  - Out of bed to chair 2 times a day   - Out of bed for meals 2 times a day  - Out of bed for toileting  - Record patient progress and toleration of activity level   Outcome: Progressing     Problem: INFECTION - ADULT  Goal: Absence or prevention of progression during hospitalization  Description: INTERVENTIONS:  - Assess and monitor for signs and symptoms of infection  - Monitor lab/diagnostic results  - Monitor all insertion sites, i e  indwelling lines, tubes, and drains  - Monitor endotracheal if appropriate and nasal secretions for changes in amount and color  - [place] appropriate cooling/warming therapies per order  - Administer medications as ordered  - Instruct and encourage patient and family to use good hand hygiene technique  - Identify and instruct in appropriate isolation precautions for identified infection/condition  Outcome: Progressing     Problem: DISCHARGE PLANNING  Goal: Discharge to home or other facility with appropriate resources  Description: INTERVENTIONS:  - Identify barriers to discharge w/patient and caregiver  - Arrange for needed discharge resources and transportation as appropriate  - Identify discharge learning needs (meds, wound care, etc )  - Arrange for interpretive services to assist at discharge as needed  - Refer to Case Management Department for coordinating discharge planning if the patient needs post-hospital services based on physician/advanced practitioner order or complex needs related to functional status, cognitive ability, or social support system  Outcome: Progressing     Problem: PAIN - ADULT  Goal: Verbalizes/displays adequate comfort level or baseline comfort level  Description: Interventions:  - Encourage patient to monitor pain and request assistance  - Assess pain using appropriate pain scale  - Administer analgesics based on type and severity of pain and evaluate response  - Implement non-pharmacological measures as appropriate and evaluate response  - Consider cultural and social influences on pain and pain management  - Notify physician/advanced practitioner if interventions unsuccessful or patient reports new pain  Outcome: Progressing     Problem: SKIN/TISSUE INTEGRITY - ADULT  Goal: Incision(s), wounds(s) or drain site(s) healing without S/S of infection  Description: INTERVENTIONS  - Assess and document dressing, incision, wound bed, drain sites and surrounding tissue  - Provide patient and family education  - Perform skin care/dressing changes  Outcome: Progressing     Problem: CONFUSION/THOUGHT DISTURBANCE  Goal: Thought disturbances (confusion, delirium, depression, dementia or psychosis) are managed to maintain or return to baseline mental status and functional level  Description: INTERVENTIONS:  - Assess for possible contributors to  thought disturbance, including but not limited to medications, infection, impaired vision or hearing, underlying metabolic abnormalities, dehydration, respiratory compromise,  psychiatric diagnoses and notify attending PHYSICAN/AP  - Monitor and intervene to maintain adequate nutrition, hydration, elimination, sleep and activity  - Decrease environmental stimuli, including noise as appropriate  - Provide frequent contacts to provide refocusing, direction and reassurance as needed  Approach patient calmly with eye contact and at their level    - [place] high risk fall precautions, aspiration precautions and other safety measures, as indicated  - If delirium suspected, notify physician/AP of change in condition and request immediate in-person evaluation  - Pursue consults as appropriate including Geriatric (campus dependent), OT for cognitive evaluation/activity planning, psychiatric, pastoral care, etc   Outcome: Progressing

## 2022-07-19 NOTE — PROGRESS NOTES
Progress Note - Chaparrita Valdez 82 y o  male MRN: [id number]    Unit/Bed#: E2 -01 Encounter: [id number]      Subjective: The patient is feeling pretty well  He denies chest pain, shortness of breath, abdominal pain, nausea, or vomiting  He is eating well  He does have some pain in his leg at times  Physical Exam:   Temp:  [97 3 °F (36 3 °C)-98 5 °F (36 9 °C)] 98 3 °F (36 8 °C)  HR:  [86-93] 89  Resp:  [18] 18  BP: (136-145)/(68-81) 136/81    Gen:  Well-developed, well-nourished, in no distress  Neck:  Supple  No lymphadenopathy, goiter, or bruit  Heart:  Regular rhythm  I heard no murmur, gallop, or rub  Lungs:  Clear to auscultation and percussion  No wheezing, rales, or rhonchi  Abd:  Soft with active bowel sounds  No mass, tenderness, or organomegaly  Extremities:  No clubbing, cyanosis, or edema  Left leg is wrapped  Neuro:  Alert and conversant  No focal sign  Skin:  Warm and dry  LABS:   Biopsy results are pending  Patient Active Problem List   Diagnosis    AKUA (acute kidney [facility]) ([facility] [address] [facility]    Cellulitis    Leg mass    Type 2 diabetes mellitus with hyperglycemia, without long-term current [facility]    Hypertension    Encounter for competency evaluation       Assessment/Plan:  1  Left leg wound with cellulitis  2  Type 2 diabetes  3  Hypertension  4  Acute kidney injury superimposed on chronic kidney disease stage 2, resolved  5  Inability to make informed medical decisions    The patient is doing pretty well overall  His glucose has improved and short-acting insulin will be stopped  I talked with the pathology Department today  Biopsy results are expected by tomorrow  Arrangements for post hospital care are being made        VTE Pharmacologic Prophylaxis: Heparin  VTE Mechanical Prophylaxis: reason for no mechanical VTE prophylaxis Leg wound

## 2022-07-19 NOTE — PLAN OF CARE
Problem: Potential for Falls  Goal: Patient will remain free of falls  Description: INTERVENTIONS:  - Educate patient/family on patient safety including physical limitations  - Instruct patient to call for assistance with activity   - Consult OT/PT to assist with strengthening/mobility   - Keep Call bell within reach  - Keep bed low and locked with side rails adjusted as appropriate  - Keep care items and personal belongings within reach  - Initiate and maintain comfort rounds  - Make Fall Risk Sign visible to staff  - Offer Toileting every 2 Hours, in advance of need  - Initiate/Maintain bed/chair alarm  - Obtain necessary fall risk management equipment:bed/chair alarm, call bell, walker, gripper socks, rounds  - Apply yellow socks and bracelet for high fall risk patients  - Consider moving patient to room near nurses station  Outcome: Progressing     Problem: Prexisting or High Potential for Compromised Skin Integrity  Goal: Skin integrity is maintained or improved  Description: INTERVENTIONS:  - Identify patients at risk for skin breakdown  - Assess and monitor skin integrity  - Assess and monitor nutrition and hydration status  - Monitor labs   - Assess for incontinence   - Instruct patient to turn and repostion every 2 hours while in bed and weight shift every 15 minutes while in chair  - Assist with mobility/ambulation  - Relieve pressure over bony prominences  - Avoid friction and shearing  - Provide appropriate hygiene as needed including keeping skin clean and dry  - Evaluate need for skin moisturizer/barrier cream  - Collaborate with interdisciplinary team   - Patient/family teaching  - Consider wound care consult   Outcome: Progressing     Problem: MOBILITY - ADULT  Goal: Maintain or return to baseline ADL function  Description: INTERVENTIONS:  -  Assess patient's ability to carry out ADLs; assess patient's baseline for ADL function and identify physical deficits which impact ability to perform ADLs (bathing, care of mouth/teeth, toileting, grooming, dressing, etc )  - Assess/evaluate cause of self-care deficits   - Assess range of motion  - Assess patient's mobility; develop plan if impaired  - Assess patient's need for assistive devices and provide as appropriate  - Encourage maximum independence but intervene and supervise when necessary  - Involve family in performance of ADLs  - Assess for home care needs following discharge   - Consider OT consult to assist with ADL evaluation and planning for discharge  - Provide patient education as appropriate  Outcome: Progressing  Goal: Maintains/Returns to pre admission functional level  Description: INTERVENTIONS:  - Perform BMAT or MOVE assessment daily    - Set and communicate daily mobility goal to care team and patient/family/caregiver  - Collaborate with rehabilitation services on mobility goals if consulted  - Perform Range of Motion 3 times a day  - Reposition patient every 2 hours    - Dangle patient 3 times a day  - Stand patient 3 times a day  - Ambulate patient 3 times a day  - Out of bed to chair 3 times a day   - Out of bed for meals 3 times a day  - Out of bed for toileting  - Record patient progress and toleration of activity level   Outcome: Progressing     Problem: INFECTION - ADULT  Goal: Absence or prevention of progression during hospitalization  Description: INTERVENTIONS:  - Assess and monitor for signs and symptoms of infection  - Monitor lab/diagnostic results  - Monitor all insertion sites, i e  indwelling lines, tubes, and drains  - Monitor endotracheal if appropriate and nasal secretions for changes in amount and color  - [place] appropriate cooling/warming therapies per order  - Administer medications as ordered  - Instruct and encourage patient and family to use good hand hygiene technique  - Identify and instruct in appropriate isolation precautions for identified infection/condition  Outcome: Progressing     Problem: DISCHARGE PLANNING  Goal: Discharge to home or other facility with appropriate resources  Description: INTERVENTIONS:  - Identify barriers to discharge w/patient and caregiver  - Arrange for needed discharge resources and transportation as appropriate  - Identify discharge learning needs (meds, wound care, etc )  - Arrange for interpretive services to assist at discharge as needed  - Refer to Case Management Department for coordinating discharge planning if the patient needs post-hospital services based on physician/advanced practitioner order or complex needs related to functional status, cognitive ability, or social support system  Outcome: Progressing     Problem: SKIN/TISSUE INTEGRITY - ADULT  Goal: Incision(s), wounds(s) or drain site(s) healing without S/S of infection  Description: INTERVENTIONS  - Assess and document dressing, incision, wound bed, drain sites and surrounding tissue  - Provide patient and family education  - Perform skin care/dressing changes as ordered  Outcome: Progressing     Problem: PAIN - ADULT  Goal: Verbalizes/displays adequate comfort level or baseline comfort level  Description: Interventions:  - Encourage patient to monitor pain and request assistance  - Assess pain using appropriate pain scale  - Administer analgesics based on type and severity of pain and evaluate response  - Implement non-pharmacological measures as appropriate and evaluate response  - Consider cultural and social influences on pain and pain management  - Notify physician/advanced practitioner if interventions unsuccessful or patient reports new pain  Outcome: Progressing     Problem: CONFUSION/THOUGHT DISTURBANCE  Goal: Thought disturbances (confusion, delirium, depression, dementia or psychosis) are managed to maintain or return to baseline mental status and functional level  Description: INTERVENTIONS:  - Assess for possible contributors to  thought disturbance, including but not limited to medications, infection, impaired vision or hearing, underlying metabolic abnormalities, dehydration, respiratory compromise,  psychiatric diagnoses and notify attending PHYSICAN/AP  - Monitor and intervene to maintain adequate nutrition, hydration, elimination, sleep and activity  - Decrease environmental stimuli, including noise as appropriate  - Provide frequent contacts to provide refocusing, direction and reassurance as needed  Approach patient calmly with eye contact and at their level    - [place] high risk fall precautions, aspiration precautions and other safety measures, as indicated  - If delirium suspected, notify physician/AP of change in condition and request immediate in-person evaluation  - Pursue consults as appropriate including Geriatric (campus dependent), OT for cognitive evaluation/activity planning, psychiatric, pastoral care, etc   Outcome: Progressing

## 2022-07-20 LAB
FLUAV RNA RESP QL NAA+PROBE: NEGATIVE
FLUBV RNA RESP QL NAA+PROBE: NEGATIVE
GLUCOSE SERPL-MCNC: 125 MG/DL (ref 65–140)
GLUCOSE SERPL-MCNC: 126 MG/DL (ref 65–140)
GLUCOSE SERPL-MCNC: 135 MG/DL (ref 65–140)
GLUCOSE SERPL-MCNC: 144 MG/DL (ref 65–140)
RSV RNA RESP QL NAA+PROBE: NEGATIVE
SARS-COV-2 RNA RESP QL NAA+PROBE: POSITIVE

## 2022-07-20 PROCEDURE — 82948 REAGENT STRIP/BLOOD GLUCOSE: CPT

## 2022-07-20 PROCEDURE — 0241U HB NFCT DS VIR RESP RNA 4 TRGT: CPT | Performed by: INTERNAL MEDICINE

## 2022-07-20 PROCEDURE — 99232 SBSQ HOSP IP/OBS MODERATE 35: CPT | Performed by: INTERNAL MEDICINE

## 2022-07-20 RX ADMIN — AMLODIPINE BESYLATE 10 MG: 10 TABLET ORAL at 08:51

## 2022-07-20 RX ADMIN — HEPARIN SODIUM 5000 UNITS: 5000 INJECTION INTRAVENOUS; SUBCUTANEOUS at 05:11

## 2022-07-20 RX ADMIN — ACETAMINOPHEN 650 MG: 325 TABLET, FILM COATED ORAL at 21:23

## 2022-07-20 RX ADMIN — HEPARIN SODIUM 5000 UNITS: 5000 INJECTION INTRAVENOUS; SUBCUTANEOUS at 21:23

## 2022-07-20 RX ADMIN — ACETAMINOPHEN 650 MG: 325 TABLET, FILM COATED ORAL at 09:06

## 2022-07-20 RX ADMIN — ASPIRIN 81 MG: 81 TABLET, COATED ORAL at 08:51

## 2022-07-20 NOTE — PLAN OF CARE
Problem: Potential for Falls  Goal: Patient will remain free of falls  Description: INTERVENTIONS:  - Educate patient/family on patient safety including physical limitations  - Instruct patient to call for assistance with activity   - Consult OT/PT to assist with strengthening/mobility   - Keep Call bell within reach  - Keep bed low and locked with side rails adjusted as appropriate  - Keep care items and personal belongings within reach  - Initiate and maintain comfort rounds  - Make Fall Risk Sign visible to staff  - Offer Toileting every 2 Hours, in advance of need  - Initiate/Maintain bed/chair alarm  - Obtain necessary fall risk management equipment:bed/chair alarm, call bell, walker, gripper socks, rounds  - Apply yellow socks and bracelet for high fall risk patients  - Consider moving patient to room near nurses station  Outcome: Progressing     Problem: MOBILITY - ADULT  Goal: Maintain or return to baseline ADL function  Description: INTERVENTIONS:  -  Assess patient's ability to carry out ADLs; assess patient's baseline for ADL function and identify physical deficits which impact ability to perform ADLs (bathing, care of mouth/teeth, toileting, grooming, dressing, etc )  - Assess/evaluate cause of self-care deficits   - Assess range of motion  - Assess patient's mobility; develop plan if impaired  - Assess patient's need for assistive devices and provide as appropriate  - Encourage maximum independence but intervene and supervise when necessary  - Involve family in performance of ADLs  - Assess for home care needs following discharge   - Consider OT consult to assist with ADL evaluation and planning for discharge  - Provide patient education as appropriate  Outcome: Progressing     Problem: PAIN - ADULT  Goal: Verbalizes/displays adequate comfort level or baseline comfort level  Description: Interventions:  - Encourage patient to monitor pain and request assistance  - Assess pain using appropriate pain scale  - Administer analgesics based on type and severity of pain and evaluate response  - Implement non-pharmacological measures as appropriate and evaluate response  - Consider cultural and social influences on pain and pain management  - Notify physician/advanced practitioner if interventions unsuccessful or patient reports new pain  Outcome: Progressing

## 2022-07-20 NOTE — PROGRESS NOTES
Progress Note - Lor Snowden 82 y o  male MRN: [id number]    Unit/Bed#: E2 -01 Encounter: [id number]      Subjective: The patient is feeling pretty well  He denies any chest pain, shortness a breath, cough, abdominal pain, nausea, or vomiting  Physical Exam:   Temp:  [98 1 °F (36 7 °C)-98 8 °F (37 1 °C)] 98 6 °F (37 °C)  HR:  [79-89] 88  Resp:  [18-20] 18  BP: (136-152)/(76-88) 139/88    Gen:  Well-developed, well-nourished, no distress  Neck:  Supple  No lymphadenopathy, goiter, or bruit  Heart:  Regular rhythm  No murmur, gallop, or rub  Lungs:  Clear to auscultation and percussion  No wheezing, rales, or rhonchi  Abd:  Soft with active bowel sounds  No mass, tenderness, or organomegaly  Extremities:  No clubbing, cyanosis, or edema  Left leg is wrapped  Neuro:  Alert and conversant  No focal sign  Skin:  Warm and dry  LABS:  Preliminary report on the patient's soft tissue biopsy is malignant  Additional studies are in progress  COVID-19 PCR is positive  Assessment/Plan:  1  Left leg cellulitis  2  Cutaneous malignancy, additional pathologic studies pending  3  Type 2 diabetes  4  Hypertension  5  Acute kidney injury superimposed on chronic kidney disease stage 2, resolved  6  Inability to make informed medical decisions  7  Asymptomatic COVID-19      The patient had been scheduled to go to rehab today but his positive COVID test precludes this  I spoke with pathology about his biopsy results  The tissue was clearly malignant but additional studies are in process  This will take several more days  I spoke with surgery  They will clarify treatment plans after final pathologic diagnosis is rendered      VTE Pharmacologic Prophylaxis: Heparin  VTE Mechanical Prophylaxis: reason for no mechanical VTE prophylaxis Leg wound

## 2022-07-20 NOTE — PLAN OF CARE
Problem: Potential for Falls  Goal: Patient will remain free of falls  Description: INTERVENTIONS:  - Educate patient/family on patient safety including physical limitations  - Instruct patient to call for assistance with activity   - Consult OT/PT to assist with strengthening/mobility   - Keep Call bell within reach  - Keep bed low and locked with side rails adjusted as appropriate  - Keep care items and personal belongings within reach  - Initiate and maintain comfort rounds  - Make Fall Risk Sign visible to staff  - Offer Toileting every 2 Hours, in advance of need  - Initiate/Maintain bed/chair alarm  - Obtain necessary fall risk management equipment:bed/chair alarm, call bell, walker, gripper socks, rounds  - Apply yellow socks and bracelet for high fall risk patients  - Consider moving patient to room near nurses station  Outcome: Progressing     Problem: Prexisting or High Potential for Compromised Skin Integrity  Goal: Skin integrity is maintained or improved  Description: INTERVENTIONS:  - Identify patients at risk for skin breakdown  - Assess and monitor skin integrity  - Assess and monitor nutrition and hydration status  - Monitor labs   - Assess for incontinence   - Instruct patient to turn and repostion every 2 hours while in bed and weight shift every 15 minutes while in chair  - Assist with mobility/ambulation  - Relieve pressure over bony prominences  - Avoid friction and shearing  - Provide appropriate hygiene as needed including keeping skin clean and dry  - Evaluate need for skin moisturizer/barrier cream  - Collaborate with interdisciplinary team   - Patient/family teaching  - Consider wound care consult   Outcome: Progressing     Problem: MOBILITY - ADULT  Goal: Maintain or return to baseline ADL function  Description: INTERVENTIONS:  -  Assess patient's ability to carry out ADLs; assess patient's baseline for ADL function and identify physical deficits which impact ability to perform ADLs (bathing, care of mouth/teeth, toileting, grooming, dressing, etc )  - Assess/evaluate cause of self-care deficits   - Assess range of motion  - Assess patient's mobility; develop plan if impaired  - Assess patient's need for assistive devices and provide as appropriate  - Encourage maximum independence but intervene and supervise when necessary  - Involve family in performance of ADLs  - Assess for home care needs following discharge   - Consider OT consult to assist with ADL evaluation and planning for discharge  - Provide patient education as appropriate  Outcome: Progressing  Goal: Maintains/Returns to pre admission functional level  Description: INTERVENTIONS:  - Perform BMAT or MOVE assessment daily    - Set and communicate daily mobility goal to care team and patient/family/caregiver  - Collaborate with rehabilitation services on mobility goals if consulted  - Perform Range of Motion 2 times a day  - Reposition patient every 2 hours    - Dangle patient 2 times a day  - Stand patient 2 times a day  - Ambulate patient 2 times a day  - Out of bed to chair 2 times a day   - Out of bed for meals 2 times a day  - Out of bed for toileting  - Record patient progress and toleration of activity level   Outcome: Progressing     Problem: INFECTION - ADULT  Goal: Absence or prevention of progression during hospitalization  Description: INTERVENTIONS:  - Assess and monitor for signs and symptoms of infection  - Monitor lab/diagnostic results  - Monitor all insertion sites, i e  indwelling lines, tubes, and drains  - Monitor endotracheal if appropriate and nasal secretions for changes in amount and color  - [place] appropriate cooling/warming therapies per order  - Administer medications as ordered  - Instruct and encourage patient and family to use good hand hygiene technique  - Identify and instruct in appropriate isolation precautions for identified infection/condition  Outcome: Progressing     Problem: DISCHARGE PLANNING  Goal: Discharge to home or other facility with appropriate resources  Description: INTERVENTIONS:  - Identify barriers to discharge w/patient and caregiver  - Arrange for needed discharge resources and transportation as appropriate  - Identify discharge learning needs (meds, wound care, etc )  - Arrange for interpretive services to assist at discharge as needed  - Refer to Case Management Department for coordinating discharge planning if the patient needs post-hospital services based on physician/advanced practitioner order or complex needs related to functional status, cognitive ability, or social support system  Outcome: Progressing     Problem: SKIN/TISSUE INTEGRITY - ADULT  Goal: Incision(s), wounds(s) or drain site(s) healing without S/S of infection  Description: INTERVENTIONS  - Assess and document dressing, incision, wound bed, drain sites and surrounding tissue  - Provide patient and family education  - Perform skin care/dressing changes every 2  Outcome: Progressing     Problem: PAIN - ADULT  Goal: Verbalizes/displays adequate comfort level or baseline comfort level  Description: Interventions:  - Encourage patient to monitor pain and request assistance  - Assess pain using appropriate pain scale  - Administer analgesics based on type and severity of pain and evaluate response  - Implement non-pharmacological measures as appropriate and evaluate response  - Consider cultural and social influences on pain and pain management  - Notify physician/advanced practitioner if interventions unsuccessful or patient reports new pain  Outcome: Progressing     Problem: CONFUSION/THOUGHT DISTURBANCE  Goal: Thought disturbances (confusion, delirium, depression, dementia or psychosis) are managed to maintain or return to baseline mental status and functional level  Description: INTERVENTIONS:  - Assess for possible contributors to  thought disturbance, including but not limited to medications, infection, impaired vision or hearing, underlying metabolic abnormalities, dehydration, respiratory compromise,  psychiatric diagnoses and notify attending PHYSICAN/AP  - Monitor and intervene to maintain adequate nutrition, hydration, elimination, sleep and activity  - Decrease environmental stimuli, including noise as appropriate  - Provide frequent contacts to provide refocusing, direction and reassurance as needed  Approach patient calmly with eye contact and at their level    - [place] high risk fall precautions, aspiration precautions and other safety measures, as indicated  - If delirium suspected, notify physician/AP of change in condition and request immediate in-person evaluation  - Pursue consults as appropriate including Geriatric (campus dependent), OT for cognitive evaluation/activity planning, psychiatric, pastoral care, etc   Outcome: Progressing

## 2022-07-21 LAB
GLUCOSE SERPL-MCNC: 115 MG/DL (ref 65–140)
GLUCOSE SERPL-MCNC: 133 MG/DL (ref 65–140)
GLUCOSE SERPL-MCNC: 153 MG/DL (ref 65–140)
GLUCOSE SERPL-MCNC: 165 MG/DL (ref 65–140)

## 2022-07-21 PROCEDURE — 99232 SBSQ HOSP IP/OBS MODERATE 35: CPT | Performed by: INTERNAL MEDICINE

## 2022-07-21 PROCEDURE — 97110 THERAPEUTIC EXERCISES: CPT

## 2022-07-21 PROCEDURE — 82948 REAGENT STRIP/BLOOD GLUCOSE: CPT

## 2022-07-21 RX ADMIN — AMLODIPINE BESYLATE 10 MG: 10 TABLET ORAL at 08:10

## 2022-07-21 RX ADMIN — HEPARIN SODIUM 5000 UNITS: 5000 INJECTION INTRAVENOUS; SUBCUTANEOUS at 14:04

## 2022-07-21 RX ADMIN — ACETAMINOPHEN 650 MG: 325 TABLET, FILM COATED ORAL at 05:21

## 2022-07-21 RX ADMIN — ASPIRIN 81 MG: 81 TABLET, COATED ORAL at 08:10

## 2022-07-21 RX ADMIN — HEPARIN SODIUM 5000 UNITS: 5000 INJECTION INTRAVENOUS; SUBCUTANEOUS at 22:07

## 2022-07-21 RX ADMIN — HEPARIN SODIUM 5000 UNITS: 5000 INJECTION INTRAVENOUS; SUBCUTANEOUS at 05:08

## 2022-07-21 NOTE — TELEPHONE ENCOUNTER
Patient is still in-patient  As per the note documented yesterday 7/20 - patient is COVID + and preliminary biospy result shows malignancy present from wound

## 2022-07-21 NOTE — PLAN OF CARE
Problem: PHYSICAL THERAPY ADULT  Goal: Performs mobility at highest level of function for planned discharge setting  See evaluation for individualized goals  Note: Prognosis: Good  Problem List: Impaired balance, Decreased mobility, Decreased cognition, Impaired judgement, Decreased safety awareness, Decreased skin integrity  Assessment: Eugenie Karla was seen for a f/u session and to update POC as appropriate  Continues to function at S level for bed mobility, transfers and ambulation w RW  Noted x1 episode of unsteadiness during dynamic standing but able to recover balance indep w use of walker  Current 5xSTS score does indicate pt is at risk for falls, in addition to baseline cognitive deficits  May benefit from continued therapy services to address deficits of balance and reduce fall risk  Given baseline cognitive deficits would recommend supervised environment upon d/c, such as skilled nursing vs LTC vs 24/7 S at home  Barriers to Discharge: Decreased caregiver support  Barriers to Discharge Comments: lives alone  PT Discharge Recommendation: Home with home health rehabilitation (would recommend supervised environment)    See flowsheet documentation for full assessment

## 2022-07-21 NOTE — PLAN OF CARE
Problem: Potential for Falls  Goal: Patient will remain free of falls  Description: INTERVENTIONS:  - Educate patient/family on patient safety including physical limitations  - Instruct patient to call for assistance with activity   - Consult OT/PT to assist with strengthening/mobility   - Keep Call bell within reach  - Keep bed low and locked with side rails adjusted as appropriate  - Keep care items and personal belongings within reach  - Initiate and maintain comfort rounds  - Make Fall Risk Sign visible to staff  - Offer Toileting every 2 Hours, in advance of need  - Initiate/Maintain bed/chair alarm  - Obtain necessary fall risk management equipment:bed/chair alarm, call bell, walker, gripper socks, rounds  - Apply yellow socks and bracelet for high fall risk patients  - Consider moving patient to room near nurses station  Outcome: Progressing     Problem: Prexisting or High Potential for Compromised Skin Integrity  Goal: Skin integrity is maintained or improved  Description: INTERVENTIONS:  - Identify patients at risk for skin breakdown  - Assess and monitor skin integrity  - Assess and monitor nutrition and hydration status  - Monitor labs   - Assess for incontinence   - Instruct patient to turn and repostion every 2 hours while in bed and weight shift every 15 minutes while in chair  - Assist with mobility/ambulation  - Relieve pressure over bony prominences  - Avoid friction and shearing  - Provide appropriate hygiene as needed including keeping skin clean and dry  - Evaluate need for skin moisturizer/barrier cream  - Collaborate with interdisciplinary team   - Patient/family teaching  - Consider wound care consult   Outcome: Progressing     Problem: MOBILITY - ADULT  Goal: Maintain or return to baseline ADL function  Description: INTERVENTIONS:  -  Assess patient's ability to carry out ADLs; assess patient's baseline for ADL function and identify physical deficits which impact ability to perform ADLs (bathing, care of mouth/teeth, toileting, grooming, dressing, etc )  - Assess/evaluate cause of self-care deficits   - Assess range of motion  - Assess patient's mobility; develop plan if impaired  - Assess patient's need for assistive devices and provide as appropriate  - Encourage maximum independence but intervene and supervise when necessary  - Involve family in performance of ADLs  - Assess for home care needs following discharge   - Consider OT consult to assist with ADL evaluation and planning for discharge  - Provide patient education as appropriate  Outcome: Progressing  Goal: Maintains/Returns to pre admission functional level  Description: INTERVENTIONS:  - Perform BMAT or MOVE assessment daily    - Set and communicate daily mobility goal to care team and patient/family/caregiver  - Collaborate with rehabilitation services on mobility goals if consulted  - Perform Range of Motion 3 times a day  - Reposition patient every 2 hours    - Dangle patient 3 times a day  - Stand patient 3 times a day  - Ambulate patient 3 times a day  - Out of bed to chair 3 times a day   - Out of bed for meals 3 times a day  - Out of bed for toileting  - Record patient progress and toleration of activity level   Outcome: Progressing     Problem: INFECTION - ADULT  Goal: Absence or prevention of progression during hospitalization  Description: INTERVENTIONS:  - Assess and monitor for signs and symptoms of infection  - Monitor lab/diagnostic results  - Monitor all insertion sites, i e  indwelling lines, tubes, and drains  - Monitor endotracheal if appropriate and nasal secretions for changes in amount and color  - [place] appropriate cooling/warming therapies per order  - Administer medications as ordered  - Instruct and encourage patient and family to use good hand hygiene technique  - Identify and instruct in appropriate isolation precautions for identified infection/condition  Outcome: Progressing     Problem: DISCHARGE PLANNING  Goal: Discharge to home or other facility with appropriate resources  Description: INTERVENTIONS:  - Identify barriers to discharge w/patient and caregiver  - Arrange for needed discharge resources and transportation as appropriate  - Identify discharge learning needs (meds, wound care, etc )  - Arrange for interpretive services to assist at discharge as needed  - Refer to Case Management Department for coordinating discharge planning if the patient needs post-hospital services based on physician/advanced practitioner order or complex needs related to functional status, cognitive ability, or social support system  Outcome: Progressing  Problem: PAIN - ADULT  Goal: Verbalizes/displays adequate comfort level or baseline comfort level  Description: Interventions:  - Encourage patient to monitor pain and request assistance  - Assess pain using appropriate pain scale  - Administer analgesics based on type and severity of pain and evaluate response  - Implement non-pharmacological measures as appropriate and evaluate response  - Consider cultural and social influences on pain and pain management  - Notify physician/advanced practitioner if interventions unsuccessful or patient reports new pain  Outcome: Progressing     Problem: CONFUSION/THOUGHT DISTURBANCE  Goal: Thought disturbances (confusion, delirium, depression, dementia or psychosis) are managed to maintain or return to baseline mental status and functional level  Description: INTERVENTIONS:  - Assess for possible contributors to  thought disturbance, including but not limited to medications, infection, impaired vision or hearing, underlying metabolic abnormalities, dehydration, respiratory compromise,  psychiatric diagnoses and notify attending PHYSICAN/AP  - Monitor and intervene to maintain adequate nutrition, hydration, elimination, sleep and activity  - Decrease environmental stimuli, including noise as appropriate    - Provide frequent contacts to provide refocusing, direction and reassurance as needed  Approach patient calmly with eye contact and at their level    - [place] high risk fall precautions, aspiration precautions and other safety measures, as indicated  - If delirium suspected, notify physician/AP of change in condition and request immediate in-person evaluation  - Pursue consults as appropriate including Geriatric (campus dependent), OT for cognitive evaluation/activity planning, psychiatric, pastoral care, etc   Outcome: Progressing

## 2022-07-21 NOTE — PHYSICAL THERAPY NOTE
PHYSICAL THERAPY NOTE          Patient Name: Stef Kyle  [id number] Date: 7/21/2022  Time: 6786-7501       07/21/22 1439   PT Last Visit   PT Visit Date 07/21/22   Note Type   Note Type Treatment   Pain Assessment   Pain Assessment Tool 0-10   Pain Score No Pain   Pain Location/Orientation Orientation: Left; Location: Knee   Restrictions/Precautions   Other Precautions Contact/isolation; Airborne/isolation;Cognitive; Chair Alarm; Bed Alarm; Fall Risk;Pain   General   Chart Reviewed Yes   Response to Previous Treatment Patient unable to report, no changes reported from family or staff   Cognition   Overall Cognitive Status Impaired   Arousal/Participation Cooperative   Attention Attends with cues to redirect   Orientation Level Oriented to person;Oriented to place; Disoriented to time;Disoriented to situation;Oriented to time  (oriented to year only)   Memory Decreased recall of precautions;Decreased recall of recent events;Decreased short term memory   Following Commands Follows one step commands with increased time or repetition   Comments labile moods  require education regarding purpose of therapy/ benefits to recovery   Subjective   Subjective "what are we doing?   want cashews?   wheres my razor brush?"   Bed Mobility   Supine to Sit 5  Supervision   Additional items HOB elevated; Bedrails; Increased time required   Transfers   Sit to Stand 5  Supervision   Additional items Increased time required   Stand to Sit 5  Supervision   Additional items Armrests; Increased time required   Toilet transfer 5  Supervision   Additional items Increased time required;Standard toilet;Verbal cues; Other  (grab bar)   Ambulation/Elevation   Gait pattern Forward Flexion; Excessively slow   Gait Assistance 5  Supervision   Additional items Assist x 1   Assistive Device Rolling walker   Distance 10', 30'   Balance   Static Standing Fair   Dynamic Standing Fair -   Ambulatory Fair -   Activity Tolerance   Activity Tolerance Patient tolerated treatment well; Other (Comment)  (cognition)   Medical Staff Made Aware 305 Zarina Solomon RN   Exercises   Balance training  5xSTS= 32" w use of bl armrests   Assessment   Prognosis Good   Problem List Impaired balance;Decreased mobility; Decreased cognition; Impaired judgement;Decreased safety awareness;Decreased skin integrity   Assessment Karen Anderson was seen for a f/u session and to update POC as appropriate  Continues to function at S level for bed mobility, transfers and ambulation w RW  Noted x1 episode of unsteadiness during dynamic standing but able to recover balance indep w use of walker  Current 5xSTS score does indicate pt is at risk for falls, in addition to baseline cognitive deficits  May benefit from continued therapy services to address deficits of balance and reduce fall risk  Given baseline cognitive deficits would recommend supervised environment upon d/c, such as [facility] vs LTC vs 24/7 S at home  Barriers to Discharge Decreased caregiver support   Barriers to Discharge Comments lives alone   Goals   Patient Goals go home   STG Expiration Date 07/31/22   Short Term Goal #1 1)  Pt will perform bed mobility with [place] demonstrating appropriate technique 100% of the time in order to improve function  2)  Perform all transfers with [place] demonstrating safe and appropriate technique 100% of the time in order to improve ability to negotiate safely in home environment  3) Amb with least restrictive AD > 250'x1 with mod I in order to demonstrate ability to negotiate in home environment  4)  Improve overall strength and balance 1/2 grade in order to optimize ability to perform functional tasks and reduce fall risk  5) Increase activity tolerance to 45 minutes in order to improve endurance to functional tasks  6) PT for ongoing patient and family/caregiver education, DME needs and d/c planning in order to promote highest level of function in least restrictive environment  PT Treatment Day 3   Plan   Treatment/Interventions Functional transfer training;LE strengthening/ROM; Therapeutic exercise;Cognitive reorientation;Patient/family training;Equipment eval/education; Bed mobility;Gait training;Spoke to nursing; Compensatory technique education   Progress Progressing toward goals   PT Frequency 2-3x/wk   Recommendation   PT Discharge Recommendation Home with home health rehabilitation  (would recommend supervised environment)   Equipment Recommended Walker  (if pt does not own)   Pedro Perez Recommended Wheeled walker   Change/add to Pedro Perez? No   AM-PAC Basic Mobility Inpatient   Turning in Bed Without Bedrails 4   Lying on Back to Sitting on Edge of Flat Bed 4   Moving Bed to Chair 3   Standing Up From Chair 3   Walk in Room 3   Climb 3-5 Stairs 3   Basic Mobility Inpatient Raw Score 20   Basic Mobility Standardized Score 43 99   Highest Level Of Mobility   JH-HLM Goal 6: Walk 10 steps or more   JH-HLM Achieved 7: Walk 25 feet or more   Education   Education Provided Mobility training   Patient Reinforcement needed   End of Consult   Patient Position at End of Consult Bedside chair;Bed/Chair alarm activated; All needs within reach       Loly Marrero, PT

## 2022-07-21 NOTE — PLAN OF CARE
Problem: Potential for Falls  Goal: Patient will remain free of falls  Description: INTERVENTIONS:  - Educate patient/family on patient safety including physical limitations  - Instruct patient to call for assistance with activity   - Consult OT/PT to assist with strengthening/mobility   - Keep Call bell within reach  - Keep bed low and locked with side rails adjusted as appropriate  - Keep care items and personal belongings within reach  - Initiate and maintain comfort rounds  - Make Fall Risk Sign visible to staff  - Offer Toileting every 2 Hours, in advance of need  - Initiate/Maintain bed/chair alarm  - Obtain necessary fall risk management equipment:bed/chair alarm, call bell, walker, gripper socks, rounds  - Apply yellow socks and bracelet for high fall risk patients  - Consider moving patient to room near nurses station  Outcome: Progressing     Problem: Prexisting or High Potential for Compromised Skin Integrity  Goal: Skin integrity is maintained or improved  Description: INTERVENTIONS:  - Identify patients at risk for skin breakdown  - Assess and monitor skin integrity  - Assess and monitor nutrition and hydration status  - Monitor labs   - Assess for incontinence   - Instruct patient to turn and repostion every 2 hours while in bed and weight shift every 15 minutes while in chair  - Assist with mobility/ambulation  - Relieve pressure over bony prominences  - Avoid friction and shearing  - Provide appropriate hygiene as needed including keeping skin clean and dry  - Evaluate need for skin moisturizer/barrier cream  - Collaborate with interdisciplinary team   - Patient/family teaching  - Consider wound care consult   Outcome: Progressing     Problem: MOBILITY - ADULT  Goal: Maintain or return to baseline ADL function  Description: INTERVENTIONS:  -  Assess patient's ability to carry out ADLs; assess patient's baseline for ADL function and identify physical deficits which impact ability to perform ADLs (bathing, care of mouth/teeth, toileting, grooming, dressing, etc )  - Assess/evaluate cause of self-care deficits   - Assess range of motion  - Assess patient's mobility; develop plan if impaired  - Assess patient's need for assistive devices and provide as appropriate  - Encourage maximum independence but intervene and supervise when necessary  - Involve family in performance of ADLs  - Assess for home care needs following discharge   - Consider OT consult to assist with ADL evaluation and planning for discharge  - Provide patient education as appropriate  Outcome: Progressing  Goal: Maintains/Returns to pre admission functional level  Description: INTERVENTIONS:  - Perform BMAT or MOVE assessment daily    - Set and communicate daily mobility goal to care team and patient/family/caregiver  - Collaborate with rehabilitation services on mobility goals if consulted  - Perform Range of Motion imes a day  - Reposition patient every  hours    - Dangle patient  times a day  - Stand patient  times a day  - Ambulate patient  times a day  - Out of bed to chair  times a day   - Out of bed for meals  times a day  - Out of bed for toileting  - Record patient progress and toleration of activity level   Outcome: Progressing     Problem: INFECTION - ADULT  Goal: Absence or prevention of progression during hospitalization  Description: INTERVENTIONS:  - Assess and monitor for signs and symptoms of infection  - Monitor lab/diagnostic results  - Monitor all insertion sites, i e  indwelling lines, tubes, and drains  - Monitor endotracheal if appropriate and nasal secretions for changes in amount and color  - [place] appropriate cooling/warming therapies per order  - Administer medications as ordered  - Instruct and encourage patient and family to use good hand hygiene technique  - Identify and instruct in appropriate isolation precautions for identified infection/condition  Outcome: Progressing     Problem: DISCHARGE PLANNING  Goal: Discharge to home or other facility with appropriate resources  Description: INTERVENTIONS:  - Identify barriers to discharge w/patient and caregiver  - Arrange for needed discharge resources and transportation as appropriate  - Identify discharge learning needs (meds, wound care, etc )  - Arrange for interpretive services to assist at discharge as needed  - Refer to Case Management Department for coordinating discharge planning if the patient needs post-hospital services based on physician/advanced practitioner order or complex needs related to functional status, cognitive ability, or social support system  Outcome: Progressing     Problem: SKIN/TISSUE INTEGRITY - ADULT  Goal: Incision(s), wounds(s) or drain site(s) healing without S/S of infection  Description: INTERVENTIONS  - Assess and document dressing, incision, wound bed, drain sites and surrounding tissue  - Provide patient and family education  - Perform skin care/dressing changes ev  Outcome: Progressing     Problem: PAIN - ADULT  Goal: Verbalizes/displays adequate comfort level or baseline comfort level  Description: Interventions:  - Encourage patient to monitor pain and request assistance  - Assess pain using appropriate pain scale  - Administer analgesics based on type and severity of pain and evaluate response  - Implement non-pharmacological measures as appropriate and evaluate response  - Consider cultural and social influences on pain and pain management  - Notify physician/advanced practitioner if interventions unsuccessful or patient reports new pain  Outcome: Progressing     Problem: CONFUSION/THOUGHT DISTURBANCE  Goal: Thought disturbances (confusion, delirium, depression, dementia or psychosis) are managed to maintain or return to baseline mental status and functional level  Description: INTERVENTIONS:  - Assess for possible contributors to  thought disturbance, including but not limited to medications, infection, impaired vision or hearing, underlying metabolic abnormalities, dehydration, respiratory compromise,  psychiatric diagnoses and notify attending PHYSICAN/AP  - Monitor and intervene to maintain adequate nutrition, hydration, elimination, sleep and activity  - Decrease environmental stimuli, including noise as appropriate  - Provide frequent contacts to provide refocusing, direction and reassurance as needed  Approach patient calmly with eye contact and at their level    - [place] high risk fall precautions, aspiration precautions and other safety measures, as indicated  - If delirium suspected, notify physician/AP of change in condition and request immediate in-person evaluation  - Pursue consults as appropriate including Geriatric (campus dependent), OT for cognitive evaluation/activity planning, psychiatric, pastoral care, etc   Outcome: Progressing

## 2022-07-21 NOTE — PROGRESS NOTES
Progress Note - Herminia Stanley 82 y o  male MRN: [id number]    Unit/Bed#: [facility] 2 -01 Encounter: [id number]      Subjective: The patient feels rather well  He denies any pain  He slept well  He is eating well  He has no chest pain or shortness of breath  He has no abdominal pain, nausea, or vomiting  His bowels are moving satisfactorily  Physical Exam:   Temp:  [97 7 °F (36 5 °C)-100 9 °F (38 3 °C)] 97 7 °F (36 5 °C)  HR:  [] 87  Resp:  [18-20] 20  BP: (125-163)/(63-75) 125/67    Gen:  Well-developed, well-nourished, in no distress  Neck:  Supple  No lymphadenopathy or goiter  Heart:  Clear to auscultation percussion  No wheezing, rales, or rhonchi  Lungs:  Clear to auscultation and percussion  No wheezing, rales, or rhonchi  Abd:  Soft with active bowel sounds  No mass, tenderness, or organomegaly  Extremities:  No clubbing, cyanosis, or edema  No calf tenderness  The patient's surgical incision is healing  No surrounding redness is noted  Neuro:  Alert and oriented  No focal sign  Skin:  Warm and dry  LABS:  No new labs  Patient Active Problem List   Diagnosis    AKUA [facility]) [facility] [address] [facility]    Cellulitis    Leg mass    Type 2 diabetes mellitus with hyperglycemia, without long-term current use of insulin ([facility] [address] )    Hypertension    Encounter for competency evaluation       Assessment/Plan:  1  Left leg cellulitis, resolved  2  Cutaneous malignancy, 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

## 2022-07-22 PROCEDURE — 99232 SBSQ HOSP IP/OBS MODERATE 35: CPT | Performed by: INTERNAL MEDICINE

## 2022-07-22 RX ADMIN — HEPARIN SODIUM 5000 UNITS: 5000 INJECTION INTRAVENOUS; SUBCUTANEOUS at 14:21

## 2022-07-22 RX ADMIN — OXYCODONE 2.5 MG: 5 TABLET ORAL at 19:04

## 2022-07-22 RX ADMIN — HEPARIN SODIUM 5000 UNITS: 5000 INJECTION INTRAVENOUS; SUBCUTANEOUS at 05:11

## 2022-07-22 RX ADMIN — ASPIRIN 81 MG: 81 TABLET, COATED ORAL at 08:04

## 2022-07-22 RX ADMIN — AMLODIPINE BESYLATE 10 MG: 10 TABLET ORAL at 08:04

## 2022-07-22 RX ADMIN — HEPARIN SODIUM 5000 UNITS: 5000 INJECTION INTRAVENOUS; SUBCUTANEOUS at 23:17

## 2022-07-22 RX ADMIN — OXYCODONE 2.5 MG: 5 TABLET ORAL at 05:27

## 2022-07-22 NOTE — PROGRESS NOTES
Progress Note - Tish Bullock 82 y o  male MRN: [id number]    Unit/Bed#: [facility] 2 -01 Encounter: [id number]      Subjective: The patient is feeling quite well  He slept well  He is eating well  He has no chest pain, shortness of breath, cough, wheezing, etcetera  He denies abdominal pain, nausea, or vomiting  Physical Exam:   Temp:  [97 6 °F (36 4 °C)-98 7 °F (37 1 °C)] 98 7 °F (37 1 °C)  HR:  [79-87] 83  Resp:  [18-20] 18  BP: (125-138)/(64-67) 138/64    Gen:  Well-developed, well-nourished, in no distress  Neck:  Supple  No lymphadenopathy, goiter, or bruit  Heart:  Regular rhythm  No murmur, gallop, or rub  Lungs:  Clear to auscultation percussion  No wheezing, rales, or rhonchi  Abd:  Soft with active bowel sounds  No mass, tenderness, organomegaly  Extremities:  No clubbing, cyanosis, or edema  Left leg is wrapped  Neuro:  Alert and conversant  No focal sign  Skin:  Warm and dry  LABS:   No new labs  Assessment/Plan:  1  Left leg cellulitis, resolved  2  Cutaneous malignancy, 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  3  Type 2 diabetes, well controlled on diet  4  Hypertension  5  Acute kidney injury superimposed on chronic kidney disease stage 2, resolved  6  Inability to make informed medical decisions  7  Asymptomatic COVID-19    The patient is doing quite well  He appears to be asymptomatic with respect to [place]  His chronic medical issues are well controlled  He will be unable to go to rehab until July 31st   Definitive surgical plan will be decided upon based on the patient's final path report  I discussed this information with the patient's daughter Jose C Hodges      VTE Pharmacologic Prophylaxis: Heparin  VTE Mechanical Prophylaxis: reason for no mechanical VTE prophylaxis Leg wound

## 2022-07-22 NOTE — PLAN OF CARE
Problem: Potential for Falls  Goal: Patient will remain free of falls  Description: INTERVENTIONS:  - Educate patient/family on patient safety including physical limitations  - Instruct patient to call for assistance with activity   - Consult OT/PT to assist with strengthening/mobility   - Keep Call bell within reach  - Keep bed low and locked with side rails adjusted as appropriate  - Keep care items and personal belongings within reach  - Initiate and maintain comfort rounds  - Make Fall Risk Sign visible to staff  - Offer Toileting every 2 Hours, in advance of need  - Initiate/Maintain bed/chair alarm  - Obtain necessary fall risk management equipment:bed/chair alarm, call bell, walker, gripper socks, rounds  - Apply yellow socks and bracelet for high fall risk patients  - Consider moving patient to room near nurses station  Outcome: Progressing     Problem: Prexisting or High Potential for Compromised Skin Integrity  Goal: Skin integrity is maintained or improved  Description: INTERVENTIONS:  - Identify patients at risk for skin breakdown  - Assess and monitor skin integrity  - Assess and monitor nutrition and hydration status  - Monitor labs   - Assess for incontinence   - Instruct patient to turn and repostion every 2 hours while in bed and weight shift every 15 minutes while in chair  - Assist with mobility/ambulation  - Relieve pressure over bony prominences  - Avoid friction and shearing  - Provide appropriate hygiene as needed including keeping skin clean and dry  - Evaluate need for skin moisturizer/barrier cream  - Collaborate with interdisciplinary team   - Patient/family teaching  - Consider wound care consult   Outcome: Progressing     Problem: MOBILITY - ADULT  Goal: Maintain or return to baseline ADL function  Description: INTERVENTIONS:  -  Assess patient's ability to carry out ADLs; assess patient's baseline for ADL function and identify physical deficits which impact ability to perform ADLs (bathing, care of mouth/teeth, toileting, grooming, dressing, etc )  - Assess/evaluate cause of self-care deficits   - Assess range of motion  - Assess patient's mobility; develop plan if impaired  - Assess patient's need for assistive devices and provide as appropriate  - Encourage maximum independence but intervene and supervise when necessary  - Involve family in performance of ADLs  - Assess for home care needs following discharge   - Consider OT consult to assist with ADL evaluation and planning for discharge  - Provide patient education as appropriate  Outcome: Progressing  Goal: Maintains/Returns to pre admission functional level  Description: INTERVENTIONS:  - Perform BMAT or MOVE assessment daily    - Set and communicate daily mobility goal to care team and patient/family/caregiver  - Collaborate with rehabilitation services on mobility goals if consulted  - Perform Range of Motion  times a day  - Reposition patient every  hours    - Dangle patient  times a day  - Stand patient  times a day  - Ambulate patient  times a day  - Out of bed to chair  times a day   - Out of bed for meals  times a day  - Out of bed for toileting  - Record patient progress and toleration of activity level   Outcome: Progressing     Problem: INFECTION - ADULT  Goal: Absence or prevention of progression during hospitalization  Description: INTERVENTIONS:  - Assess and monitor for signs and symptoms of infection  - Monitor lab/diagnostic results  - Monitor all insertion sites, i e  indwelling lines, tubes, and drains  - Monitor endotracheal if appropriate and nasal secretions for changes in amount and color  - [place] appropriate cooling/warming therapies per order  - Administer medications as ordered  - Instruct and encourage patient and family to use good hand hygiene technique  - Identify and instruct in appropriate isolation precautions for identified infection/condition  Outcome: Progressing     Problem: DISCHARGE PLANNING  Goal: Discharge to home or other facility with appropriate resources  Description: INTERVENTIONS:  - Identify barriers to discharge w/patient and caregiver  - Arrange for needed discharge resources and transportation as appropriate  - Identify discharge learning needs (meds, wound care, etc )  - Arrange for interpretive services to assist at discharge as needed  - Refer to Case Management Department for coordinating discharge planning if the patient needs post-hospital services based on physician/advanced practitioner order or complex needs related to functional status, cognitive ability, or social support system  Outcome: Progressing     Problem: PAIN - ADULT  Goal: Verbalizes/displays adequate comfort level or baseline comfort level  Description: Interventions:  - Encourage patient to monitor pain and request assistance  - Assess pain using appropriate pain scale  - Administer analgesics based on type and severity of pain and evaluate response  - Implement non-pharmacological measures as appropriate and evaluate response  - Consider cultural and social influences on pain and pain management  - Notify physician/advanced practitioner if interventions unsuccessful or patient reports new pain  Outcome: Progressing     Problem: SKIN/TISSUE INTEGRITY - ADULT  Goal: Incision(s), wounds(s) or drain site(s) healing without S/S of infection  Description: INTERVENTIONS  - Assess and document dressing, incision, wound bed, drain sites and surrounding tissue  - Provide patient and family education  - Perform skin care/dressing changes every   Outcome: Progressing     Problem: CONFUSION/THOUGHT DISTURBANCE  Goal: Thought disturbances (confusion, delirium, depression, dementia or psychosis) are managed to maintain or return to baseline mental status and functional level  Description: INTERVENTIONS:  - Assess for possible contributors to  thought disturbance, including but not limited to medications, infection, impaired vision or hearing, underlying metabolic abnormalities, dehydration, respiratory compromise,  psychiatric diagnoses and notify attending PHYSICAN/AP  - Monitor and intervene to maintain adequate nutrition, hydration, elimination, sleep and activity  - Decrease environmental stimuli, including noise as appropriate  - Provide frequent contacts to provide refocusing, direction and reassurance as needed  Approach patient calmly with eye contact and at their level    - [place] high risk fall precautions, aspiration precautions and other safety measures, as indicated  - If delirium suspected, notify physician/AP of change in condition and request immediate in-person evaluation  - Pursue consults as appropriate including Geriatric (campus dependent), OT for cognitive evaluation/activity planning, psychiatric, pastoral care, etc   Outcome: Progressing

## 2022-07-23 LAB
ANION GAP SERPL CALCULATED.3IONS-SCNC: 11 MMOL/L (ref 4–13)
BASOPHILS # BLD AUTO: 0.03 THOUSANDS/ΜL (ref 0–0.1)
BASOPHILS NFR BLD AUTO: 0 % (ref 0–1)
BUN SERPL-MCNC: 12 MG/DL (ref 5–25)
CALCIUM SERPL-MCNC: 8.3 MG/DL (ref 8.3–10.1)
CHLORIDE SERPL-SCNC: 101 MMOL/L (ref 96–108)
CO2 SERPL-SCNC: 26 MMOL/L (ref 21–32)
CREAT SERPL-MCNC: 0.84 MG/DL (ref 0.6–1.3)
EOSINOPHIL # BLD AUTO: 0.13 THOUSAND/ΜL (ref 0–0.61)
EOSINOPHIL NFR BLD AUTO: 2 % (ref 0–6)
ERYTHROCYTE [DISTWIDTH] IN BLOOD BY AUTOMATED COUNT: 14.1 % (ref 11.6–15.1)
GFR SERPL CREATININE-BSD FRML MDRD: 79 ML/MIN/1.73SQ M
GLUCOSE SERPL-MCNC: 103 MG/DL (ref 65–140)
HCT VFR BLD AUTO: 47.6 % (ref 36.5–49.3)
HGB BLD-MCNC: 15.6 G/DL (ref 12–17)
IMM GRANULOCYTES # BLD AUTO: 0.03 THOUSAND/UL (ref 0–0.2)
IMM GRANULOCYTES NFR BLD AUTO: 0 % (ref 0–2)
LYMPHOCYTES # BLD AUTO: 2.18 THOUSANDS/ΜL (ref 0.6–4.47)
LYMPHOCYTES NFR BLD AUTO: 31 % (ref 14–44)
MCH RBC QN AUTO: 29.3 PG (ref 26.8–34.3)
MCHC RBC AUTO-ENTMCNC: 32.8 G/DL (ref 31.4–37.4)
MCV RBC AUTO: 90 FL (ref 82–98)
MONOCYTES # BLD AUTO: 0.71 THOUSAND/ΜL (ref 0.17–1.22)
MONOCYTES NFR BLD AUTO: 10 % (ref 4–12)
NEUTROPHILS # BLD AUTO: 3.93 THOUSANDS/ΜL (ref 1.85–7.62)
NEUTS SEG NFR BLD AUTO: 57 % (ref 43–75)
NRBC BLD AUTO-RTO: 0 /100 WBCS
PLATELET # BLD AUTO: 212 THOUSANDS/UL (ref 149–390)
PMV BLD AUTO: 10.4 FL (ref 8.9–12.7)
POTASSIUM SERPL-SCNC: 3.4 MMOL/L (ref 3.5–5.3)
RBC # BLD AUTO: 5.32 MILLION/UL (ref 3.88–5.62)
SODIUM SERPL-SCNC: 138 MMOL/L (ref 135–147)
WBC # BLD AUTO: 7.01 THOUSAND/UL (ref 4.31–10.16)

## 2022-07-23 PROCEDURE — 80048 BASIC METABOLIC PNL TOTAL CA: CPT | Performed by: INTERNAL MEDICINE

## 2022-07-23 PROCEDURE — 85025 COMPLETE CBC W/AUTO DIFF WBC: CPT | Performed by: INTERNAL MEDICINE

## 2022-07-23 PROCEDURE — 99232 SBSQ HOSP IP/OBS MODERATE 35: CPT | Performed by: INTERNAL MEDICINE

## 2022-07-23 RX ORDER — POTASSIUM CHLORIDE 20 MEQ/1
20 TABLET, EXTENDED RELEASE ORAL DAILY
Status: DISCONTINUED | OUTPATIENT
Start: 2022-07-23 | End: 2022-08-05 | Stop reason: HOSPADM

## 2022-07-23 RX ADMIN — ASPIRIN 81 MG: 81 TABLET, COATED ORAL at 08:13

## 2022-07-23 RX ADMIN — POTASSIUM CHLORIDE 20 MEQ: 1500 TABLET, EXTENDED RELEASE ORAL at 09:07

## 2022-07-23 RX ADMIN — AMLODIPINE BESYLATE 10 MG: 10 TABLET ORAL at 08:13

## 2022-07-23 RX ADMIN — HEPARIN SODIUM 5000 UNITS: 5000 INJECTION INTRAVENOUS; SUBCUTANEOUS at 13:07

## 2022-07-23 RX ADMIN — HEPARIN SODIUM 5000 UNITS: 5000 INJECTION INTRAVENOUS; SUBCUTANEOUS at 21:01

## 2022-07-23 RX ADMIN — HEPARIN SODIUM 5000 UNITS: 5000 INJECTION INTRAVENOUS; SUBCUTANEOUS at 05:24

## 2022-07-23 NOTE — PROGRESS NOTES
Progress Note - Dcik Berkowitz 82 y o  male MRN: [id number]    Unit/Bed#: [facility] 2 -01 Encounter: [id number]      Subjective: The patient feels reasonably well  He slept okay  He is eating well  He has no chest pain, shortness of breath, abdominal pain, nausea, or vomiting  Physical Exam:   Temp:  [98 2 °F (36 8 °C)-98 8 °F (37 1 °C)] 98 2 °F (36 8 °C)  HR:  [81-84] 82  Resp:  [16-20] 16  BP: (115-137)/(61-65) 115/61    Gen:  Well-developed, well-nourished, in no distress  Neck:  Supple  No lymphadenopathy, goiter, or bruit  Heart:  Regular rhythm  I heard no murmur, gallop, or rub  Lungs:  Clear to auscultation and percussion  No wheezing, rales, or rhonchi  Abd:  Soft with active bowel sounds  No mass, tenderness, or organomegaly  Extremities:  No clubbing, cyanosis, or edema  No calf tenderness  The left leg is wrapped  Neuro:  Alert and conversant  No focal sign  Skin:  Warm and dry  LABS:   CBC:   Lab Results   Component Value Date    WBC 7 01 07/23/2022    HGB 15 6 07/23/2022    HCT 47 6 07/23/2022    MCV 90 07/23/2022     07/23/2022    MCH 29 3 07/23/2022    MCHC 32 8 07/23/2022    RDW 14 1 07/23/2022    MPV 10 4 07/23/2022    NRBC 0 07/23/2022   , CMP:   Lab Results   Component Value Date    SODIUM 138 07/23/2022    K 3 4 (L) 07/23/2022     07/23/2022    CO2 26 07/23/2022    BUN 12 07/23/2022    CREATININE 0 84 07/23/2022    CALCIUM 8 3 07/23/2022    EGFR 79 07/23/2022           Patient Active Problem List   Diagnosis    [facility]) [facility] [address] [facility]    Cellulitis    Leg mass    Type 2 diabetes mellitus with hyperglycemia, without long-term current use of insulin ([facility] )    Hypertension    Encounter for competency evaluation       Assessment/Plan:  1  Left leg cellulitis, resolved  2  Cutaneous malignancy, left leg, final path pending  3  Type 2 diabetes, controlled on diet  4  Hypertension  5   Acute kidney injury superimposed on chronic kidney disease stage 2, resolved  6  Asymptomatic COVID-19  7  Mild hypokalemia  8  Inability to make informed medical decisions    The patient has remained stable  COVID-19 appears to be asymptomatic  His potassium is mildly low and will be repleted  His blood pressure, glucose, and kidney function are stable  When the final pathology is available on his leg biopsy, definitive surgical therapy will be planned    The patient will remain in the hospital in quarantine until July 31st       VTE Pharmacologic Prophylaxis: Heparin  VTE Mechanical Prophylaxis:  None because of leg wound

## 2022-07-23 NOTE — PLAN OF CARE
Problem: Potential for Falls  Goal: Patient will remain free of falls  Description: INTERVENTIONS:  - Educate patient/family on patient safety including physical limitations  - Instruct patient to call for assistance with activity   - Consult OT/PT to assist with strengthening/mobility   - Keep Call bell within reach  - Keep bed low and locked with side rails adjusted as appropriate  - Keep care items and personal belongings within reach  - Initiate and maintain comfort rounds  - Make Fall Risk Sign visible to staff  - Offer Toileting every 2 Hours, in advance of need  - Initiate/Maintain bed/chair alarm  - Obtain necessary fall risk management equipment:bed/chair alarm, call bell, walker, gripper socks, rounds  - Apply yellow socks and bracelet for high fall risk patients  - Consider moving patient to room near nurses station  Outcome: Progressing     Problem: MOBILITY - ADULT  Goal: Maintain or return to baseline ADL function  Description: INTERVENTIONS:  -  Assess patient's ability to carry out ADLs; assess patient's baseline for ADL function and identify physical deficits which impact ability to perform ADLs (bathing, care of mouth/teeth, toileting, grooming, dressing, etc )  - Assess/evaluate cause of self-care deficits   - Assess range of motion  - Assess patient's mobility; develop plan if impaired  - Assess patient's need for assistive devices and provide as appropriate  - Encourage maximum independence but intervene and supervise when necessary  - Involve family in performance of ADLs  - Assess for home care needs following discharge   - Consider OT consult to assist with ADL evaluation and planning for discharge  - Provide patient education as appropriate  Outcome: Progressing  Goal: Maintains/Returns to pre admission functional level  Description: INTERVENTIONS:  - Perform BMAT or MOVE assessment daily    - Set and communicate daily mobility goal to care team and patient/family/caregiver     - Collaborate with rehabilitation services on mobility goals if consulted  - Perform Range of Motion  times a day  - Reposition patient every  hours    - Dangle patient  times a day  - Stand patient  times a day  - Ambulate patient  times a day  - Out of bed to chair  times a day   - Out of bed for meals  times a day  - Out of bed for toileting  - Record patient progress and toleration of activity level   Outcome: Progressing     Problem: INFECTION - ADULT  Goal: Absence or prevention of progression during hospitalization  Description: INTERVENTIONS:  - Assess and monitor for signs and symptoms of infection  - Monitor lab/diagnostic results  - Monitor all insertion sites, i e  indwelling lines, tubes, and drains  - Monitor endotracheal if appropriate and nasal secretions for changes in amount and color  - [place] appropriate cooling/warming therapies per order  - Administer medications as ordered  - Instruct and encourage patient and family to use good hand hygiene technique  - Identify and instruct in appropriate isolation precautions for identified infection/condition  Outcome: Progressing     Problem: DISCHARGE PLANNING  Goal: Discharge to home or other facility with appropriate resources  Description: INTERVENTIONS:  - Identify barriers to discharge w/patient and caregiver  - Arrange for needed discharge resources and transportation as appropriate  - Identify discharge learning needs (meds, wound care, etc )  - Arrange for interpretive services to assist at discharge as needed  - Refer to Case Management Department for coordinating discharge planning if the patient needs post-hospital services based on physician/advanced practitioner order or complex needs related to functional status, cognitive ability, or social support system  Outcome: Progressing     Problem: PAIN - ADULT  Goal: Verbalizes/displays adequate comfort level or baseline comfort level  Description: Interventions:  - Encourage patient to monitor pain and request assistance  - Assess pain using appropriate pain scale  - Administer analgesics based on type and severity of pain and evaluate response  - Implement non-pharmacological measures as appropriate and evaluate response  - Consider cultural and social influences on pain and pain management  - Notify physician/advanced practitioner if interventions unsuccessful or patient reports new pain  Outcome: Progressing     Problem: SKIN/TISSUE INTEGRITY - ADULT  Goal: Incision(s), wounds(s) or drain site(s) healing without S/S of infection  Description: INTERVENTIONS  - Assess and document dressing, incision, wound bed, drain sites and surrounding tissue  - Provide patient and family education  - Perform skin care/dressing changes every   Outcome: Progressing     Problem: CONFUSION/THOUGHT DISTURBANCE  Goal: Thought disturbances (confusion, delirium, depression, dementia or psychosis) are managed to maintain or return to baseline mental status and functional level  Description: INTERVENTIONS:  - Assess for possible contributors to  thought disturbance, including but not limited to medications, infection, impaired vision or hearing, underlying metabolic abnormalities, dehydration, respiratory compromise,  psychiatric diagnoses and notify attending PHYSICAN/AP  - Monitor and intervene to maintain adequate nutrition, hydration, elimination, sleep and activity  - Decrease environmental stimuli, including noise as appropriate  - Provide frequent contacts to provide refocusing, direction and reassurance as needed  Approach patient calmly with eye contact and at their level    - [place] high risk fall precautions, aspiration precautions and other safety measures, as indicated  - If delirium suspected, notify physician/AP of change in condition and request immediate in-person evaluation  - Pursue consults as appropriate including Geriatric (campus dependent), OT for cognitive evaluation/activity planning, psychiatric, pastoral care, etc   Outcome: Progressing

## 2022-07-24 PROCEDURE — 99232 SBSQ HOSP IP/OBS MODERATE 35: CPT | Performed by: INTERNAL MEDICINE

## 2022-07-24 RX ADMIN — POTASSIUM CHLORIDE 20 MEQ: 1500 TABLET, EXTENDED RELEASE ORAL at 08:12

## 2022-07-24 RX ADMIN — HEPARIN SODIUM 5000 UNITS: 5000 INJECTION INTRAVENOUS; SUBCUTANEOUS at 13:08

## 2022-07-24 RX ADMIN — HEPARIN SODIUM 5000 UNITS: 5000 INJECTION INTRAVENOUS; SUBCUTANEOUS at 05:33

## 2022-07-24 RX ADMIN — ACETAMINOPHEN 650 MG: 325 TABLET, FILM COATED ORAL at 21:34

## 2022-07-24 RX ADMIN — ASPIRIN 81 MG: 81 TABLET, COATED ORAL at 08:12

## 2022-07-24 RX ADMIN — HEPARIN SODIUM 5000 UNITS: 5000 INJECTION INTRAVENOUS; SUBCUTANEOUS at 21:34

## 2022-07-24 RX ADMIN — OXYCODONE 2.5 MG: 5 TABLET ORAL at 13:07

## 2022-07-24 RX ADMIN — AMLODIPINE BESYLATE 10 MG: 10 TABLET ORAL at 08:12

## 2022-07-24 NOTE — PLAN OF CARE
Problem: Potential for Falls  Goal: Patient will remain free of falls  Description: INTERVENTIONS:  - Educate patient/family on patient safety including physical limitations  - Instruct patient to call for assistance with activity   - Consult OT/PT to assist with strengthening/mobility   - Keep Call bell within reach  - Keep bed low and locked with side rails adjusted as appropriate  - Keep care items and personal belongings within reach  - Initiate and maintain comfort rounds  - Make Fall Risk Sign visible to staff  - Offer Toileting every 2 Hours, in advance of need  - Initiate/Maintain bed/chair alarm  - Obtain necessary fall risk management equipment:bed/chair alarm, call bell, walker, gripper socks, rounds  - Apply yellow socks and bracelet for high fall risk patients  - Consider moving patient to room near nurses station  Outcome: Progressing     Problem: Prexisting or High Potential for Compromised Skin Integrity  Goal: Skin integrity is maintained or improved  Description: INTERVENTIONS:  - Identify patients at risk for skin breakdown  - Assess and monitor skin integrity  - Assess and monitor nutrition and hydration status  - Monitor labs   - Assess for incontinence   - Instruct patient to turn and repostion every 2 hours while in bed and weight shift every 15 minutes while in chair  - Assist with mobility/ambulation  - Relieve pressure over bony prominences  - Avoid friction and shearing  - Provide appropriate hygiene as needed including keeping skin clean and dry  - Evaluate need for skin moisturizer/barrier cream  - Collaborate with interdisciplinary team   - Patient/family teaching  - Consider wound care consult   Outcome: Progressing     Problem: MOBILITY - ADULT  Goal: Maintain or return to baseline ADL function  Description: INTERVENTIONS:  -  Assess patient's ability to carry out ADLs; assess patient's baseline for ADL function and identify physical deficits which impact ability to perform ADLs (bathing, care of mouth/teeth, toileting, grooming, dressing, etc )  - Assess/evaluate cause of self-care deficits   - Assess range of motion  - Assess patient's mobility; develop plan if impaired  - Assess patient's need for assistive devices and provide as appropriate  - Encourage maximum independence but intervene and supervise when necessary  - Involve family in performance of ADLs  - Assess for home care needs following discharge   - Consider OT consult to assist with ADL evaluation and planning for discharge  - Provide patient education as appropriate  Outcome: Progressing  Goal: Maintains/Returns to pre admission functional level  Description: INTERVENTIONS:  - Perform BMAT or MOVE assessment daily    - Set and communicate daily mobility goal to care team and patient/family/caregiver  - Collaborate with rehabilitation services on mobility goals if consulted  - Perform Range of Motion  times a day  - Reposition patient every  hours    - Dangle patient  times a day  - Stand patient  times a day  - Ambulate patient  times a day  - Out of bed to chair  times a day   - Out of bed for meals  times a day  - Out of bed for toileting  - Record patient progress and toleration of activity level   Outcome: Progressing     Problem: INFECTION - ADULT  Goal: Absence or prevention of progression during hospitalization  Description: INTERVENTIONS:  - Assess and monitor for signs and symptoms of infection  - Monitor lab/diagnostic results  - Monitor all insertion sites, i e  indwelling lines, tubes, and drains  - Monitor endotracheal if appropriate and nasal secretions for changes in amount and color  - [place] appropriate cooling/warming therapies per order  - Administer medications as ordered  - Instruct and encourage patient and family to use good hand hygiene technique  - Identify and instruct in appropriate isolation precautions for identified infection/condition  Outcome: Progressing     Problem: DISCHARGE PLANNING  Goal: Discharge to home or other facility with appropriate resources  Description: INTERVENTIONS:  - Identify barriers to discharge w/patient and caregiver  - Arrange for needed discharge resources and transportation as appropriate  - Identify discharge learning needs (meds, wound care, etc )  - Arrange for interpretive services to assist at discharge as needed  - Refer to Case Management Department for coordinating discharge planning if the patient needs post-hospital services based on physician/advanced practitioner order or complex needs related to functional status, cognitive ability, or social support system  Outcome: Progressing     Problem: PAIN - ADULT  Goal: Verbalizes/displays adequate comfort level or baseline comfort level  Description: Interventions:  - Encourage patient to monitor pain and request assistance  - Assess pain using appropriate pain scale  - Administer analgesics based on type and severity of pain and evaluate response  - Implement non-pharmacological measures as appropriate and evaluate response  - Consider cultural and social influences on pain and pain management  - Notify physician/advanced practitioner if interventions unsuccessful or patient reports new pain  Outcome: Progressing     Problem: SKIN/TISSUE INTEGRITY - ADULT  Goal: Incision(s), wounds(s) or drain site(s) healing without S/S of infection  Description: INTERVENTIONS  - Assess and document dressing, incision, wound bed, drain sites and surrounding tissue  - Provide patient and family education  - Perform skin care/dressing changes every   Outcome: Progressing     Problem: CONFUSION/THOUGHT DISTURBANCE  Goal: Thought disturbances (confusion, delirium, depression, dementia or psychosis) are managed to maintain or return to baseline mental status and functional level  Description: INTERVENTIONS:  - Assess for possible contributors to  thought disturbance, including but not limited to medications, infection, impaired vision or hearing, underlying metabolic abnormalities, dehydration, respiratory compromise,  psychiatric diagnoses and notify attending PHYSICAN/AP  - Monitor and intervene to maintain adequate nutrition, hydration, elimination, sleep and activity  - Decrease environmental stimuli, including noise as appropriate  - Provide frequent contacts to provide refocusing, direction and reassurance as needed  Approach patient calmly with eye contact and at their level    - [place] high risk fall precautions, aspiration precautions and other safety measures, as indicated  - If delirium suspected, notify physician/AP of change in condition and request immediate in-person evaluation  - Pursue consults as appropriate including Geriatric (campus dependent), OT for cognitive evaluation/activity planning, psychiatric, pastoral care, etc   Outcome: Progressing

## 2022-07-24 NOTE — PROGRESS NOTES
Progress Note - Brittany Contreras 82 y o  male MRN: [id number]    Unit/Bed#: [facility] 68 2 -01 Encounter: [id number]      Subjective: The patient feels reasonably well  He denies chest pain, shortness of breath, palpitations, or dizziness  He has no abdominal pain, nausea, or vomiting  His only complaint is that I turned off his television so that I could properly examine him  Physical Exam:   Temp:  [98 2 °F (36 8 °C)-98 5 °F (36 9 °C)] 98 5 °F (36 9 °C)  HR:  [85-87] 87  Resp:  [16-18] 18  BP: (141-159)/(68-87) 141/74    Gen:  Well-developed, well-nourished, in no distress  Neck:  Supple  No lymphadenopathy, goiter, or bruit  Heart:  Regular rhythm  I heard no murmur, gallop, or rub  Lungs:  Clear to auscultation percussion  No wheezing, rales, or rhonchi  Abd:  Soft with active bowel sounds  No mass, tenderness, or organomegaly  Extremities:  No clubbing, cyanosis, or edema  No calf tenderness  Neuro:  Alert and conversant  No focal sign  Skin:  Warm and dry  LABS:       Assessment/Plan:  1  Left leg cellulitis, resolved  2  Cutaneous malignancy, left leg, final pathology pending   3  Type 2 diabetes, controlled on diet  4  Hypertension  5  Acute kidney injury superimposed on chronic kidney disease stage 2, resolved  6  Asymptomatic COVID-19  7  Mild hypokalemia  8  Ability to make informed medical decisions    The patient remains clinically stable  His cellulitis has resolved  He is asymptomatic with respect to COVID-19  Final pathological report on his leg biopsy is pending  Definitive surgical planning is dependent on this result    The patient will remain in the hospital in quarantine until July 31st       VTE Pharmacologic Prophylaxis: Heparin  VTE Mechanical Prophylaxis: sequential compression device

## 2022-07-25 PROBLEM — U07.1 COVID-19: Status: ACTIVE | Noted: 2022-01-01

## 2022-07-25 PROBLEM — C43.72: Status: ACTIVE | Noted: 2022-07-25

## 2022-07-25 LAB
GLUCOSE SERPL-MCNC: 104 MG/DL (ref 65–140)
GLUCOSE SERPL-MCNC: 124 MG/DL (ref 65–140)
GLUCOSE SERPL-MCNC: 143 MG/DL (ref 65–140)

## 2022-07-25 PROCEDURE — 82948 REAGENT STRIP/BLOOD GLUCOSE: CPT

## 2022-07-25 PROCEDURE — 99232 SBSQ HOSP IP/OBS MODERATE 35: CPT | Performed by: HOSPITALIST

## 2022-07-25 RX ORDER — BUTALBITAL, ACETAMINOPHEN AND CAFFEINE 50; 325; 40 MG/1; MG/1; MG/1
1 TABLET ORAL EVERY 4 HOURS PRN
Status: DISCONTINUED | OUTPATIENT
Start: 2022-07-25 | End: 2022-08-05 | Stop reason: HOSPADM

## 2022-07-25 RX ADMIN — ACETAMINOPHEN 650 MG: 325 TABLET, FILM COATED ORAL at 14:51

## 2022-07-25 RX ADMIN — AMLODIPINE BESYLATE 10 MG: 10 TABLET ORAL at 09:12

## 2022-07-25 RX ADMIN — HEPARIN SODIUM 5000 UNITS: 5000 INJECTION INTRAVENOUS; SUBCUTANEOUS at 06:00

## 2022-07-25 RX ADMIN — HEPARIN SODIUM 5000 UNITS: 5000 INJECTION INTRAVENOUS; SUBCUTANEOUS at 13:41

## 2022-07-25 RX ADMIN — BUTALBITAL, ACETAMINOPHEN AND CAFFEINE 1 TABLET: 50; 325; 40 TABLET ORAL at 16:11

## 2022-07-25 RX ADMIN — HEPARIN SODIUM 5000 UNITS: 5000 INJECTION INTRAVENOUS; SUBCUTANEOUS at 21:27

## 2022-07-25 RX ADMIN — ASPIRIN 81 MG: 81 TABLET, COATED ORAL at 09:12

## 2022-07-25 RX ADMIN — POTASSIUM CHLORIDE 20 MEQ: 1500 TABLET, EXTENDED RELEASE ORAL at 09:12

## 2022-07-25 RX ADMIN — ALUMINA, MAGNESIA, AND SIMETHICONE ORAL SUSPENSION REGULAR STRENGTH 10 ML: 1200; 1200; 120 SUSPENSION ORAL at 14:51

## 2022-07-25 NOTE — WOUND OSTOMY CARE
Progress Note - Wound   Angel Broderick 82 y o  male MRN: [id number]  Unit/Bed#: [facility] 68 2 -01 Encounter: [id number]        Assessment:   Patient seen for wound care follow-up  He is agreeable to assessment  Able to turn in bed independently  Occasionally incontinent of urine  Buttocks and sacrum intact with blanchable erythema  Bilateral heels intact  Lower legs with scaling and chronic venous changes/hyperpigmentation  1   Left lower extremity wound--purple/black mass to medial/posterior aspect of leg  Small open area remains on mass with scant bloody drainage  Skin around mass is now pink, healed, dry, scaly  No induration  Less painful per patient  Biopsy from 7/13/22 is pending  See flowsheet for wound details  Wound Care Plan:   1-Hydraguard lotion to right lower leg, bilateral buttocks, sacrum, and heels three times daily and as needed  2-Elevate heels off of bed/chair surface to offload pressure  3-Offloading air cushion in chair when out of bed  4-Moisturize skin daily with skin nourishing cream   5-Turn/reposition every 2 hours while in bed and weight shift frequently while in chair for pressure re-distribution on skin    6-Left leg--cleanse with normal saline, pat dry   Apply Hydraguard lotion to intact skin on lower leg  Apply adaptic to open areas/mass   Cover with ABDs   Wrap with rolled gauze   Change dressing every other day      Wound care team to follow  Cornelious Stalling of care reviewed with primary RN  Wound 07/09/22 Leg Posterior; Left (Active)   Wound Image   07/25/22 1528   Wound Description Beefy red 07/25/22 1528   [place]-wound Assessment Intact;Fragile 07/25/22 1528   Wound Length (cm) 0 6 cm 07/25/22 1528   Wound Width (cm) 0 5 cm 07/25/22 1528   Wound Depth (cm) 0 1 cm 07/25/22 1528   Wound Surface Area (cm^2) 0 3 cm^2 07/25/22 1528   Wound Volume (cm^3) 0 03 cm^3 07/25/22 1528   Calculated Wound Volume (cm^3) 0 03 cm^3 07/25/22 1528   Change in Wound Size % 99 83 07/25/22 1528   Drainage Amount Scant 07/25/22 1528   Drainage Description Bloody 07/25/22 1528   Treatments Cleansed 07/25/22 1528   Dressing Non adherent;ABD;Gauze 07/25/22 1528   Dressing Changed Changed 07/25/22 1528   Patient Tolerance Tolerated well 07/25/22 1528   Dressing Status Clean;Dry; Intact 07/25/22 1814 Jaylen PAULN, RN, [employer]

## 2022-07-25 NOTE — PLAN OF CARE
Problem: Potential for Falls  Goal: Patient will remain free of falls  Description: INTERVENTIONS:  - Educate patient/family on patient safety including physical limitations  - Instruct patient to call for assistance with activity   - Consult OT/PT to assist with strengthening/mobility   - Keep Call bell within reach  - Keep bed low and locked with side rails adjusted as appropriate  - Keep care items and personal belongings within reach  - Initiate and maintain comfort rounds  - Make Fall Risk Sign visible to staff  - Offer Toileting every 2 Hours, in advance of need  - Initiate/Maintain bed/chair alarm  - Obtain necessary fall risk management equipment:bed/chair alarm, call bell, walker, gripper socks, rounds  - Apply yellow socks and bracelet for high fall risk patients  - Consider moving patient to room near nurses station  Outcome: Progressing     Problem: Prexisting or High Potential for Compromised Skin Integrity  Goal: Skin integrity is maintained or improved  Description: INTERVENTIONS:  - Identify patients at risk for skin breakdown  - Assess and monitor skin integrity  - Assess and monitor nutrition and hydration status  - Monitor labs   - Assess for incontinence   - Instruct patient to turn and repostion every 2 hours while in bed and weight shift every 15 minutes while in chair  - Assist with mobility/ambulation  - Relieve pressure over bony prominences  - Avoid friction and shearing  - Provide appropriate hygiene as needed including keeping skin clean and dry  - Evaluate need for skin moisturizer/barrier cream  - Collaborate with interdisciplinary team   - Patient/family teaching  - Consider wound care consult   Outcome: Progressing     Problem: MOBILITY - ADULT  Goal: Maintain or return to baseline ADL function  Description: INTERVENTIONS:  -  Assess patient's ability to carry out ADLs; assess patient's baseline for ADL function and identify physical deficits which impact ability to perform ADLs (bathing, care of mouth/teeth, toileting, grooming, dressing, etc )  - Assess/evaluate cause of self-care deficits   - Assess range of motion  - Assess patient's mobility; develop plan if impaired  - Assess patient's need for assistive devices and provide as appropriate  - Encourage maximum independence but intervene and supervise when necessary  - Involve family in performance of ADLs  - Assess for home care needs following discharge   - Consider OT consult to assist with ADL evaluation and planning for discharge  - Provide patient education as appropriate  Outcome: Progressing  Goal: Maintains/Returns to pre admission functional level  Description: INTERVENTIONS:  - Perform BMAT or MOVE assessment daily    - Set and communicate daily mobility goal to care team and patient/family/caregiver  - Collaborate with rehabilitation services on mobility goals if consulted  - Perform Range of Motion 3 times a day  - Reposition patient every 2 hours    - Dangle patient 3 times a day  - Stand patient 3 times a day  - Ambulate patient 3 times a day  - Out of bed to chair 3 times a day   - Out of bed for meals 3 times a day  - Out of bed for toileting  - Record patient progress and toleration of activity level   Outcome: Progressing     Problem: INFECTION - ADULT  Goal: Absence or prevention of progression during hospitalization  Description: INTERVENTIONS:  - Assess and monitor for signs and symptoms of infection  - Monitor lab/diagnostic results  - Monitor all insertion sites, i e  indwelling lines, tubes, and drains  - Monitor endotracheal if appropriate and nasal secretions for changes in amount and color  - [place] appropriate cooling/warming therapies per order  - Administer medications as ordered  - Instruct and encourage patient and family to use good hand hygiene technique  - Identify and instruct in appropriate isolation precautions for identified infection/condition  Outcome: Progressing     Problem: DISCHARGE PLANNING  Goal: Discharge to home or other facility with appropriate resources  Description: INTERVENTIONS:  - Identify barriers to discharge w/patient and caregiver  - Arrange for needed discharge resources and transportation as appropriate  - Identify discharge learning needs (meds, wound care, etc )  - Arrange for interpretive services to assist at discharge as needed  - Refer to Case Management Department for coordinating discharge planning if the patient needs post-hospital services based on physician/advanced practitioner order or complex needs related to functional status, cognitive ability, or social support system  Outcome: Progressing     Problem: SKIN/TISSUE INTEGRITY - ADULT  Goal: Incision(s), wounds(s) or drain site(s) healing without S/S of infection  Description: INTERVENTIONS  - Assess and document dressing, incision, wound bed, drain sites and surrounding tissue  - Provide patient and family education  - Perform skin care/dressing changes   Outcome: Progressing     Problem: PAIN - ADULT  Goal: Verbalizes/displays adequate comfort level or baseline comfort level  Description: Interventions:  - Encourage patient to monitor pain and request assistance  - Assess pain using appropriate pain scale  - Administer analgesics based on type and severity of pain and evaluate response  - Implement non-pharmacological measures as appropriate and evaluate response  - Consider cultural and social influences on pain and pain management  - Notify physician/advanced practitioner if interventions unsuccessful or patient reports new pain  Outcome: Progressing     Problem: CONFUSION/THOUGHT DISTURBANCE  Goal: Thought disturbances (confusion, delirium, depression, dementia or psychosis) are managed to maintain or return to baseline mental status and functional level  Description: INTERVENTIONS:  - Assess for possible contributors to  thought disturbance, including but not limited to medications, infection, impaired vision or hearing, underlying metabolic abnormalities, dehydration, respiratory compromise,  psychiatric diagnoses and notify attending PHYSICAN/AP  - Monitor and intervene to maintain adequate nutrition, hydration, elimination, sleep and activity  - Decrease environmental stimuli, including noise as appropriate  - Provide frequent contacts to provide refocusing, direction and reassurance as needed  Approach patient calmly with eye contact and at their level    - [place] high risk fall precautions, aspiration precautions and other safety measures, as indicated  - If delirium suspected, notify physician/AP of change in condition and request immediate in-person evaluation  - Pursue consults as appropriate including Geriatric (campus dependent), OT for cognitive evaluation/activity planning, psychiatric, pastoral care, etc   Outcome: Progressing

## 2022-07-25 NOTE — PROGRESS NOTES
[facility]  Progress Note - Marvin Monroy 1935, 82 y o  male MRN: [id number]  Unit/Bed#: [place] [address] -01 Encounter: [id number]  Primary Care Provider: No primary care provider on file  Date and time admitted to hospital: 7/9/2022 11:02 AM    * Cellulitis  Assessment & Plan  · Presented with left leg cellulitis, surrounding wounds, and growth on leg  · Maggots were found in the wound upon presentation to the ED  · Ct lower extremity: "Extensive subcutaneous edema likely representing cellulitis  Although evaluation for abscess is limited in the absence images contrast no obvious discrete collection is identified  Dermal soft tissue lesion seen in association with the posterior medial calf "  · Completed 7 days of IV antibiotics  · Blood cultures negative thus far  · Remains stable-without any feve,r leukocytosis or leg redness    AKUA (acute kidney injury) ([facility] [address] )  Assessment & Plan  Results from last 7 days   Lab Units 07/23/22  0526   CREATININE mg/dL 0 84   · 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  · Baseline creatinine around 0 9  · Renal ultrasound with normal appearing kidneys and bladder  · Presenting creatinine 1 56, improved with IV fluids him a back to baseline  · Monitor renal function    Leg mass  Assessment & Plan  · Seen and evaluated by General surgery  · Appears mass has been there for quite some time  · Status post bedside incisional biopsy 07/13/2022  · Pathology pending    Definitive treatment pending biopsy results  · Pain control    Type 2 diabetes mellitus with hyperglycemia, without long-term current use of insulin ([facility])  Assessment & Plan  · New onset diabetes mellitus-unclear with the this was undetected and detected as inpt here or this was precipitated by COVID infection  · Hemoglobin A1c checked and is 8 8  · Placed on insulin sliding scale here-t  · Goal for better control of sugars in the setting of acute infection  · Monitor sugars and consider  insulin before discharge-if blood sugars still elevated    Hypertension  Assessment & Plan  · Blood pressure is stable  ·   Continue amlodipine    COVID-19  Assessment & Plan  Detected to be COVID-19 positive on 07/20/2022  Asymptomatic  Will remain in the hospital in quarantine until July 31st    Encounter for competency evaluation  Assessment & Plan  · Neuro psychiatry consultation appreciated, at this point patient does not have capacity to make informed medical decisions    Lab holiday for tomorrow    Subjective:  I feel well  Physical Exam:   Vitals: Blood pressure 141/74, pulse 81, temperature 98 3 °F (36 8 °C), resp  rate 18, height 5' 9" (1 753 m), weight 91 5 kg (201 lb 11 5 oz), SpO2 93 %  ,Body mass index is 29 79 kg/m²  Gen:  Pleasant, non-tachypnic, non-dyspnic  Conversant  Heart: regular rate and rhythm, S1S2 present, no murmur, rub or gallop  Lungs: clear to ausculatation bilaterally  No wheezing, crackless, or rhonchi  No accessory muscle use or respiratory distress  Abd: soft, non-tender, non-distended  NABS, no guarding, rebound or peritoneal signs  Extremities: no clubbing, cyanosis or edema  2+pedal pulses bilaterally  Left leg wrapped  Neuro: awake, alert and oriented  Cranial nerves 2-12 intact  Strength and sensation grossly intact  Skin: warm and dry: no petechiae, purpura and rash      LABS:   Results from last 7 days   Lab Units 07/23/22  0526   WBC Thousand/uL 7 01   HEMOGLOBIN g/dL 15 6   HEMATOCRIT % 47 6   PLATELETS Thousands/uL 212     Results from last 7 days   Lab Units 07/23/22  0526   POTASSIUM mmol/L 3 4*   CHLORIDE mmol/L 101   CO2 mmol/L 26   BUN mg/dL 12   CREATININE mg/dL 0 84   CALCIUM mg/dL 8 3       Intake/Output Summary (Last 24 hours) at 7/25/2022 1017  Last data filed at 7/25/2022 0600  Gross per 24 hour   Intake --   Output 250 ml   Net -250 ml         Current Facility-Administered Medications   Medication Dose Route Frequency Provider Last Rate    acetaminophen  650 mg Oral Q6H PRN Gretchen Bile, DO      amLODIPine  10 mg Oral Daily Major Loyd MD      aspirin  81 mg Oral Daily Major Loyd MD      heparin (porcine)  5,000 Units Subcutaneous [facility] Vanessa Kim, DO      oxyCODONE  2 5 mg Oral Q8H PRN Gretchen Bile, DO      potassium chloride  20 mEq Oral Daily Lisa Akers MD

## 2022-07-25 NOTE — ASSESSMENT & PLAN NOTE
· New onset diabetes mellitus-unclear with the this was undetected and detected as inpt here or this was precipitated by COVID infection  · Hemoglobin A1c checked and is 8 8  · Placed on insulin sliding scale here-t  · Goal for better control of sugars in the setting of acute infection  · Monitor sugars and consider  insulin before discharge-if blood sugars still elevated

## 2022-07-25 NOTE — PHYSICAL THERAPY NOTE
07/25/22 0939   PT Last Visit   PT Visit Date 07/25/22   Note Type   Note Type Cancelled Session   Cancel Reasons Other  (pt refused, reports he doesn't need PT )     Physical Therapy Cancellation Note    Chart review performed  At this time, PT treatment session cancelled  Patient refused PT treatment, reports he doesn't need Physical Therapy, educated on the importance of mobility but continues to refuse  PT will follow and provide PT interventions as appropriate      Valentina Nix, GIOVANNI

## 2022-07-25 NOTE — ASSESSMENT & PLAN NOTE
Results from last 7 days   Lab Units 07/23/22  0526   CREATININE mg/dL 0 84   · 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  · Baseline creatinine around 0 9  · Renal ultrasound with normal appearing kidneys and bladder  · Presenting creatinine 1 56, improved with IV fluids him a back to baseline  · Monitor renal function

## 2022-07-25 NOTE — PROGRESS NOTES
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

## 2022-07-25 NOTE — ASSESSMENT & PLAN NOTE
Detected to be COVID-19 positive on 07/20/2022  Asymptomatic    Will remain in the hospital in quarantine until July 31st

## 2022-07-25 NOTE — ASSESSMENT & PLAN NOTE
· Presented with left leg cellulitis, surrounding wounds, and growth on leg  · Maggots were found in the wound upon presentation to the ED  · Ct lower extremity: "Extensive subcutaneous edema likely representing cellulitis  Although evaluation for abscess is limited in the absence images contrast no obvious discrete collection is identified    Dermal soft tissue lesion seen in association with the posterior medial calf "  · Completed 7 days of IV antibiotics  · Blood cultures negative thus far  · Remains stable-without any feve,r leukocytosis or leg redness

## 2022-07-25 NOTE — ASSESSMENT & PLAN NOTE
· Seen and evaluated by General surgery  · Appears mass has been there for quite some time  · Status post bedside incisional biopsy 07/13/2022  · Pathology pending    Definitive treatment pending biopsy results  · Pain control

## 2022-07-26 ENCOUNTER — APPOINTMENT (INPATIENT)
Dept: CT IMAGING | Facility: HOSPITAL | Age: 87
DRG: 602 | End: 2022-07-26
Payer: MEDICARE

## 2022-07-26 ENCOUNTER — TELEPHONE (OUTPATIENT)
Dept: HEMATOLOGY ONCOLOGY | Facility: CLINIC | Age: 87
End: 2022-07-26

## 2022-07-26 LAB
GLUCOSE SERPL-MCNC: 105 MG/DL (ref 65–140)
GLUCOSE SERPL-MCNC: 129 MG/DL (ref 65–140)

## 2022-07-26 PROCEDURE — 97535 SELF CARE MNGMENT TRAINING: CPT

## 2022-07-26 PROCEDURE — G1004 CDSM NDSC: HCPCS

## 2022-07-26 PROCEDURE — 99232 SBSQ HOSP IP/OBS MODERATE 35: CPT | Performed by: STUDENT IN AN ORGANIZED HEALTH CARE EDUCATION/TRAINING PROGRAM

## 2022-07-26 PROCEDURE — 74177 CT ABD & PELVIS W/CONTRAST: CPT

## 2022-07-26 PROCEDURE — 97530 THERAPEUTIC ACTIVITIES: CPT

## 2022-07-26 PROCEDURE — 82948 REAGENT STRIP/BLOOD GLUCOSE: CPT

## 2022-07-26 PROCEDURE — 71260 CT THORAX DX C+: CPT

## 2022-07-26 PROCEDURE — 97116 GAIT TRAINING THERAPY: CPT

## 2022-07-26 RX ADMIN — BUTALBITAL, ACETAMINOPHEN AND CAFFEINE 1 TABLET: 50; 325; 40 TABLET ORAL at 12:52

## 2022-07-26 RX ADMIN — HEPARIN SODIUM 5000 UNITS: 5000 INJECTION INTRAVENOUS; SUBCUTANEOUS at 13:58

## 2022-07-26 RX ADMIN — HEPARIN SODIUM 5000 UNITS: 5000 INJECTION INTRAVENOUS; SUBCUTANEOUS at 05:10

## 2022-07-26 RX ADMIN — ASPIRIN 81 MG: 81 TABLET, COATED ORAL at 08:11

## 2022-07-26 RX ADMIN — HEPARIN SODIUM 5000 UNITS: 5000 INJECTION INTRAVENOUS; SUBCUTANEOUS at 21:50

## 2022-07-26 RX ADMIN — POTASSIUM CHLORIDE 20 MEQ: 1500 TABLET, EXTENDED RELEASE ORAL at 08:11

## 2022-07-26 RX ADMIN — IOHEXOL 100 ML: 350 INJECTION, SOLUTION INTRAVENOUS at 20:45

## 2022-07-26 RX ADMIN — BUTALBITAL, ACETAMINOPHEN AND CAFFEINE 1 TABLET: 50; 325; 40 TABLET ORAL at 20:14

## 2022-07-26 RX ADMIN — AMLODIPINE BESYLATE 10 MG: 10 TABLET ORAL at 08:11

## 2022-07-26 NOTE — ASSESSMENT & PLAN NOTE
Pathology showing Melanoma  Discussed case with Oncology Herrera Bryan MD   · Recommendations were: Pathology noted  CT CAP for staging  F/u to be placed with Dr Erin Zheng for melanoma medical oncology care    · Plastics consulted per surgery recommendations for complete excision

## 2022-07-26 NOTE — TREATMENT PLAN
Pathology noted  Will consult surgical oncology and medical oncology for management options  Patient will likely need Plastic surgery as well for complete excision

## 2022-07-26 NOTE — TELEPHONE ENCOUNTER
Intake received  pt has active medicare A & B  Pt also has an active supplemental plan E thru [employer]  That plan will pay the 20% that medicare doesn't pay  Pt outreach not needed at this time

## 2022-07-26 NOTE — PLAN OF CARE
Problem: Potential for Falls  Goal: Patient will remain free of falls  Description: INTERVENTIONS:  - Educate patient/family on patient safety including physical limitations  - Instruct patient to call for assistance with activity   - Consult OT/PT to assist with strengthening/mobility   - Keep Call bell within reach  - Keep bed low and locked with side rails adjusted as appropriate  - Keep care items and personal belongings within reach  - Initiate and maintain comfort rounds  - Make Fall Risk Sign visible to staff  - Offer Toileting every 2 Hours, in advance of need  - Initiate/Maintain bed/chair alarm  - Obtain necessary fall risk management equipment:bed/chair alarm, call bell, walker, gripper socks, rounds  - Apply yellow socks and bracelet for high fall risk patients  - Consider moving patient to room near nurses station  Outcome: Progressing     Problem: Prexisting or High Potential for Compromised Skin Integrity  Goal: Skin integrity is maintained or improved  Description: INTERVENTIONS:  - Identify patients at risk for skin breakdown  - Assess and monitor skin integrity  - Assess and monitor nutrition and hydration status  - Monitor labs   - Assess for incontinence   - Instruct patient to turn and repostion every 2 hours while in bed and weight shift every 15 minutes while in chair  - Assist with mobility/ambulation  - Relieve pressure over bony prominences  - Avoid friction and shearing  - Provide appropriate hygiene as needed including keeping skin clean and dry  - Evaluate need for skin moisturizer/barrier cream  - Collaborate with interdisciplinary team   - Patient/family teaching  - Consider wound care consult   Outcome: Progressing     Problem: MOBILITY - ADULT  Goal: Maintain or return to baseline ADL function  Description: INTERVENTIONS:  -  Assess patient's ability to carry out ADLs; assess patient's baseline for ADL function and identify physical deficits which impact ability to perform ADLs (bathing, care of mouth/teeth, toileting, grooming, dressing, etc )  - Assess/evaluate cause of self-care deficits   - Assess range of motion  - Assess patient's mobility; develop plan if impaired  - Assess patient's need for assistive devices and provide as appropriate  - Encourage maximum independence but intervene and supervise when necessary  - Involve family in performance of ADLs  - Assess for home care needs following discharge   - Consider OT consult to assist with ADL evaluation and planning for discharge  - Provide patient education as appropriate  Outcome: Progressing  Goal: Maintains/Returns to pre admission functional level  Description: INTERVENTIONS:  - Perform BMAT or MOVE assessment daily    - Set and communicate daily mobility goal to care team and patient/family/caregiver  - Collaborate with rehabilitation services on mobility goals if consulted  - Perform Range of Motion  times a day  - Reposition patient every  hours    - Dangle patient  times a day  - Stand patient  times a day  - Ambulate patient  times a day  - Out of bed to chair  times a day   - Out of bed for meals  times a day  - Out of bed for toileting  - Record patient progress and toleration of activity level   Outcome: Progressing     Problem: INFECTION - ADULT  Goal: Absence or prevention of progression during hospitalization  Description: INTERVENTIONS:  - Assess and monitor for signs and symptoms of infection  - Monitor lab/diagnostic results  - Monitor all insertion sites, i e  indwelling lines, tubes, and drains  - Monitor endotracheal if appropriate and nasal secretions for changes in amount and color  - [place] appropriate cooling/warming therapies per order  - Administer medications as ordered  - Instruct and encourage patient and family to use good hand hygiene technique  - Identify and instruct in appropriate isolation precautions for identified infection/condition  Outcome: Progressing     Problem: DISCHARGE PLANNING  Goal: Discharge to home or other facility with appropriate resources  Description: INTERVENTIONS:  - Identify barriers to discharge w/patient and caregiver  - Arrange for needed discharge resources and transportation as appropriate  - Identify discharge learning needs (meds, wound care, etc )  - Arrange for interpretive services to assist at discharge as needed  - Refer to Case Management Department for coordinating discharge planning if the patient needs post-hospital services based on physician/advanced practitioner order or complex needs related to functional status, cognitive ability, or social support system  Outcome: Progressing     Problem: SKIN/TISSUE INTEGRITY - ADULT  Goal: Incision(s), wounds(s) or drain site(s) healing without S/S of infection  Description: INTERVENTIONS  - Assess and document dressing, incision, wound bed, drain sites and surrounding tissue  - Provide patient and family education  - Perform skin care/dressing changes ev  Outcome: Progressing     Problem: PAIN - ADULT  Goal: Verbalizes/displays adequate comfort level or baseline comfort level  Description: Interventions:  - Encourage patient to monitor pain and request assistance  - Assess pain using appropriate pain scale  - Administer analgesics based on type and severity of pain and evaluate response  - Implement non-pharmacological measures as appropriate and evaluate response  - Consider cultural and social influences on pain and pain management  - Notify physician/advanced practitioner if interventions unsuccessful or patient reports new pain  Outcome: Progressing     Problem: CONFUSION/THOUGHT DISTURBANCE  Goal: Thought disturbances (confusion, delirium, depression, dementia or psychosis) are managed to maintain or return to baseline mental status and functional level  Description: INTERVENTIONS:  - Assess for possible contributors to  thought disturbance, including but not limited to medications, infection, impaired vision or hearing, underlying metabolic abnormalities, dehydration, respiratory compromise,  psychiatric diagnoses and notify attending PHYSICAN/AP  - Monitor and intervene to maintain adequate nutrition, hydration, elimination, sleep and activity  - Decrease environmental stimuli, including noise as appropriate  - Provide frequent contacts to provide refocusing, direction and reassurance as needed  Approach patient calmly with eye contact and at their level    - Falls high risk fall precautions, aspiration precautions and other safety measures, as indicated  - If delirium suspected, notify physician/AP of change in condition and request immediate in-person evaluation  - Pursue consults as appropriate including Geriatric (campus dependent), OT for cognitive evaluation/activity planning, psychiatric, pastoral care, etc   Outcome: Progressing

## 2022-07-26 NOTE — PLAN OF CARE
Problem: Potential for Falls  Goal: Patient will remain free of falls  Description: INTERVENTIONS:  - Educate patient/family on patient safety including physical limitations  - Instruct patient to call for assistance with activity   - Consult OT/PT to assist with strengthening/mobility   - Keep Call bell within reach  - Keep bed low and locked with side rails adjusted as appropriate  - Keep care items and personal belongings within reach  - Initiate and maintain comfort rounds  - Make Fall Risk Sign visible to staff  - Offer Toileting every 2 Hours, in advance of need  - Initiate/Maintain bed/chair alarm  - Obtain necessary fall risk management equipment:bed/chair alarm, call bell, walker, gripper socks, rounds  - Apply yellow socks and bracelet for high fall risk patients  - Consider moving patient to room near nurses station  Outcome: Progressing     Problem: Prexisting or High Potential for Compromised Skin Integrity  Goal: Skin integrity is maintained or improved  Description: INTERVENTIONS:  - Identify patients at risk for skin breakdown  - Assess and monitor skin integrity  - Assess and monitor nutrition and hydration status  - Monitor labs   - Assess for incontinence   - Instruct patient to turn and repostion every 2 hours while in bed and weight shift every 15 minutes while in chair  - Assist with mobility/ambulation  - Relieve pressure over bony prominences  - Avoid friction and shearing  - Provide appropriate hygiene as needed including keeping skin clean and dry  - Evaluate need for skin moisturizer/barrier cream  - Collaborate with interdisciplinary team   - Patient/family teaching  - Consider wound care consult   Outcome: Progressing     Problem: MOBILITY - ADULT  Goal: Maintain or return to baseline ADL function  Description: INTERVENTIONS:  -  Assess patient's ability to carry out ADLs; assess patient's baseline for ADL function and identify physical deficits which impact ability to perform ADLs (bathing, care of mouth/teeth, toileting, grooming, dressing, etc )  - Assess/evaluate cause of self-care deficits   - Assess range of motion  - Assess patient's mobility; develop plan if impaired  - Assess patient's need for assistive devices and provide as appropriate  - Encourage maximum independence but intervene and supervise when necessary  - Involve family in performance of ADLs  - Assess for home care needs following discharge   - Consider OT consult to assist with ADL evaluation and planning for discharge  - Provide patient education as appropriate  Outcome: Progressing  Goal: Maintains/Returns to pre admission functional level  Description: INTERVENTIONS:  - Perform BMAT or MOVE assessment daily    - Set and communicate daily mobility goal to care team and patient/family/caregiver  - Collaborate with rehabilitation services on mobility goals if consulted  - Perform Range of Motion  times a day  - Reposition patient every  hours    - Dangle patient times a day  - Stand patient  times a day  - Ambulate patient  times a day  - Out of bed to chair  times a day   - Out of bed for meals  times a day  - Out of bed for toileting  - Record patient progress and toleration of activity level   Outcome: Progressing     Problem: INFECTION - ADULT  Goal: Absence or prevention of progression during hospitalization  Description: INTERVENTIONS:  - Assess and monitor for signs and symptoms of infection  - Monitor lab/diagnostic results  - Monitor all insertion sites, i e  indwelling lines, tubes, and drains  - Monitor endotracheal if appropriate and nasal secretions for changes in amount and color  - [place] appropriate cooling/warming therapies per order  - Administer medications as ordered  - Instruct and encourage patient and family to use good hand hygiene technique  - Identify and instruct in appropriate isolation precautions for identified infection/condition  Outcome: Progressing     Problem: DISCHARGE PLANNING  Goal: Discharge to home or other facility with appropriate resources  Description: INTERVENTIONS:  - Identify barriers to discharge w/patient and caregiver  - Arrange for needed discharge resources and transportation as appropriate  - Identify discharge learning needs (meds, wound care, etc )  - Arrange for interpretive services to assist at discharge as needed  - Refer to Case Management Department for coordinating discharge planning if the patient needs post-hospital services based on physician/advanced practitioner order or complex needs related to functional status, cognitive ability, or social support system  Outcome: Progressing     Problem: PAIN - ADULT  Goal: Verbalizes/displays adequate comfort level or baseline comfort level  Description: Interventions:  - Encourage patient to monitor pain and request assistance  - Assess pain using appropriate pain scale  - Administer analgesics based on type and severity of pain and evaluate response  - Implement non-pharmacological measures as appropriate and evaluate response  - Consider cultural and social influences on pain and pain management  - Notify physician/advanced practitioner if interventions unsuccessful or patient reports new pain  Outcome: Progressing     Problem: SKIN/TISSUE INTEGRITY - ADULT  Goal: Incision(s), wounds(s) or drain site(s) healing without S/S of infection  Description: INTERVENTIONS  - Assess and document dressing, incision, wound bed, drain sites and surrounding tissue  - Provide patient and family education  - Perform skin care/dressing changes every  Outcome: Progressing     Problem: CONFUSION/THOUGHT DISTURBANCE  Goal: Thought disturbances (confusion, delirium, depression, dementia or psychosis) are managed to maintain or return to baseline mental status and functional level  Description: INTERVENTIONS:  - Assess for possible contributors to  thought disturbance, including but not limited to medications, infection, impaired vision or hearing, underlying metabolic abnormalities, dehydration, respiratory compromise,  psychiatric diagnoses and notify attending PHYSICAN/AP  - Monitor and intervene to maintain adequate nutrition, hydration, elimination, sleep and activity  - Decrease environmental stimuli, including noise as appropriate  - Provide frequent contacts to provide refocusing, direction and reassurance as needed  Approach patient calmly with eye contact and at their level    - [place] high risk fall precautions, aspiration precautions and other safety measures, as indicated  - If delirium suspected, notify physician/AP of change in condition and request immediate in-person evaluation  - Pursue consults as appropriate including Geriatric (campus dependent), OT for cognitive evaluation/activity planning, psychiatric, pastoral care, etc   Outcome: Progressing

## 2022-07-26 NOTE — OCCUPATIONAL THERAPY NOTE
Occupational Therapy Progress Note     Patient Name: Sriram Moore  QSQFU'V Date: 7/26/2022    OT Treatment Time:  6060-7647    Problem List  Principal Problem:    Cellulitis  Active Problems:    AKUA (acute kidney injury) ([facility] )    Leg mass    Type 2 diabetes mellitus with hyperglycemia, without long-term current use of insulin ([facility] )    Hypertension    Encounter for competency evaluation    COVID-19    Malignant melanoma of leg, left ([facility] )        07/26/22 1017   OT Last Visit   OT Visit Date 07/26/22   Note Type   Note Type Treatment   Restrictions/Precautions   Weight Bearing Precautions Per Order No   Other Precautions Contact/isolation; Airborne/isolation;Cognitive; Chair Alarm; Bed Alarm;Telemetry; Fall Risk;Hard of hearing   Pain Assessment   Pain Assessment Tool 0-10   Pain Score No Pain   ADL   Eating Assistance 7  Independent   Eating Deficit Setup   Grooming Assistance 4  Minimal Assistance   Grooming Deficit Setup;Verbal cueing;Supervision/safety; Increased time to complete;Standing with assistive device  (at sink level, verbal cues for problem solving and thoroughness (use soap to clean visibly dirty hands))   LB Dressing Assistance 2  Maximal Assistance   LB Dressing Deficit Don/doff R sock; Don/doff L sock   Toileting Assistance  3  Moderate Assistance   Toileting Deficit Perineal hygiene  (cues and assist for thoroughness of hygiene)   Functional Standing Tolerance   Time ~5 minutes   Activity functional ambulation in room, static standing   Comments with RW   Bed Mobility   Rolling R 5  Supervision   Additional items HOB elevated; Bedrails; Increased time required   Supine to Sit 5  Supervision   Additional items HOB elevated; Bedrails; Increased time required   Transfers   Sit to Stand 5  Supervision   Additional items Bedrails; Increased time required  (with Rw)   Stand to Sit 4  Minimal assistance   Additional items Assist x 1; Armrests; Increased time required   Stand pivot 5  Supervision   Additional items Increased time required;Verbal cues  (with RW)   Toilet transfer 4  Minimal assistance   Additional items Assist x 1;Standard toilet  (grab bar, RW)   Functional Mobility   Functional Mobility 5  Supervision   Additional Comments with RW, slowed pace, mildly unsteady but no overt LOB   Cognition   Overall Cognitive Status Impaired   Arousal/Participation Alert; Cooperative   Attention Within functional limits   Orientation Level Oriented to person;Oriented to place  (to month and year, not date)   Memory Decreased recall of recent events;Decreased recall of precautions;Decreased short term memory   Following Commands Follows one step commands without difficulty   Activity Tolerance   Activity Tolerance Patient tolerated treatment well   Medical Staff Made Aware Inocencio Allen PTA, RN   Assessment   Assessment Pt seen this date for skilled OT session focused on ADLs, functional transfers and mobility, safety education  The patient was received supine in bed, NAD, PIV access, on RA  He participated in functional ambulation in room, toileting, light grooming at sink and while seated  Pt was supervision to Minimal Assistance with RW for most mobility, and Minimal Assistance to Moderate Assistance for light ADLs for thoroughness and attention to task  At end of session the patient was located seated in bed side chair with call bell in reach and all needs met  Chair alarm activated Overall the patient remains below his functional baseline, and is primarily limited at this time due to impaired cognition, impaired balance, decreased activity tolerance, and generalized deconditioning  OT will continue to follow while acute to address POC  At this time, recommend inpatient rehab upon d/c    Plan   Treatment Interventions ADL retraining;Functional transfer training; Endurance training;Cognitive reorientation;Patient/family training   Goal Expiration Date 08/09/22   OT Treatment Day 2   OT Frequency 2-3x/wk   Recommendation   OT Discharge Recommendation Post acute rehabilitation services   Additional Comments  The patient's raw score on the AM-PAC Daily Activity inpatient short form is 18, standardized score is 38 66, less than 39 4  Patients at this level are likely to benefit from discharge to post-acute rehabilitation services  Please refer to the recommendation of the Occupational Therapist for safe discharge planning     AM-PAC Daily Activity Inpatient   Lower Body Dressing 2   Bathing 3   Toileting 3   Upper Body Dressing 3   Grooming 3   Eating 4   Daily Activity Raw Score 18   Daily Activity Standardized Score (Calc for Raw Score >=11) 38 66   [facility] Applied Cognition Inpatient   Following a Speech/Presentation 3   Understanding Ordinary Conversation 3   Taking Medications 2   Remembering Where Things Are Placed or Put Away 3   Remembering List of 4-5 Errands 2   Taking Care of Complicated Tasks 2   Applied Cognition Raw Score 15   Applied Cognition Standardized Score 33 54       Updated Goals: to be met by 8/9/22    Patient will perform functional bed mobility with independence, with HOB flat, no rails  Patient will perform functional transfers with Standby Assistance in preparation for ADL tasks, with good safety awareness  Patient will perform UB dressing task with [address] while seated  Patient will perform LB dressing task with [address]   Patient will perform toilet transfer with [address]  Patient will perform toileting with Standby Assistance, including hygiene and clothing management   Patient will improve activity tolerance by participating in 30 minutes of session at a time in preparation for participation in ADL tasks  Patient will identify 3 potential fall hazards and identify compensatory techniques to decrease fall risk in the home environment  Patient will attend to 100% of cognitive task during session       OLI Liriano/KRISSY

## 2022-07-26 NOTE — PROGRESS NOTES
[facility]  Progress Note - Donald Munguia 1935, 82 y o  male MRN: [id number]  Unit/Bed#: [place] [address] -01 Encounter: [id number]  Primary Care Provider: No primary care provider on file  Date and time admitted to hospital: 7/9/2022 11:02 AM    * Cellulitis  Assessment & Plan  Presented with left leg cellulitis, surrounding wounds, and growth on leg  · Maggots were found in the wound upon presentation to the ED  · Ct lower extremity: "Extensive subcutaneous edema likely representing cellulitis  Although evaluation for abscess is limited in the absence images contrast no obvious discrete collection is identified  Dermal soft tissue lesion seen in association with the posterior medial calf "  · Completed 7 days of IV antibiotics  · Blood cultures negative thus far  · Remains stable-without any fever, leukocytosis or leg redness    Malignant melanoma of leg, left [facility])  Assessment & Plan  Pathology showing Melanoma  Discussed case with Oncology Fay Hernandez MD   · Recommendations were: Pathology noted  CT CAP for staging  F/u to be placed with Dr Ashley Monsalve for melanoma medical oncology care  · Plastics consulted per surgery recommendations for complete excision      COVID-19  Assessment & Plan  Detected to be COVID-19 positive on 07/20/2022  Asymptomatic    Will remain in the hospital in quarantine until July 31st    Encounter for competency evaluation  Assessment & Plan  · Neuropsychiatry consultation appreciated, at this point patient does not have capacity to make informed medical decisions    Hypertension  Assessment & Plan  Controlled  - Continue Amlodipine    Type 2 diabetes mellitus with hyperglycemia, without long-term current use of insulin ([facility] )  Assessment & Plan  New onset diabetes mellitus-unclear with the this was undetected and detected as inpt here or this was precipitated by COVID infection  · Hemoglobin A1c checked and is 8 8  · Inpatient regimen: Sliding scale    Recent Labs     22  1121 22  1515 22  2101 22  0734 22  1112   POCGLU 124 143* 104 105 129         AKUA (acute kidney injury) ([facility] )  Assessment & Plan  Resolved  VTE Pharmacologic Prophylaxis:   Pharmacologic: Heparin  Mechanical VTE Prophylaxis in Place: Yes    Discussions with Specialists or Other Care Team Provider: nursing    Education and Discussions with Family / Patient: patient, called Surinder franz VM    Time Spent for Care: 30 minutes  More than 50% of total time spent on counseling and coordination of care as described above  Current Length of Stay: 17 day(s)    Current Patient Status: Inpatient   Certification Statement: The patient will continue to require additional inpatient hospital stay due to ct cap then placement    Discharge Plan:     Code Status: Level 1 - Full Code      Subjective:   Patient seen and examined at bedside  I informed him of the pathology report which stated that he had melanoma  I attempted to call HCP Surinder Franz VM since she did not answer  Objective:     Vitals:   Temp (24hrs), Av °F (36 7 °C), Min:97 4 °F (36 3 °C), Max:98 6 °F (37 °C)    Temp:  [97 4 °F (36 3 °C)-98 6 °F (37 °C)] 97 4 °F (36 3 °C)  HR:  [84-89] 84  Resp:  [17-20] 20  BP: (142-154)/(70-71) 154/71  SpO2:  [92 %-95 %] 95 %  Body mass index is 29 79 kg/m²  Input and Output Summary (last 24 hours): Intake/Output Summary (Last 24 hours) at 2022 1752  Last data filed at 2022 6731  Gross per 24 hour   Intake --   Output 100 ml   Net -100 ml       Physical Exam:     Physical Exam  Vitals reviewed  HENT:      Head: Normocephalic  Nose: Nose normal       Mouth/Throat:      Mouth: Mucous membranes are moist    Eyes:      General: No scleral icterus  Cardiovascular:      Rate and Rhythm: Normal rate  Pulmonary:      Effort: Pulmonary effort is normal  No respiratory distress  Abdominal:      Palpations: Abdomen is soft  Tenderness: There is no abdominal tenderness  Skin:     General: Skin is warm  Neurological:      Mental Status: He is alert and oriented to person, place, and time  Mental status is at baseline  Psychiatric:         Mood and Affect: Mood normal          Behavior: Behavior normal        Additional Data:     Labs:    Results from last 7 days   Lab Units 07/23/22  0526   WBC Thousand/uL 7 01   HEMOGLOBIN g/dL 15 6   HEMATOCRIT % 47 6   PLATELETS Thousands/uL 212   NEUTROS PCT % 57   LYMPHS PCT % 31   MONOS PCT % 10   EOS PCT % 2     Results from last 7 days   Lab Units 07/23/22  0526   SODIUM mmol/L 138   POTASSIUM mmol/L 3 4*   CHLORIDE mmol/L 101   CO2 mmol/L 26   BUN mg/dL 12   CREATININE mg/dL 0 84   ANION GAP mmol/L 11   CALCIUM mg/dL 8 3   GLUCOSE RANDOM mg/dL 103         Results from last 7 days   Lab Units 07/26/22  1112 07/26/22  0734 07/25/22  2101 07/25/22  1515 07/25/22  1121 07/21/22  2138 07/21/22  1453 07/21/22  1052 07/21/22  0717 07/20/22  2103 07/20/22  1608 07/20/22  1111   POC GLUCOSE mg/dl 129 105 104 143* 124 115 153* 165* 133 144* 135 125                   * I Have Reviewed All Lab Data Listed Above  * Additional Pertinent Lab Tests Reviewed:  David [facility] Admission Reviewed    Imaging:    Imaging Reports Reviewed Today Include: imaging since admission    Recent Cultures (last 7 days):           Last 24 Hours Medication List:   Current Facility-Administered Medications   Medication Dose Route Frequency Provider Last Rate    acetaminophen  650 mg Oral Q6H PRN Vanessa Kim DO      al mag oxide-diphenhydramine-lidocaine viscous  10 mL Swish & Swallow Q6H PRN Gardenia Ervin MD      amLODIPine  10 mg Oral Daily Zahra Alva MD      aspirin  81 mg Oral Daily Zahra Alva MD      butalbital-acetaminophen-caffeine  1 tablet Oral Q4H PRN Gardenia Ervin MD      heparin (porcine)  5,000 Units Subcutaneous Q8H [address] Vanessa Kim DO      oxyCODONE  2 5 mg Oral Q8H PRN Baylee Simpler, DO      potassium chloride  20 mEq Oral Daily Andria Finch MD          Today, Patient Was Seen By: Jenniffer Crespo MD    ** Please Note: Dictation voice to text software may have been used in the creation of this document   **

## 2022-07-26 NOTE — ASSESSMENT & PLAN NOTE
New onset diabetes mellitus-unclear with the this was undetected and detected as inpt here or this was precipitated by COVID infection  · Hemoglobin A1c checked and is 8 8  · Inpatient regimen: Sliding scale    Recent Labs     07/25/22  1121 07/25/22  1515 07/25/22  2101 07/26/22  0734 07/26/22  1112   POCGLU 124 143* 104 105 129

## 2022-07-26 NOTE — ASSESSMENT & PLAN NOTE
Presented with left leg cellulitis, surrounding wounds, and growth on leg  · Maggots were found in the wound upon presentation to the ED  · Ct lower extremity: "Extensive subcutaneous edema likely representing cellulitis  Although evaluation for abscess is limited in the absence images contrast no obvious discrete collection is identified    Dermal soft tissue lesion seen in association with the posterior medial calf "  · Completed 7 days of IV antibiotics  · Blood cultures negative thus far  · Remains stable-without any fever, leukocytosis or leg redness

## 2022-07-26 NOTE — TELEPHONE ENCOUNTER
Soft Intake Form   Patient Details   Osiris Jerome     1935     Reason For Appointment   Who is Calling? Charanjit Darnell   If not patient, Name? NA    DID YOU CONFIRM INSURANCE WITH PATIENT? Routed to Finance   Who is the Referring Doctor? Dr Jonna Dickerson   What is the diagnosis? L LE Mass-Melanoma   Has this diagnosis been confirmed by a biopsy/surgery? If yes, what is the date it was done? 7/13 leg mass biopsy taken   Biopsy done at [place] [address]? If not, where was it done? Yes-[facility]   Was imaging done, and was it done at [facility]? If not, where was it done? Yes   Have you been seen by another Oncologist?  If so, who and where (name of facility, city and state) No   For 2nd Opinions Only: Are you currently undergoing treatment, or are you scheduled to start treatment? If yes, name of facility, city and state NA    For "History Of" only: Have you completed treatment? NA    Have you had Genetic Testing done in the past?  If so, advise to bring test results to their visit Unknown   Record Gathering Information   Did you advise to have records faxed to [phone]? NA   Did you advise to have disks sent to the proper address with imaging? ("History of" Patients)  5 years of imaging for breast patients-Mammos, US etc NA   Scheduling Information   Did you send new patient paperwork? Email or mail? NA   What is the best call back number? (If the [facility] is calling, please use their number) NA   Miscellaneous Information      The patient has been scheduled for a HFU appointment on 8/3 with Dr Joseph Butterfield in the [facility] office at [phone]

## 2022-07-26 NOTE — PLAN OF CARE
Problem: PHYSICAL THERAPY ADULT  Goal: Performs mobility at highest level of function for planned discharge setting  See evaluation for individualized goals  Outcome: Progressing  Note: Prognosis: Good  Problem List: Decreased strength, Impaired balance, Decreased endurance, Decreased mobility, Impaired judgement, Decreased safety awareness  Assessment: Pt seen for PT treatment session this date with interventions consisting of gait training w/ emphasis on improving pt's ability to ambulate level surfaces x 15 ftx1, 30 ftx1 with close S provided by therapist with RW and therapeutic activity consisting of training: bed mobility, supine<>sit transfers, sit<>stand transfers and toilet transfer  Pt agreeable to PT treatment session upon arrival, pt found supine in bed w/ HOB elevated, in no apparent distress and responsive  In comparison to previous session, pt with improvements in functional activity tolerance  Post session: pt returned back to recliner, chair alarm engaged, all needs in reach and RN notified of session findings/recommendations  Continue to recommend home with home health rehabilitation in a supervised environment at time of d/c in order to maximize pt's functional independence and safety w/ mobility  Pt continues to be functioning below baseline level  PT will continue to see pt during current hospitalization in order to address the deficits listed above and provide interventions consistent w/ POC in effort to achieve STGs  Barriers to Discharge: Decreased caregiver support  Barriers to Discharge Comments: lives alone  PT Discharge Recommendation: Home with home health rehabilitation (w/ inc supervision)    See flowsheet documentation for full assessment

## 2022-07-26 NOTE — QUICK NOTE
Pathology noted  CT CAP for staging  F/u to be placed with Dr Tiffanie Hurt for melanoma medical oncology care  Pt was not seen   This was discussed withDr Esperanza Ortiz MD

## 2022-07-26 NOTE — ASSESSMENT & PLAN NOTE
· Neuropsychiatry consultation appreciated, at this point patient does not have capacity to make informed medical decisions

## 2022-07-26 NOTE — PHYSICAL THERAPY NOTE
PHYSICAL THERAPY TREATMENT NOTE  NAME:  Dick Berkowitz  DATE: 07/26/22    Length Of Stay: 17  Performed at least 2 patient identifiers during session: Name and ID bracelet    TREATMENT:      07/26/22 1040   PT Last Visit   PT Visit Date 07/26/22   Note Type   Note Type Treatment   Pain Assessment   Pain Assessment Tool 0-10   Pain Score No Pain   Restrictions/Precautions   Weight Bearing Precautions Per Order No   Other Precautions Contact/isolation; Airborne/isolation; Chair Alarm; Bed Alarm; Fall Risk   General   Chart Reviewed Yes   Cognition   Overall Cognitive Status Impaired   Arousal/Participation Alert   Attention Within functional limits   Orientation Level Oriented to person;Oriented to place; Disoriented to time;Disoriented to situation   Memory Decreased recall of recent events;Decreased recall of precautions   Following Commands Follows one step commands without difficulty   Comments pt agreeable to limited PT session   Bed Mobility   Supine to Sit 5  Supervision   Additional items Increased time required;Verbal cues   Additional Comments pt OOB in chair to end session   Transfers   Sit to Stand 5  Supervision   Additional items Increased time required;Verbal cues   Stand to Sit 5  Supervision   Additional items Increased time required;Verbal cues   Toilet transfer 4  Minimal assistance   Additional items Increased time required;Standard toilet   Additional Comments required assist transfer from low toilet   Ambulation/Elevation   Gait pattern Decreased foot clearance; Foward flexed; Short stride   Gait Assistance 5  Supervision   Additional items Verbal cues   Assistive Device Rolling walker   Distance 15 ftx1, 30 ftx1   Balance   Static Sitting Good   Dynamic Sitting Fair +   Static Standing Fair   Dynamic Standing Fair -   Ambulatory Fair -   Endurance Deficit   Endurance Deficit Yes   Activity Tolerance   Activity Tolerance Patient limited by fatigue   Nurse Made Aware yes   Assessment Prognosis Good   Problem List Decreased strength; Impaired balance;Decreased endurance;Decreased mobility; Impaired judgement;Decreased safety awareness   Assessment Pt seen for PT treatment session this date with interventions consisting of gait training w/ emphasis on improving pt's ability to ambulate level surfaces x 15 ftx1, 30 ftx1 with close S provided by therapist with RW and therapeutic activity consisting of training: bed mobility, supine<>sit transfers, sit<>stand transfers and toilet transfer  Pt agreeable to PT treatment session upon arrival, pt found supine in bed w/ HOB elevated, in no apparent distress and responsive  In comparison to previous session, pt with improvements in functional activity tolerance  Post session: pt returned back to recliner, chair alarm engaged, all needs in reach and RN notified of session findings/recommendations  Continue to recommend home with home health rehabilitation in a supervised environment at time of d/c in order to maximize pt's functional independence and safety w/ mobility  Pt continues to be functioning below baseline level  PT will continue to see pt during current hospitalization in order to address the deficits listed above and provide interventions consistent w/ POC in effort to achieve STGs  Barriers to Discharge Decreased caregiver support   Plan   Treatment/Interventions Functional transfer training;LE strengthening/ROM; Therapeutic exercise; Endurance training;Patient/family training;Equipment eval/education; Bed mobility;Gait training;Spoke to nursing   Progress Progressing toward goals   PT Frequency 2-3x/wk   Recommendation   PT Discharge Recommendation Home with home health rehabilitation  (w/ inc supervision)   AM-PAC Basic Mobility Inpatient   Turning in Bed Without Bedrails 4   Lying on Back to Sitting on Edge of Flat Bed 4   Moving Bed to Chair 3   Standing Up From Chair 3   Walk in Room 3   Climb 3-5 Stairs 3   Basic Mobility Inpatient Raw Score 20   Basic Mobility Standardized Score 43 99   Highest Level Of Mobility   -HLM Goal 6: Walk 10 steps or more   -HLM Achieved 7: Walk 25 feet or more   Education   Education Provided Mobility training;Assistive device   Patient Demonstrates verbal understanding;Reinforcement needed   End of Consult   Patient Position at End of Consult Bedside chair; All needs within reach;Bed/Chair alarm activated       The patient's AM-PAC Basic Mobility Inpatient Short Form Raw Score is 20  A Raw score of greater than 16 suggests the patient may benefit from discharge to home  Please also refer to the recommendation of the Physical Therapist for safe discharge planning        Eric Read, PTA,PTA

## 2022-07-26 NOTE — PLAN OF CARE
Problem: OCCUPATIONAL THERAPY ADULT  Goal: Performs self-care activities at highest level of function for planned discharge setting  See evaluation for individualized goals  Description: Treatment Interventions: ADL retraining, Functional transfer training, Endurance training, Cognitive reorientation, Patient/family training          See flowsheet documentation for full assessment, interventions and recommendations  Outcome: Progressing  Note: Limitation: Decreased ADL status, Decreased UE strength, Decreased Safe judgement during ADL, Decreased cognition, Decreased endurance, Decreased high-level ADLs  Prognosis: Fair  Assessment: Pt seen this date for skilled OT session focused on ADLs, functional transfers and mobility, safety education  The patient was received supine in bed, NAD, PIV access, on RA  He participated in functional ambulation in room, toileting, light grooming at sink and while seated  Pt was supervision to Minimal Assistance with RW for most mobility, and Minimal Assistance to Moderate Assistance for light ADLs for thoroughness and attention to task  At end of session the patient was located seated in bed side chair with call bell in reach and all needs met  Chair alarm activated Overall the patient remains below his functional baseline, and is primarily limited at this time due to impaired cognition, impaired balance, decreased activity tolerance, and generalized deconditioning  OT will continue to follow while acute to address POC   At this time, recommend inpatient rehab upon d/c      OT Discharge Recommendation: Post acute rehabilitation services        OLI Mane/KRISSY

## 2022-07-27 LAB
ANION GAP SERPL CALCULATED.3IONS-SCNC: 12 MMOL/L (ref 4–13)
BUN SERPL-MCNC: 13 MG/DL (ref 5–25)
CALCIUM SERPL-MCNC: 8.6 MG/DL (ref 8.3–10.1)
CHLORIDE SERPL-SCNC: 101 MMOL/L (ref 96–108)
CO2 SERPL-SCNC: 25 MMOL/L (ref 21–32)
CREAT SERPL-MCNC: 0.8 MG/DL (ref 0.6–1.3)
GFR SERPL CREATININE-BSD FRML MDRD: 81 ML/MIN/1.73SQ M
GLUCOSE SERPL-MCNC: 103 MG/DL (ref 65–140)
POTASSIUM SERPL-SCNC: 3.5 MMOL/L (ref 3.5–5.3)
SODIUM SERPL-SCNC: 138 MMOL/L (ref 135–147)

## 2022-07-27 PROCEDURE — 99232 SBSQ HOSP IP/OBS MODERATE 35: CPT | Performed by: STUDENT IN AN ORGANIZED HEALTH CARE EDUCATION/TRAINING PROGRAM

## 2022-07-27 PROCEDURE — 80048 BASIC METABOLIC PNL TOTAL CA: CPT | Performed by: STUDENT IN AN ORGANIZED HEALTH CARE EDUCATION/TRAINING PROGRAM

## 2022-07-27 RX ADMIN — ASPIRIN 81 MG: 81 TABLET, COATED ORAL at 08:34

## 2022-07-27 RX ADMIN — HEPARIN SODIUM 5000 UNITS: 5000 INJECTION INTRAVENOUS; SUBCUTANEOUS at 22:17

## 2022-07-27 RX ADMIN — AMLODIPINE BESYLATE 10 MG: 10 TABLET ORAL at 08:34

## 2022-07-27 RX ADMIN — BUTALBITAL, ACETAMINOPHEN AND CAFFEINE 1 TABLET: 50; 325; 40 TABLET ORAL at 09:13

## 2022-07-27 RX ADMIN — POTASSIUM CHLORIDE 20 MEQ: 1500 TABLET, EXTENDED RELEASE ORAL at 08:34

## 2022-07-27 RX ADMIN — HEPARIN SODIUM 5000 UNITS: 5000 INJECTION INTRAVENOUS; SUBCUTANEOUS at 05:10

## 2022-07-27 RX ADMIN — HEPARIN SODIUM 5000 UNITS: 5000 INJECTION INTRAVENOUS; SUBCUTANEOUS at 13:10

## 2022-07-27 NOTE — PLAN OF CARE
Problem: Potential for Falls  Goal: Patient will remain free of falls  Description: INTERVENTIONS:  - Educate patient/family on patient safety including physical limitations  - Instruct patient to call for assistance with activity   - Consult OT/PT to assist with strengthening/mobility   - Keep Call bell within reach  - Keep bed low and locked with side rails adjusted as appropriate  - Keep care items and personal belongings within reach  - Initiate and maintain comfort rounds  - Make Fall Risk Sign visible to staff  - Offer Toileting every 2 Hours, in advance of need  - Initiate/Maintain bed/chair alarm  - Obtain necessary fall risk management equipment:bed/chair alarm, call bell, walker, gripper socks, rounds  - Apply yellow socks and bracelet for high fall risk patients  - Consider moving patient to room near nurses station  Outcome: Progressing     Problem: Prexisting or High Potential for Compromised Skin Integrity  Goal: Skin integrity is maintained or improved  Description: INTERVENTIONS:  - Identify patients at risk for skin breakdown  - Assess and monitor skin integrity  - Assess and monitor nutrition and hydration status  - Monitor labs   - Assess for incontinence   - Instruct patient to turn and repostion every 2 hours while in bed and weight shift every 15 minutes while in chair  - Assist with mobility/ambulation  - Relieve pressure over bony prominences  - Avoid friction and shearing  - Provide appropriate hygiene as needed including keeping skin clean and dry  - Evaluate need for skin moisturizer/barrier cream  - Collaborate with interdisciplinary team   - Patient/family teaching  - Consider wound care consult   Outcome: Progressing     Problem: MOBILITY - ADULT  Goal: Maintain or return to baseline ADL function  Description: INTERVENTIONS:  -  Assess patient's ability to carry out ADLs; assess patient's baseline for ADL function and identify physical deficits which impact ability to perform ADLs (bathing, care of mouth/teeth, toileting, grooming, dressing, etc )  - Assess/evaluate cause of self-care deficits   - Assess range of motion  - Assess patient's mobility; develop plan if impaired  - Assess patient's need for assistive devices and provide as appropriate  - Encourage maximum independence but intervene and supervise when necessary  - Involve family in performance of ADLs  - Assess for home care needs following discharge   - Consider OT consult to assist with ADL evaluation and planning for discharge  - Provide patient education as appropriate  Outcome: Progressing  Goal: Maintains/Returns to pre admission functional level  Description: INTERVENTIONS:  - Perform BMAT or MOVE assessment daily    - Set and communicate daily mobility goal to care team and patient/family/caregiver  - Collaborate with rehabilitation services on mobility goals if consulted  - Perform Range of Motion  times a day  - Reposition patient every  hours    - Dangle patient  times a day  - Stand patient  times a day  - Ambulate patient  times a day  - Out of bed to chair  times a day   - Out of bed for meals  times a day  - Out of bed for toileting  - Record patient progress and toleration of activity level   Outcome: Progressing     Problem: INFECTION - ADULT  Goal: Absence or prevention of progression during hospitalization  Description: INTERVENTIONS:  - Assess and monitor for signs and symptoms of infection  - Monitor lab/diagnostic results  - Monitor all insertion sites, i e  indwelling lines, tubes, and drains  - Monitor endotracheal if appropriate and nasal secretions for changes in amount and color  - [place] appropriate cooling/warming therapies per order  - Administer medications as ordered  - Instruct and encourage patient and family to use good hand hygiene technique  - Identify and instruct in appropriate isolation precautions for identified infection/condition  Outcome: Progressing     Problem: DISCHARGE PLANNING  Goal: Discharge to home or other facility with appropriate resources  Description: INTERVENTIONS:  - Identify barriers to discharge w/patient and caregiver  - Arrange for needed discharge resources and transportation as appropriate  - Identify discharge learning needs (meds, wound care, etc )  - Arrange for interpretive services to assist at discharge as needed  - Refer to Case Management Department for coordinating discharge planning if the patient needs post-hospital services based on physician/advanced practitioner order or complex needs related to functional status, cognitive ability, or social support system  Outcome: Progressing     Problem: PAIN - ADULT  Goal: Verbalizes/displays adequate comfort level or baseline comfort level  Description: Interventions:  - Encourage patient to monitor pain and request assistance  - Assess pain using appropriate pain scale  - Administer analgesics based on type and severity of pain and evaluate response  - Implement non-pharmacological measures as appropriate and evaluate response  - Consider cultural and social influences on pain and pain management  - Notify physician/advanced practitioner if interventions unsuccessful or patient reports new pain  Outcome: Progressing     Problem: SKIN/TISSUE INTEGRITY - ADULT  Goal: Incision(s), wounds(s) or drain site(s) healing without S/S of infection  Description: INTERVENTIONS  - Assess and document dressing, incision, wound bed, drain sites and surrounding tissue  - Provide patient and family education  - Perform skin care/dressing changes every other day  Outcome: Progressing     Problem: CONFUSION/THOUGHT DISTURBANCE  Goal: Thought disturbances (confusion, delirium, depression, dementia or psychosis) are managed to maintain or return to baseline mental status and functional level  Description: INTERVENTIONS:  - Assess for possible contributors to  thought disturbance, including but not limited to medications, infection, impaired vision or hearing, underlying metabolic abnormalities, dehydration, respiratory compromise,  psychiatric diagnoses and notify attending PHYSICAN/AP  - Monitor and intervene to maintain adequate nutrition, hydration, elimination, sleep and activity  - Decrease environmental stimuli, including noise as appropriate  - Provide frequent contacts to provide refocusing, direction and reassurance as needed  Approach patient calmly with eye contact and at their level    - [place] high risk fall precautions, aspiration precautions and other safety measures, as indicated  - If delirium suspected, notify physician/AP of change in condition and request immediate in-person evaluation  - Pursue consults as appropriate including Geriatric (campus dependent), OT for cognitive evaluation/activity planning, psychiatric, pastoral care, etc   Outcome: Progressing

## 2022-07-27 NOTE — PLAN OF CARE
Problem: Potential for Falls  Goal: Patient will remain free of falls  Description: INTERVENTIONS:  - Educate patient/family on patient safety including physical limitations  - Instruct patient to call for assistance with activity   - Consult OT/PT to assist with strengthening/mobility   - Keep Call bell within reach  - Keep bed low and locked with side rails adjusted as appropriate  - Keep care items and personal belongings within reach  - Initiate and maintain comfort rounds  - Make Fall Risk Sign visible to staff  - Offer Toileting every 2 Hours, in advance of need  - Initiate/Maintain bed/chair alarm  - Obtain necessary fall risk management equipment:bed/chair alarm, call bell, walker, gripper socks, rounds  - Apply yellow socks and bracelet for high fall risk patients  - Consider moving patient to room near nurses station  Outcome: Progressing     Problem: Prexisting or High Potential for Compromised Skin Integrity  Goal: Skin integrity is maintained or improved  Description: INTERVENTIONS:  - Identify patients at risk for skin breakdown  - Assess and monitor skin integrity  - Assess and monitor nutrition and hydration status  - Monitor labs   - Assess for incontinence   - Instruct patient to turn and repostion every 2 hours while in bed and weight shift every 15 minutes while in chair  - Assist with mobility/ambulation  - Relieve pressure over bony prominences  - Avoid friction and shearing  - Provide appropriate hygiene as needed including keeping skin clean and dry  - Evaluate need for skin moisturizer/barrier cream  - Collaborate with interdisciplinary team   - Patient/family teaching  - Consider wound care consult   Outcome: Progressing     Problem: MOBILITY - ADULT  Goal: Maintain or return to baseline ADL function  Description: INTERVENTIONS:  -  Assess patient's ability to carry out ADLs; assess patient's baseline for ADL function and identify physical deficits which impact ability to perform ADLs (bathing, care of mouth/teeth, toileting, grooming, dressing, etc )  - Assess/evaluate cause of self-care deficits   - Assess range of motion  - Assess patient's mobility; develop plan if impaired  - Assess patient's need for assistive devices and provide as appropriate  - Encourage maximum independence but intervene and supervise when necessary  - Involve family in performance of ADLs  - Assess for home care needs following discharge   - Consider OT consult to assist with ADL evaluation and planning for discharge  - Provide patient education as appropriate  Outcome: Progressing  Goal: Maintains/Returns to pre admission functional level  Description: INTERVENTIONS:  - Perform BMAT or MOVE assessment daily    - Set and communicate daily mobility goal to care team and patient/family/caregiver  - Collaborate with rehabilitation services on mobility goals if consulted  - Perform Range of Motion  times a day  - Reposition patient every  hours    - Dangle patient  times a day  - Stand patient  times a day  - Ambulate patient  times a day  - Out of bed to chair  times a day   - Out of bed for meals  times a day  - Out of bed for toileting  - Record patient progress and toleration of activity level   Outcome: Progressing     Problem: INFECTION - ADULT  Goal: Absence or prevention of progression during hospitalization  Description: INTERVENTIONS:  - Assess and monitor for signs and symptoms of infection  - Monitor lab/diagnostic results  - Monitor all insertion sites, i e  indwelling lines, tubes, and drains  - Monitor endotracheal if appropriate and nasal secretions for changes in amount and color  - [place] appropriate cooling/warming therapies per order  - Administer medications as ordered  - Instruct and encourage patient and family to use good hand hygiene technique  - Identify and instruct in appropriate isolation precautions for identified infection/condition  Outcome: Progressing     Problem: DISCHARGE PLANNING  Goal: Discharge to home or other facility with appropriate resources  Description: INTERVENTIONS:  - Identify barriers to discharge w/patient and caregiver  - Arrange for needed discharge resources and transportation as appropriate  - Identify discharge learning needs (meds, wound care, etc )  - Arrange for interpretive services to assist at discharge as needed  - Refer to Case Management Department for coordinating discharge planning if the patient needs post-hospital services based on physician/advanced practitioner order or complex needs related to functional status, cognitive ability, or social support system  Outcome: Progressing     Problem: PAIN - ADULT  Goal: Verbalizes/displays adequate comfort level or baseline comfort level  Description: Interventions:  - Encourage patient to monitor pain and request assistance  - Assess pain using appropriate pain scale  - Administer analgesics based on type and severity of pain and evaluate response  - Implement non-pharmacological measures as appropriate and evaluate response  - Consider cultural and social influences on pain and pain management  - Notify physician/advanced practitioner if interventions unsuccessful or patient reports new pain  Outcome: Progressing     Problem: SKIN/TISSUE INTEGRITY - ADULT  Goal: Incision(s), wounds(s) or drain site(s) healing without S/S of infection  Description: INTERVENTIONS  - Assess and document dressing, incision, wound bed, drain sites and surrounding tissue  - Provide patient and family education  - Perform skin care/dressing changes every   Outcome: Progressing  Goal: Skin Integrity remains intact(Skin Breakdown Prevention)  Description: Assess:  -Perform Joseph assessment every   -Clean and moisturize skin every   -Inspect skin when repositioning, toileting, and assisting with ADLS  -Assess under medical devices such as  every   -Assess extremities for adequate circulation and sensation     Bed Management:  -Have minimal linens on bed & keep smooth, unwrinkled  -Change linens as needed when moist or perspiring  -Avoid sitting or lying in one position for more than  hours while in bed  -Keep HOB at degrees     Toileting:  -Offer bedside commode  -Assess for incontinence every   -Use incontinent care products after each incontinent episode such as     Activity:  -Mobilize patient  times a day  -Encourage activity and walks on unit  -Encourage or provide ROM exercises   -Turn and reposition patient every  Hours  -Use appropriate equipment to lift or move patient in bed  -Instruct/ Assist with weight shifting every  when out of bed in chair  -Consider limitation of chair time  hour intervals    Skin Care:  -Avoid use of baby powder, tape, friction and shearing, hot water or constrictive clothing  -Relieve pressure over bony prominences using   -Do not massage red bony areas    Next Steps:  -Teach patient strategies to minimize risks such as   -Consider consults to  interdisciplinary teams such as   Outcome: Progressing     Problem: GASTROINTESTINAL - ADULT  Goal: Maintains adequate nutritional intake  Description: INTERVENTIONS:  - Monitor percentage of each meal consumed  - Identify factors contributing to decreased intake, treat as appropriate  - Assist with meals as needed  - Monitor I&O, weight, and lab values if indicated  - Obtain nutrition services referral as needed  Outcome: Progressing     Problem: METABOLIC, FLUID AND ELECTROLYTES - ADULT  Goal: Electrolytes maintained within normal limits  Description: INTERVENTIONS:  - Monitor labs and assess patient for signs and symptoms of electrolyte imbalances  - Administer electrolyte replacement as ordered  - Monitor response to electrolyte replacements, including repeat lab results as appropriate  - Instruct patient on fluid and nutrition as appropriate  Outcome: Progressing  Goal: Glucose maintained within target range  Description: INTERVENTIONS:  - Monitor Blood Glucose as ordered  - Assess for signs and symptoms of hyperglycemia and hypoglycemia  - Administer ordered medications to maintain glucose within target range  - Assess nutritional intake and initiate nutrition service referral as needed  Outcome: Progressing     Problem: CONFUSION/THOUGHT DISTURBANCE  Goal: Thought disturbances (confusion, delirium, depression, dementia or psychosis) are managed to maintain or return to baseline mental status and functional level  Description: INTERVENTIONS:  - Assess for possible contributors to  thought disturbance, including but not limited to medications, infection, impaired vision or hearing, underlying metabolic abnormalities, dehydration, respiratory compromise,  psychiatric diagnoses and notify attending PHYSICAN/AP  - Monitor and intervene to maintain adequate nutrition, hydration, elimination, sleep and activity  - Decrease environmental stimuli, including noise as appropriate  - Provide frequent contacts to provide refocusing, direction and reassurance as needed  Approach patient calmly with eye contact and at their level    - [place] high risk fall precautions, aspiration precautions and other safety measures, as indicated  - If delirium suspected, notify physician/AP of change in condition and request immediate in-person evaluation  - Pursue consults as appropriate including Geriatric (campus dependent), OT for cognitive evaluation/activity planning, psychiatric, pastoral care, etc   Outcome: Progressing

## 2022-07-27 NOTE — ASSESSMENT & PLAN NOTE
Presented with left leg cellulitis, surrounding wounds, and growth on leg  · Maggots were found in the wound upon presentation to the ED  · Ct lower extremity: "Extensive subcutaneous edema likely representing cellulitis  Although evaluation for abscess is limited in the absence images contrast no obvious discrete collection is identified    Dermal soft tissue lesion seen in association with the posterior medial calf "  · Completed 7 days of IV antibiotics  · Blood cultures negative thus far

## 2022-07-27 NOTE — PROGRESS NOTES
[facility]  Progress Note - Chaparrita Valdez 1935, 82 y o  male MRN: [id number]  Unit/Bed#: [place] [address] -01 Encounter: [id number]  Primary Care Provider: No primary care provider on file  Date and time admitted to hospital: 7/9/2022 11:02 AM    * Cellulitis  Assessment & Plan  Presented with left leg cellulitis, surrounding wounds, and growth on leg  · Maggots were found in the wound upon presentation to the ED  · Ct lower extremity: "Extensive subcutaneous edema likely representing cellulitis  Although evaluation for abscess is limited in the absence images contrast no obvious discrete collection is identified  Dermal soft tissue lesion seen in association with the posterior medial calf "  · Completed 7 days of IV antibiotics  · Blood cultures negative thus far    Malignant melanoma of leg, left [facility])  Assessment & Plan  Pathology showing Melanoma  Discussed case with Oncology Luis Carlos Green MD   · Recommendations were: Pathology noted  F/u to be placed with   [facility] for melanoma medical oncology care  · CT CAP without any mets  · Plastics consulted per surgery recommendations for complete excision      COVID-19  Assessment & Plan  Detected to be COVID-19 positive on 07/20/2022  Asymptomatic    Will remain in the hospital in quarantine until July 31st    Encounter for competency evaluation  Assessment & Plan  · Neuropsychiatry consultation appreciated, at this point patient does not have capacity to make informed medical decisions    Hypertension  Assessment & Plan  Controlled  - Continue Amlodipine    Type 2 diabetes mellitus with hyperglycemia, without long-term current use of insulin ([facility] [address] )  Assessment & Plan  New onset diabetes mellitus-unclear with the this was undetected and detected as inpt here or this was precipitated by COVID infection  · Hemoglobin A1c checked and is 8 8  · Inpatient regimen: Sliding scale    Recent Labs     07/25/22  1121 07/25/22  1515 22  2101 22  0734 22  1112   POCGLU 124 143* 104 105 129         AKUA (acute kidney injury) ([facility] [address] )  Assessment & Plan  Resolved  VTE Pharmacologic Prophylaxis:   Pharmacologic: Heparin  Mechanical VTE Prophylaxis in Place: Yes    Discussions with Specialists or Other Care Team Provider: nursing    Education and Discussions with Family / Patient: patient, Lillis Libman    Time Spent for Care: 30 minutes  More than 50% of total time spent on counseling and coordination of care as described above  Current Length of Stay: 18 day(s)    Current Patient Status: Inpatient   Certification Statement: The patient will continue to require additional inpatient hospital stay due to plastics eval, placement    Discharge Plan: active    Code Status: Level 1 - Full Code      Subjective:   Patient seen and examined at bedside  Comfortable  No new complaints overnight  Objective:     Vitals:   Temp (24hrs), Av 8 °F (36 6 °C), Min:97 4 °F (36 3 °C), Max:98 3 °F (36 8 °C)    Temp:  [97 4 °F (36 3 °C)-98 3 °F (36 8 °C)] 98 3 °F (36 8 °C)  HR:  [84-88] 88  Resp:  [18-20] 18  BP: (142-154)/(70-74) 147/74  SpO2:  [90 %-95 %] 90 %  Body mass index is 29 79 kg/m²  Input and Output Summary (last 24 hours): Intake/Output Summary (Last 24 hours) at 2022 1017  Last data filed at 2022 0435  Gross per 24 hour   Intake --   Output 100 ml   Net -100 ml       Physical Exam:     Physical Exam  Vitals reviewed  HENT:      Head: Normocephalic  Nose: Nose normal       Mouth/Throat:      Mouth: Mucous membranes are moist    Eyes:      General: No scleral icterus  Cardiovascular:      Rate and Rhythm: Normal rate  Pulmonary:      Effort: Pulmonary effort is normal  No respiratory distress  Abdominal:      Palpations: Abdomen is soft  Tenderness: There is no abdominal tenderness  Musculoskeletal:      Comments: Left leg wrapped   Skin:     General: Skin is warm     Neurological:      Mental Status: He is alert  Mental status is at baseline  Psychiatric:         Mood and Affect: Mood normal          Behavior: Behavior normal        Additional Data:     Labs:    Results from last 7 days   Lab Units 07/23/22  0526   WBC Thousand/uL 7 01   HEMOGLOBIN g/dL 15 6   HEMATOCRIT % 47 6   PLATELETS Thousands/uL 212   NEUTROS PCT % 57   LYMPHS PCT % 31   MONOS PCT % 10   EOS PCT % 2     Results from last 7 days   Lab Units 07/27/22  0435   SODIUM mmol/L 138   POTASSIUM mmol/L 3 5   CHLORIDE mmol/L 101   CO2 mmol/L 25   BUN mg/dL 13   CREATININE mg/dL 0 80   ANION GAP mmol/L 12   CALCIUM mg/dL 8 6   GLUCOSE RANDOM mg/dL 103         Results from last 7 days   Lab Units 07/26/22  1112 07/26/22  0734 07/25/22  2101 07/25/22  1515 07/25/22  1121 07/21/22  2138 07/21/22  1453 07/21/22  1052 07/21/22  0717 07/20/22  2103 07/20/22  1608 07/20/22  1111   POC GLUCOSE mg/dl 129 105 104 143* 124 115 153* 165* 133 144* 135 125                   * I Have Reviewed All Lab Data Listed Above  * Additional Pertinent Lab Tests Reviewed:  [facility] Admission Reviewed    Imaging:    Imaging Reports Reviewed Today Include: ct cap    Recent Cultures (last 7 days):           Last 24 Hours Medication List:   Current Facility-Administered Medications   Medication Dose Route Frequency Provider Last Rate    acetaminophen  650 mg Oral Q6H PRN Vanessa Kim DO      al mag oxide-diphenhydramine-lidocaine viscous  10 mL Swish & Swallow Q6H PRN Cody Tillman MD      amLODIPine  10 mg Oral Daily Oc Chapman MD      aspirin  81 mg Oral Daily Oc Chapman MD      butalbital-acetaminophen-caffeine  1 tablet Oral Q4H PRN Cody Tillman MD      heparin (porcine)  5,000 Units Subcutaneous Q8H [address] Vanessahaile Kim DO      oxyCODONE  2 5 mg Oral Q8H PRN Judge Ismael DO      potassium chloride  20 mEq Oral Daily Keisha Cuellar MD          Today, Patient Was Seen By: Alessandra Dixon MD    ** Please Note: Dictation voice to text software may have been used in the creation of this document   **

## 2022-07-27 NOTE — ASSESSMENT & PLAN NOTE
Pathology showing Melanoma  Discussed case with Oncology Pat Linares MD   · Recommendations were: Pathology noted  F/u to be placed with Dr Mercedez Mancini for melanoma medical oncology care  · CT CAP without any mets    · Plastics consulted per surgery recommendations for complete excision

## 2022-07-28 PROBLEM — F32.A DEPRESSION: Status: ACTIVE | Noted: 2022-01-01

## 2022-07-28 PROCEDURE — G0425 INPT/ED TELECONSULT30: HCPCS | Performed by: PSYCHIATRY & NEUROLOGY

## 2022-07-28 PROCEDURE — 99232 SBSQ HOSP IP/OBS MODERATE 35: CPT | Performed by: STUDENT IN AN ORGANIZED HEALTH CARE EDUCATION/TRAINING PROGRAM

## 2022-07-28 RX ORDER — MIRTAZAPINE 15 MG/1
15 TABLET, FILM COATED ORAL
Status: DISCONTINUED | OUTPATIENT
Start: 2022-07-28 | End: 2022-08-05 | Stop reason: HOSPADM

## 2022-07-28 RX ADMIN — BUTALBITAL, ACETAMINOPHEN AND CAFFEINE 1 TABLET: 50; 325; 40 TABLET ORAL at 21:09

## 2022-07-28 RX ADMIN — HEPARIN SODIUM 5000 UNITS: 5000 INJECTION INTRAVENOUS; SUBCUTANEOUS at 21:09

## 2022-07-28 RX ADMIN — ASPIRIN 81 MG: 81 TABLET, COATED ORAL at 08:01

## 2022-07-28 RX ADMIN — AMLODIPINE BESYLATE 10 MG: 10 TABLET ORAL at 08:01

## 2022-07-28 RX ADMIN — HEPARIN SODIUM 5000 UNITS: 5000 INJECTION INTRAVENOUS; SUBCUTANEOUS at 05:43

## 2022-07-28 RX ADMIN — MIRTAZAPINE 15 MG: 15 TABLET, FILM COATED ORAL at 21:09

## 2022-07-28 RX ADMIN — HEPARIN SODIUM 5000 UNITS: 5000 INJECTION INTRAVENOUS; SUBCUTANEOUS at 13:23

## 2022-07-28 RX ADMIN — POTASSIUM CHLORIDE 20 MEQ: 1500 TABLET, EXTENDED RELEASE ORAL at 08:01

## 2022-07-28 NOTE — PROGRESS NOTES
[address]  Progress Note - Martha Narayanan 1935, 82 y o  male MRN: [id number]  Unit/Bed#: [place] [address] -01 Encounter: [id number]  Primary Care Provider: No primary care provider on file  Date and time admitted to hospital: 7/9/2022 11:02 AM    * Cellulitis  Assessment & Plan  Presented with left leg cellulitis, surrounding wounds, and growth on leg  · Maggots were found in the wound upon presentation to the ED  · Ct lower extremity: "Extensive subcutaneous edema likely representing cellulitis  Although evaluation for abscess is limited in the absence images contrast no obvious discrete collection is identified  Dermal soft tissue lesion seen in association with the posterior medial calf "  · Completed 7 days of IV antibiotics  · Blood cultures negative thus far    Depression  Assessment & Plan  Psych eval pending    Malignant melanoma of leg, left [facility])  Assessment & Plan  Pathology showing Melanoma  Discussed case with Oncology Janeth Klein MD   · Recommendations were: Pathology noted  F/u to be placed with Dr Ivan Rob for melanoma medical oncology care  · CT CAP without any mets  · Plastics consulted per surgery recommendations for complete excision to be done outpatient    COVID-19  Assessment & Plan  Detected to be COVID-19 positive on 07/20/2022  Asymptomatic    Will remain in the hospital in quarantine until July 31st    Encounter for competency evaluation  Assessment & Plan  · Neuropsychiatry consultation appreciated, at this point patient does not have capacity to make informed medical decisions    Hypertension  Assessment & Plan  Controlled  - Continue Amlodipine    Type 2 diabetes mellitus with hyperglycemia, without long-term current use of insulin ([facility] [place] [address] )  Assessment & Plan  New onset diabetes mellitus-unclear with the this was undetected and detected as inpt here or this was precipitated by COVID infection  · Hemoglobin A1c checked and is 8  8  · Inpatient regimen: Sliding scale    Recent Labs     22  1121 22  1515 22  2101 22  0734 22  1112   POCGLU 124 143* 104 105 129         AKUA (acute kidney injury) ([facility] [address] )  Assessment & Plan  Resolved  VTE Pharmacologic Prophylaxis:   Pharmacologic: Heparin  Mechanical VTE Prophylaxis in Place: Yes    Discussions with Specialists or Other Care Team Provider: nursing    Education and Discussions with Family / Patient: patient    Time Spent for Care: 30 minutes  More than 50% of total time spent on counseling and coordination of care as described above  Current Length of Stay: 19 day(s)    Current Patient Status: Inpatient   Certification Statement: The patient will continue to require additional inpatient hospital stay due to placement, psych eval    Discharge Plan:     Code Status: Level 1 - Full Code      Subjective:   Patient seen and examined at bedside  Comfortable  No new complaints overnight  Objective:     Vitals:   Temp (24hrs), Av 4 °F (36 9 °C), Min:98 1 °F (36 7 °C), Max:98 7 °F (37 1 °C)    Temp:  [98 1 °F (36 7 °C)-98 7 °F (37 1 °C)] 98 2 °F (36 8 °C)  HR:  [90-99] 99  Resp:  [18] 18  BP: (144-171)/(74-94) 171/94  SpO2:  [90 %-95 %] 95 %  Body mass index is 29 79 kg/m²  Input and Output Summary (last 24 hours): Intake/Output Summary (Last 24 hours) at 2022 1103  Last data filed at 2022 0600  Gross per 24 hour   Intake --   Output 250 ml   Net -250 ml       Physical Exam:     Physical Exam  Vitals reviewed  Constitutional:       General: He is not in acute distress  HENT:      Head: Normocephalic  Nose: Nose normal       Mouth/Throat:      Mouth: Mucous membranes are moist    Eyes:      General: No scleral icterus  Cardiovascular:      Rate and Rhythm: Normal rate  Pulmonary:      Effort: Pulmonary effort is normal  No respiratory distress  Abdominal:      Palpations: Abdomen is soft  Tenderness:  There is no abdominal tenderness  Skin:     General: Skin is warm  Neurological:      Mental Status: He is alert  Mental status is at baseline  Psychiatric:         Mood and Affect: Mood normal          Behavior: Behavior normal        Additional Data:     Labs:    Results from last 7 days   Lab Units 07/23/22  0526   WBC Thousand/uL 7 01   HEMOGLOBIN g/dL 15 6   HEMATOCRIT % 47 6   PLATELETS Thousands/uL 212   NEUTROS PCT % 57   LYMPHS PCT % 31   MONOS PCT % 10   EOS PCT % 2     Results from last 7 days   Lab Units 07/27/22  0435   SODIUM mmol/L 138   POTASSIUM mmol/L 3 5   CHLORIDE mmol/L 101   CO2 mmol/L 25   BUN mg/dL 13   CREATININE mg/dL 0 80   ANION GAP mmol/L 12   CALCIUM mg/dL 8 6   GLUCOSE RANDOM mg/dL 103         Results from last 7 days   Lab Units 07/26/22  1112 07/26/22  0734 07/25/22  2101 07/25/22  1515 07/25/22  1121 07/21/22  2138 07/21/22  1453   POC GLUCOSE mg/dl 129 105 104 143* 124 115 153*                   * I Have Reviewed All Lab Data Listed Above  * Additional Pertinent Lab Tests Reviewed:  [address] Admission Reviewed    Imaging:    Imaging Reports Reviewed Today Include: no new imaging    Recent Cultures (last 7 days):           Last 24 Hours Medication List:   Current Facility-Administered Medications   Medication Dose Route Frequency Provider Last Rate    acetaminophen  650 mg Oral Q6H PRN Vanessa Kim DO      al mag oxide-diphenhydramine-lidocaine viscous  10 mL Swish & Swallow Q6H PRN Renard Flores MD      amLODIPine  10 mg Oral Daily Jolie Mosley MD      aspirin  81 mg Oral Daily Jolie Mosley MD      butalbital-acetaminophen-caffeine  1 tablet Oral Q4H PRN Renard Flores MD      heparin (porcine)  5,000 Units Subcutaneous Q8H [address] Vnaessa Kim DO      oxyCODONE  2 5 mg Oral Q8H PRN Vern Peguero DO      potassium chloride  20 mEq Oral Daily Chito Gross MD          Today, Patient Was Seen By: Meme Knutson MD    ** Please Note: Dictation voice to text software may have been used in the creation of this document   **

## 2022-07-28 NOTE — TELEMEDICINE
TeleConsultation - [address]  82 y o  male MRN: [id number]  Unit/Bed#: [place] [address] -01 Encounter: [id number]        REQUIRED DOCUMENTATION:     1  This service was provided via Telemedicine  2  Provider located at [place]  3  TeleMed provider: Melissa Enamorado MD   4  Identify all parties in room with patient during tele consult:  Patient  5  Patient was then informed that this was a Telemedicine visit and that the exam was being conducted confidentially over secure lines  My office door was closed  No one else was in the room  Patient acknowledged consent and understanding of privacy and security of the Telemedicine visit, and gave us permission to have the assistant stay in the room in order to assist with the history and to conduct the exam   I informed the patient that I have reviewed their record in Epic and presented the opportunity for them to ask any questions regarding the visit today  The patient agreed to participate  Assessment/Plan     Assessment:  Unspecified mood disorder; rule out adjustment disorder with depressed mood; rule out major depression; rule out mood disorder secondary to other physiological condition  Plan:   Risks, benefits and possible side effects of Medications:   Risks, benefits, and possible side effects of medications explained to patient and patient verbalizes understanding  Recommend to consider Remeron 15 mg p o  q h s  for depression and to further support appetite  This may be further titrated if indicated to effect as tolerated  There is no psychiatric contraindication for transfer to rehab when medically cleared to do so otherwise  Re-consult Psychiatry as needed  No suicide precautions are indicated this time      Chief Complaint:  I am fine    History of Present Illness     Reason for Consult / Principal Problem:  Depression    Patient is a 82 y o  male who presented to the emergency department with provider documented the following: Jeneane Burkitt presents to the ER for evaluation of a left leg  Patient states he has had a growth on his left leg for the past few years however it has significantly worsened  Patient states that he was seen a couple months ago and was given antibiotics which he finished  Patient's daughter states that her and her sister over to change the dressing however recently the patient has not been letting them change the dressing  States that he was supposed to follow-up with dermatology however has not  Patient is unsure of any past medical history  Patient's daughter states that the patient is not taking any medications as when his family doctor , the patient throughout his medications and has not been restarted on them  Patient denies any other complaints at this time  Denies any headaches, dizziness, fevers, chest pain, shortness of breath, numbness, tingling, weakness, or any other concerning symptoms              Prior to Admission Medications   Prescriptions Last Dose Informant Patient Reported? Taking?   carbamide peroxide (DEBROX) 6 5 % otic solution Not Taking at Unknown time   No No   Sig: Administer 5 drops into ears 2 (two) times a day   Patient not taking: Reported on 2022      Facility-Administered Medications: None         Medical History[]Expand by Default        Past Medical History:   Diagnosis Date    Hx of heart artery stent              Surgical History[]Expand by Default   Past Surgical History:   Procedure Laterality Date    BACK SURGERY                History reviewed  No pertinent family history  I have reviewed and agree with the history as documented      E-Cigarette/Vaping      E-Cigarette/Vaping Substances      Social History           Tobacco Use    Smoking status: Former Smoker    Smokeless tobacco: Never Used   Substance Use Topics    Alcohol use: Never    Drug use: Never         Review of Systems   Constitutional: Negative for chills and fever  HENT: Negative for congestion and sore throat  Respiratory: Negative for cough and shortness of breath  Cardiovascular: Negative for chest pain  Gastrointestinal: Negative for abdominal pain, diarrhea, nausea and vomiting  Genitourinary: Negative for dysuria  Musculoskeletal: Negative for back pain  Skin: Positive for wound  Negative for rash  Neurological: Negative for headaches  All other systems reviewed and are negative  Currently the patient is being treated for cellulitis, malignant melanoma of left leg, COVID-19, hypertension, type 2 diabetes mellitus with hyperglycemia without long-term current use of insulin and acute kidney injury  Neuropsychology has noted that the patient does not have capacity to make informed medical decisions  Nursing reports that the patient has been irritable but has been cooperative with care  He has been oriented x3  Department aging is reportedly involved  The plan is for the patient to go to rehab after he is cleared of his COVID infection  The patient has no psychiatric complaints at this time  Past psychiatric history:  Unremarkable per patient     Social history:  The patient reports he is single and has 2 very supportive daughters  He states he lives alone in everything's fine at home  He reports no abuse history or emotional trauma  Family history:  Unremarkable per patient     Substance use history:  As above     Mental status examination:  The patient is alert and oriented to person, hospital and date  He denies having psychiatric concerns  He describes his mood as pretty good and denies depression  Speech is unremarkable  Sensorium is clear  Thought process is logical linear  Thought content is reality based  Associations were tight at this time  Neuropsychology identified problems with the patient's cognitive capacity to make informed healthcare decisions  Patient denies suicidal homicidal ideation    He denies hallucinations and other psychotic features  Insight and judgment are limited secondary to limitations of his current cognitive capacity  Inpatient consult to Psychiatry  Consult performed by: Gina Moon MD  Consult ordered by: Azael Molina MD            Past Medical History:   Diagnosis Date    Hx of heart artery stent        Medical Review Of Systems:  Review of Systems    Meds/Allergies   all current active meds have been reviewed  No Known Allergies    Objective   Vital signs in last 24 hours:  Temp:  [98 1 °F (36 7 °C)-98 7 °F (37 1 °C)] 98 2 °F (36 8 °C)  HR:  [90-99] 99  Resp:  [18] 18  BP: (144-171)/(74-94) 171/94      Intake/Output Summary (Last 24 hours) at 7/28/2022 1157  Last data filed at 7/28/2022 0600  Gross per 24 hour   Intake --   Output 250 ml   Net -250 ml         Lab Results:  Reviewed  Imaging Studies:  Reviewed  EKG, Pathology, and Other Studies:  Reviewed    Code Status: Level 1 - Full Code  Advance Directive and Living Will:      Power of :    POLST:      Counseling / Coordination of Care  Total floor / unit time spent today 30 minutes  Greater than 50% of total time was spent with the patient and / or family counseling and / or coordination of care  A description of the counseling / coordination of care:  Chart review, patient evaluation, coordination and communication with nursing, staff and provider

## 2022-07-28 NOTE — PLAN OF CARE
Problem: Potential for Falls  Goal: Patient will remain free of falls  Description: INTERVENTIONS:  - Educate patient/family on patient safety including physical limitations  - Instruct patient to call for assistance with activity   - Consult OT/PT to assist with strengthening/mobility   - Keep Call bell within reach  - Keep bed low and locked with side rails adjusted as appropriate  - Keep care items and personal belongings within reach  - Initiate and maintain comfort rounds  - Make Fall Risk Sign visible to staff  - Offer Toileting every 2 Hours, in advance of need  - Initiate/Maintain bed/chair alarm  - Obtain necessary fall risk management equipment:bed/chair alarm, call bell, walker, gripper socks, rounds  - Apply yellow socks and bracelet for high fall risk patients  - Consider moving patient to room near nurses station  Outcome: Progressing     Problem: Prexisting or High Potential for Compromised Skin Integrity  Goal: Skin integrity is maintained or improved  Description: INTERVENTIONS:  - Identify patients at risk for skin breakdown  - Assess and monitor skin integrity  - Assess and monitor nutrition and hydration status  - Monitor labs   - Assess for incontinence   - Instruct patient to turn and repostion every 2 hours while in bed and weight shift every 15 minutes while in chair  - Assist with mobility/ambulation  - Relieve pressure over bony prominences  - Avoid friction and shearing  - Provide appropriate hygiene as needed including keeping skin clean and dry  - Evaluate need for skin moisturizer/barrier cream  - Collaborate with interdisciplinary team   - Patient/family teaching  - Consider wound care consult   Outcome: Progressing     Problem: MOBILITY - ADULT  Goal: Maintain or return to baseline ADL function  Description: INTERVENTIONS:  -  Assess patient's ability to carry out ADLs; assess patient's baseline for ADL function and identify physical deficits which impact ability to perform ADLs (bathing, care of mouth/teeth, toileting, grooming, dressing, etc )  - Assess/evaluate cause of self-care deficits   - Assess range of motion  - Assess patient's mobility; develop plan if impaired  - Assess patient's need for assistive devices and provide as appropriate  - Encourage maximum independence but intervene and supervise when necessary  - Involve family in performance of ADLs  - Assess for home care needs following discharge   - Consider OT consult to assist with ADL evaluation and planning for discharge  - Provide patient education as appropriate  Outcome: Progressing  Goal: Maintains/Returns to pre admission functional level  Description: INTERVENTIONS:  - Perform BMAT or MOVE assessment daily    - Set and communicate daily mobility goal to care team and patient/family/caregiver     - Collaborate with rehabilitation services on mobility goals if consulted  - Out of bed for toileting  - Record patient progress and toleration of activity level   Outcome: Progressing     Problem: INFECTION - ADULT  Goal: Absence or prevention of progression during hospitalization  Description: INTERVENTIONS:  - Assess and monitor for signs and symptoms of infection  - Monitor lab/diagnostic results  - Monitor all insertion sites, i e  indwelling lines, tubes, and drains  - Monitor endotracheal if appropriate and nasal secretions for changes in amount and color  - [place] appropriate cooling/warming therapies per order  - Administer medications as ordered  - Instruct and encourage patient and family to use good hand hygiene technique  - Identify and instruct in appropriate isolation precautions for identified infection/condition  Outcome: Progressing     Problem: DISCHARGE PLANNING  Goal: Discharge to home or other facility with appropriate resources  Description: INTERVENTIONS:  - Identify barriers to discharge w/patient and caregiver  - Arrange for needed discharge resources and transportation as appropriate  - Identify discharge learning needs (meds, wound care, etc )  - Arrange for interpretive services to assist at discharge as needed  - Refer to Case Management Department for coordinating discharge planning if the patient needs post-hospital services based on physician/advanced practitioner order or complex needs related to functional status, cognitive ability, or social support system  Outcome: Progressing     Problem: SKIN/TISSUE INTEGRITY - ADULT  Goal: Incision(s), wounds(s) or drain site(s) healing without S/S of infection  Description: INTERVENTIONS  - Assess and document dressing, incision, wound bed, drain sites and surrounding tissue  - Provide patient and family education  - Perform skin care/dressing changes every other day  Outcome: Progressing  Goal: Skin Integrity remains intact(Skin Breakdown Prevention)  Description: Assess:  -Perform Joseph assessment every shift  -Clean and moisturize skin every shift  -Inspect skin when repositioning, toileting, and assisting with ADLS  -Assess extremities for adequate circulation and sensation     Bed Management:  -Have minimal linens on bed & keep smooth, unwrinkled  -Change linens as needed when moist or perspiring  -Avoid sitting or lying in one position for more than 2 hours while in bed      Toileting:  -Offer bedside commode  -Assess for incontinence every 2 hours  -Use incontinent care products after each incontinent episode such as moisturized wipes    Activity:  -Encourage activity and walks on unit  -Encourage or provide ROM exercises   -Turn and reposition patient every 2 Hours  -Use appropriate equipment to lift or move patient in bed    Skin Care:  -Avoid use of baby powder, tape, friction and shearing, hot water or constrictive clothing  -Relieve pressure over bony prominences using waffle cushion  -Do not massage red bony areas    Next Steps:  -Teach patient strategies to minimize risks such as calling before attempting to get out of bed  Outcome: Progressing     Problem: PAIN - ADULT  Goal: Verbalizes/displays adequate comfort level or baseline comfort level  Description: Interventions:  - Encourage patient to monitor pain and request assistance  - Assess pain using appropriate pain scale  - Administer analgesics based on type and severity of pain and evaluate response  - Implement non-pharmacological measures as appropriate and evaluate response  - Consider cultural and social influences on pain and pain management  - Notify physician/advanced practitioner if interventions unsuccessful or patient reports new pain  Outcome: Progressing     Problem: CONFUSION/THOUGHT DISTURBANCE  Goal: Thought disturbances (confusion, delirium, depression, dementia or psychosis) are managed to maintain or return to baseline mental status and functional level  Description: INTERVENTIONS:  - Assess for possible contributors to  thought disturbance, including but not limited to medications, infection, impaired vision or hearing, underlying metabolic abnormalities, dehydration, respiratory compromise,  psychiatric diagnoses and notify attending PHYSICAN/AP  - Monitor and intervene to maintain adequate nutrition, hydration, elimination, sleep and activity  - Decrease environmental stimuli, including noise as appropriate  - Provide frequent contacts to provide refocusing, direction and reassurance as needed  Approach patient calmly with eye contact and at their level    - [place] high risk fall precautions, aspiration precautions and other safety measures, as indicated  - If delirium suspected, notify physician/AP of change in condition and request immediate in-person evaluation  - Pursue consults as appropriate including Geriatric (campus dependent), OT for cognitive evaluation/activity planning, psychiatric, pastoral care, etc   Outcome: Progressing     Problem: GASTROINTESTINAL - ADULT  Goal: Maintains adequate nutritional intake  Description: INTERVENTIONS:  - Monitor percentage of each meal consumed  - Identify factors contributing to decreased intake, treat as appropriate  - Assist with meals as needed  - Monitor I&O, weight, and lab values if indicated  - Obtain nutrition services referral as needed  Outcome: Progressing     Problem: METABOLIC, FLUID AND ELECTROLYTES - ADULT  Goal: Electrolytes maintained within normal limits  Description: INTERVENTIONS:  - Monitor labs and assess patient for signs and symptoms of electrolyte imbalances  - Administer electrolyte replacement as ordered  - Monitor response to electrolyte replacements, including repeat lab results as appropriate  - Instruct patient on fluid and nutrition as appropriate  Outcome: Progressing  Goal: Glucose maintained within target range  Description: INTERVENTIONS:  - Monitor Blood Glucose as ordered  - Assess for signs and symptoms of hyperglycemia and hypoglycemia  - Administer ordered medications to maintain glucose within target range  - Assess nutritional intake and initiate nutrition service referral as needed  Outcome: Progressing

## 2022-07-28 NOTE — ASSESSMENT & PLAN NOTE
Pathology showing Melanoma  Discussed case with Oncology Riesa Angelucci, MD   · Recommendations were: Pathology noted  F/u to be placed with Dr Mark Neumann for melanoma medical oncology care  · CT CAP without any mets    · Plastics consulted per surgery recommendations for complete excision to be done outpatient

## 2022-07-28 NOTE — PLAN OF CARE
Problem: Potential for Falls  Goal: Patient will remain free of falls  Description: INTERVENTIONS:  - Educate patient/family on patient safety including physical limitations  - Instruct patient to call for assistance with activity   - Consult OT/PT to assist with strengthening/mobility   - Keep Call bell within reach  - Keep bed low and locked with side rails adjusted as appropriate  - Keep care items and personal belongings within reach  - Initiate and maintain comfort rounds  - Make Fall Risk Sign visible to staff  - Offer Toileting every 2 Hours, in advance of need  - Initiate/Maintain bed/chair alarm  - Obtain necessary fall risk management equipment:bed/chair alarm, call bell, walker, gripper socks, rounds  - Apply yellow socks and bracelet for high fall risk patients  - Consider moving patient to room near nurses station  7/28/2022 0724 by Meng Longoria RN  Outcome: Progressing  7/28/2022 0724 by Meng Longoria RN  Outcome: Progressing     Problem: Prexisting or High Potential for Compromised Skin Integrity  Goal: Skin integrity is maintained or improved  Description: INTERVENTIONS:  - Identify patients at risk for skin breakdown  - Assess and monitor skin integrity  - Assess and monitor nutrition and hydration status  - Monitor labs   - Assess for incontinence   - Instruct patient to turn and repostion every 2 hours while in bed and weight shift every 15 minutes while in chair  - Assist with mobility/ambulation  - Relieve pressure over bony prominences  - Avoid friction and shearing  - Provide appropriate hygiene as needed including keeping skin clean and dry  - Evaluate need for skin moisturizer/barrier cream  - Collaborate with interdisciplinary team   - Patient/family teaching  - Consider wound care consult   7/28/2022 0724 by Meng Longoria RN  Outcome: Progressing  7/28/2022 0724 by Meng Longoria RN  Outcome: Progressing     Problem: MOBILITY - ADULT  Goal: Maintain or return to baseline ADL function  Description: INTERVENTIONS:  -  Assess patient's ability to carry out ADLs; assess patient's baseline for ADL function and identify physical deficits which impact ability to perform ADLs (bathing, care of mouth/teeth, toileting, grooming, dressing, etc )  - Assess/evaluate cause of self-care deficits   - Assess range of motion  - Assess patient's mobility; develop plan if impaired  - Assess patient's need for assistive devices and provide as appropriate  - Encourage maximum independence but intervene and supervise when necessary  - Involve family in performance of ADLs  - Assess for home care needs following discharge   - Consider OT consult to assist with ADL evaluation and planning for discharge  - Provide patient education as appropriate  7/28/2022 0724 by Kenneth Juárez RN  Outcome: Progressing  7/28/2022 0724 by Kenneth Juárez RN  Outcome: Progressing  Goal: Maintains/Returns to pre admission functional level  Description: INTERVENTIONS:  - Perform BMAT or MOVE assessment daily    - Set and communicate daily mobility goal to care team and patient/family/caregiver  - Collaborate with rehabilitation services on mobility goals if consulted  - Perform Range of Motion  times a day  - Reposition patient every hours    - Dangle patient  times a day  - Stand patient times a day  - Ambulate patient  times a day  - Out of bed to chair  times a day   - Out of bed for meals  times a day  - Out of bed for toileting  - Record patient progress and toleration of activity level   7/28/2022 0724 by Kenneth Juárez RN  Outcome: Progressing  7/28/2022 0724 by Kenneth Juárez RN  Outcome: Progressing     Problem: INFECTION - ADULT  Goal: Absence or prevention of progression during hospitalization  Description: INTERVENTIONS:  - Assess and monitor for signs and symptoms of infection  - Monitor lab/diagnostic results  - Monitor all insertion sites, i e  indwelling lines, tubes, and drains  - Monitor endotracheal if appropriate and nasal secretions for changes in amount and color  - [place] appropriate cooling/warming therapies per order  - Administer medications as ordered  - Instruct and encourage patient and family to use good hand hygiene technique  - Identify and instruct in appropriate isolation precautions for identified infection/condition  7/28/2022 0724 by Maria M Adams RN  Outcome: Progressing  7/28/2022 0724 by Maria M Adams RN  Outcome: Progressing     Problem: DISCHARGE PLANNING  Goal: Discharge to home or other facility with appropriate resources  Description: INTERVENTIONS:  - Identify barriers to discharge w/patient and caregiver  - Arrange for needed discharge resources and transportation as appropriate  - Identify discharge learning needs (meds, wound care, etc )  - Arrange for interpretive services to assist at discharge as needed  - Refer to Case Management Department for coordinating discharge planning if the patient needs post-hospital services based on physician/advanced practitioner order or complex needs related to functional status, cognitive ability, or social support system  7/28/2022 0724 by Maria M Adams RN  Outcome: Progressing  7/28/2022 0724 by Maria M Adams RN  Outcome: Progressing     Problem: SKIN/TISSUE INTEGRITY - ADULT  Goal: Incision(s), wounds(s) or drain site(s) healing without S/S of infection  Description: INTERVENTIONS  - Assess and document dressing, incision, wound bed, drain sites and surrounding tissue  - Provide patient and family education  - Perform skin care/dressing changes mika  7/28/2022 0724 by Maria M Adams RN  Outcome: Progressing  7/28/2022 0724 by Maria M Adams RN  Outcome: Progressing  Goal: Skin Integrity remains intact(Skin Breakdown Prevention)  Description: Assess:  -Perform Joseph assessment every   -Clean and moisturize skin every   -Inspect skin when repositioning, toileting, and assisting with ADLS  -Assess under medical devices such as every   -Assess extremities for adequate circulation and sensation     Bed Management:  -Have minimal linens on bed & keep smooth, unwrinkled  -Change linens as needed when moist or perspiring  -Avoid sitting or lying in one position for more than  hours while in bed  -Keep HOB at degrees     Toileting:  -Offer bedside commode  -Assess for incontinence every   -Use incontinent care products after each incontinent episode such as     Activity:  -Mobilize patient  times a day  -Encourage activity and walks on unit  -Encourage or provide ROM exercises   -Turn and reposition patient every  Hours  -Use appropriate equipment to lift or move patient in bed  -Instruct/ Assist with weight shifting every when out of bed in chair  -Consider limitation of chair time  hour intervals    Skin Care:  -Avoid use of baby powder, tape, friction and shearing, hot water or constrictive clothing  -Relieve pressure over bony prominences using   -Do not massage red bony areas    Next Steps:  -Teach patient strategies to minimize risks such as    -Consider consults to  interdisciplinary teams such as   7/28/2022 0724 by Jaquelin Henriquez RN  Outcome: Progressing  7/28/2022 0724 by Jaquelin Henriquez RN  Outcome: Progressing     Problem: PAIN - ADULT  Goal: Verbalizes/displays adequate comfort level or baseline comfort level  Description: Interventions:  - Encourage patient to monitor pain and request assistance  - Assess pain using appropriate pain scale  - Administer analgesics based on type and severity of pain and evaluate response  - Implement non-pharmacological measures as appropriate and evaluate response  - Consider cultural and social influences on pain and pain management  - Notify physician/advanced practitioner if interventions unsuccessful or patient reports new pain  7/28/2022 0724 by Jaquelin Henriquez RN  Outcome: Progressing  7/28/2022 0724 by Jaquelin Henriquez RN  Outcome: Progressing     Problem: CONFUSION/THOUGHT DISTURBANCE  Goal: Thought disturbances (confusion, delirium, depression, dementia or psychosis) are managed to maintain or return to baseline mental status and functional level  Description: INTERVENTIONS:  - Assess for possible contributors to  thought disturbance, including but not limited to medications, infection, impaired vision or hearing, underlying metabolic abnormalities, dehydration, respiratory compromise,  psychiatric diagnoses and notify attending PHYSICAN/AP  - Monitor and intervene to maintain adequate nutrition, hydration, elimination, sleep and activity  - Decrease environmental stimuli, including noise as appropriate  - Provide frequent contacts to provide refocusing, direction and reassurance as needed  Approach patient calmly with eye contact and at their level    - [place] high risk fall precautions, aspiration precautions and other safety measures, as indicated  - If delirium suspected, notify physician/AP of change in condition and request immediate in-person evaluation  - Pursue consults as appropriate including Geriatric (campus dependent), OT for cognitive evaluation/activity planning, psychiatric, pastoral care, etc   7/28/2022 0724 by Kenneth Juárez RN  Outcome: Progressing  7/28/2022 0724 by Kenneth Juárez RN  Outcome: Progressing     Problem: GASTROINTESTINAL - ADULT  Goal: Maintains adequate nutritional intake  Description: INTERVENTIONS:  - Monitor percentage of each meal consumed  - Identify factors contributing to decreased intake, treat as appropriate  - Assist with meals as needed  - Monitor I&O, weight, and lab values if indicated  - Obtain nutrition services referral as needed  7/28/2022 0724 by Kenneth Juárez RN  Outcome: Progressing  7/28/2022 0724 by Kenneth Juárez RN  Outcome: Progressing     Problem: METABOLIC, FLUID AND ELECTROLYTES - ADULT  Goal: Electrolytes maintained within normal limits  Description: INTERVENTIONS:  - Monitor labs and assess patient for signs and symptoms of electrolyte imbalances  - Administer electrolyte replacement as ordered  - Monitor response to electrolyte replacements, including repeat lab results as appropriate  - Instruct patient on fluid and nutrition as appropriate  7/28/2022 0724 by Abdiaziz Walden RN  Outcome: Progressing  7/28/2022 0724 by Abdiaziz Walden RN  Outcome: Progressing  Goal: Glucose maintained within target range  Description: INTERVENTIONS:  - Monitor Blood Glucose as ordered  - Assess for signs and symptoms of hyperglycemia and hypoglycemia  - Administer ordered medications to maintain glucose within target range  - Assess nutritional intake and initiate nutrition service referral as needed  7/28/2022 0724 by Abdiaziz Walden RN  Outcome: Progressing  7/28/2022 0724 by Abdiaziz Walden RN  Outcome: Progressing

## 2022-07-29 PROCEDURE — 99232 SBSQ HOSP IP/OBS MODERATE 35: CPT | Performed by: STUDENT IN AN ORGANIZED HEALTH CARE EDUCATION/TRAINING PROGRAM

## 2022-07-29 RX ADMIN — AMLODIPINE BESYLATE 10 MG: 10 TABLET ORAL at 08:01

## 2022-07-29 RX ADMIN — ASPIRIN 81 MG: 81 TABLET, COATED ORAL at 08:01

## 2022-07-29 RX ADMIN — HEPARIN SODIUM 5000 UNITS: 5000 INJECTION INTRAVENOUS; SUBCUTANEOUS at 13:12

## 2022-07-29 RX ADMIN — POTASSIUM CHLORIDE 20 MEQ: 1500 TABLET, EXTENDED RELEASE ORAL at 08:01

## 2022-07-29 RX ADMIN — HEPARIN SODIUM 5000 UNITS: 5000 INJECTION INTRAVENOUS; SUBCUTANEOUS at 06:06

## 2022-07-29 RX ADMIN — BUTALBITAL, ACETAMINOPHEN AND CAFFEINE 1 TABLET: 50; 325; 40 TABLET ORAL at 21:11

## 2022-07-29 RX ADMIN — MIRTAZAPINE 15 MG: 15 TABLET, FILM COATED ORAL at 21:11

## 2022-07-29 RX ADMIN — BUTALBITAL, ACETAMINOPHEN AND CAFFEINE 1 TABLET: 50; 325; 40 TABLET ORAL at 12:39

## 2022-07-29 RX ADMIN — HEPARIN SODIUM 5000 UNITS: 5000 INJECTION INTRAVENOUS; SUBCUTANEOUS at 21:11

## 2022-07-29 NOTE — PLAN OF CARE
Problem: Potential for Falls  Goal: Patient will remain free of falls  Description: INTERVENTIONS:  - Educate patient/family on patient safety including physical limitations  - Instruct patient to call for assistance with activity   - Consult OT/PT to assist with strengthening/mobility   - Keep Call bell within reach  - Keep bed low and locked with side rails adjusted as appropriate  - Keep care items and personal belongings within reach  - Initiate and maintain comfort rounds  - Make Fall Risk Sign visible to staff  - Offer Toileting every 2 Hours, in advance of need  - Initiate/Maintain bed/chair alarm  - Obtain necessary fall risk management equipment:bed/chair alarm, call bell, walker, gripper socks, rounds  - Apply yellow socks and bracelet for high fall risk patients  - Consider moving patient to room near nurses station  Outcome: Progressing     Problem: Prexisting or High Potential for Compromised Skin Integrity  Goal: Skin integrity is maintained or improved  Description: INTERVENTIONS:  - Identify patients at risk for skin breakdown  - Assess and monitor skin integrity  - Assess and monitor nutrition and hydration status  - Monitor labs   - Assess for incontinence   - Instruct patient to turn and repostion every 2 hours while in bed and weight shift every 15 minutes while in chair  - Assist with mobility/ambulation  - Relieve pressure over bony prominences  - Avoid friction and shearing  - Provide appropriate hygiene as needed including keeping skin clean and dry  - Evaluate need for skin moisturizer/barrier cream  - Collaborate with interdisciplinary team   - Patient/family teaching  - Consider wound care consult   Outcome: Progressing     Problem: MOBILITY - ADULT  Goal: Maintain or return to baseline ADL function  Description: INTERVENTIONS:  -  Assess patient's ability to carry out ADLs; assess patient's baseline for ADL function and identify physical deficits which impact ability to perform ADLs (bathing, care of mouth/teeth, toileting, grooming, dressing, etc )  - Assess/evaluate cause of self-care deficits   - Assess range of motion  - Assess patient's mobility; develop plan if impaired  - Assess patient's need for assistive devices and provide as appropriate  - Encourage maximum independence but intervene and supervise when necessary  - Involve family in performance of ADLs  - Assess for home care needs following discharge   - Consider OT consult to assist with ADL evaluation and planning for discharge  - Provide patient education as appropriate  Outcome: Progressing  Goal: Maintains/Returns to pre admission functional level  Description: INTERVENTIONS:  - Perform BMAT or MOVE assessment daily    - Set and communicate daily mobility goal to care team and patient/family/caregiver     - Collaborate with rehabilitation services on mobility goals if consulted  - Out of bed for toileting  - Record patient progress and toleration of activity level   Outcome: Progressing     Problem: INFECTION - ADULT  Goal: Absence or prevention of progression during hospitalization  Description: INTERVENTIONS:  - Assess and monitor for signs and symptoms of infection  - Monitor lab/diagnostic results  - Monitor all insertion sites, i e  indwelling lines, tubes, and drains  - Monitor endotracheal if appropriate and nasal secretions for changes in amount and color  - [place] appropriate cooling/warming therapies per order  - Administer medications as ordered  - Instruct and encourage patient and family to use good hand hygiene technique  - Identify and instruct in appropriate isolation precautions for identified infection/condition  Outcome: Progressing     Problem: DISCHARGE PLANNING  Goal: Discharge to home or other facility with appropriate resources  Description: INTERVENTIONS:  - Identify barriers to discharge w/patient and caregiver  - Arrange for needed discharge resources and transportation as appropriate  - Identify discharge learning needs (meds, wound care, etc )  - Arrange for interpretive services to assist at discharge as needed  - Refer to Case Management Department for coordinating discharge planning if the patient needs post-hospital services based on physician/advanced practitioner order or complex needs related to functional status, cognitive ability, or social support system  Outcome: Progressing     Problem: SKIN/TISSUE INTEGRITY - ADULT  Goal: Incision(s), wounds(s) or drain site(s) healing without S/S of infection  Description: INTERVENTIONS  - Assess and document dressing, incision, wound bed, drain sites and surrounding tissue  - Provide patient and family education  Outcome: Progressing  Goal: Skin Integrity remains intact(Skin Breakdown Prevention)  Description: Assess:  -Inspect skin when repositioning, toileting, and assisting with ADLS  -Assess extremities for adequate circulation and sensation     Bed Management:  -Have minimal linens on bed & keep smooth, unwrinkled  -Change linens as needed when moist or perspiring    Toileting:  -Offer bedside commode    Activity:  -Encourage activity and walks on unit  -Encourage or provide ROM exercises   -Use appropriate equipment to lift or move patient in bed    Skin Care:  -Avoid use of baby powder, tape, friction and shearing, hot water or constrictive clothing  -Do not massage red bony areas       Problem: PAIN - ADULT  Goal: Verbalizes/displays adequate comfort level or baseline comfort level  Description: Interventions:  - Encourage patient to monitor pain and request assistance  - Assess pain using appropriate pain scale  - Administer analgesics based on type and severity of pain and evaluate response  - Implement non-pharmacological measures as appropriate and evaluate response  - Consider cultural and social influences on pain and pain management  - Notify physician/advanced practitioner if interventions unsuccessful or patient reports new pain  Outcome: Progressing     Problem: CONFUSION/THOUGHT DISTURBANCE  Goal: Thought disturbances (confusion, delirium, depression, dementia or psychosis) are managed to maintain or return to baseline mental status and functional level  Description: INTERVENTIONS:  - Assess for possible contributors to  thought disturbance, including but not limited to medications, infection, impaired vision or hearing, underlying metabolic abnormalities, dehydration, respiratory compromise,  psychiatric diagnoses and notify attending PHYSICAN/AP  - Monitor and intervene to maintain adequate nutrition, hydration, elimination, sleep and activity  - Decrease environmental stimuli, including noise as appropriate  - Provide frequent contacts to provide refocusing, direction and reassurance as needed  Approach patient calmly with eye contact and at their level    - [place] high risk fall precautions, aspiration precautions and other safety measures, as indicated  - If delirium suspected, notify physician/AP of change in condition and request immediate in-person evaluation  - Pursue consults as appropriate including Geriatric (campus dependent), OT for cognitive evaluation/activity planning, psychiatric, pastoral care, etc   Outcome: Progressing     Problem: GASTROINTESTINAL - ADULT  Goal: Maintains adequate nutritional intake  Description: INTERVENTIONS:  - Monitor percentage of each meal consumed  - Identify factors contributing to decreased intake, treat as appropriate  - Assist with meals as needed  - Monitor I&O, weight, and lab values if indicated  - Obtain nutrition services referral as needed  Outcome: Progressing     Problem: METABOLIC, FLUID AND ELECTROLYTES - ADULT  Goal: Electrolytes maintained within normal limits  Description: INTERVENTIONS:  - Monitor labs and assess patient for signs and symptoms of electrolyte imbalances  - Administer electrolyte replacement as ordered  - Monitor response to electrolyte replacements, including repeat lab results as appropriate  - Instruct patient on fluid and nutrition as appropriate  Outcome: Progressing  Goal: Glucose maintained within target range  Description: INTERVENTIONS:  - Monitor Blood Glucose as ordered  - Assess for signs and symptoms of hyperglycemia and hypoglycemia  - Administer ordered medications to maintain glucose within target range  - Assess nutritional intake and initiate nutrition service referral as needed  Outcome: Progressing     Problem: Nutrition/Hydration-ADULT  Goal: Nutrient/Hydration intake appropriate for improving, restoring or maintaining nutritional needs  Description: Monitor and assess patient's nutrition/hydration status for malnutrition  Collaborate with interdisciplinary team and initiate plan and interventions as ordered  Monitor patient's weight and dietary intake as ordered or per policy  Utilize nutrition screening tool and intervene as necessary  Determine patient's food preferences and provide high-protein, high-caloric foods as appropriate       INTERVENTIONS:  - Monitor oral intake, urinary output, labs, and treatment plans  - Assess nutrition and hydration status and recommend course of action  - Evaluate amount of meals eaten  - Assist patient with eating if necessary   - Allow adequate time for meals  - Recommend/ encourage appropriate diets, oral nutritional supplements, and vitamin/mineral supplements  - Order, calculate, and assess calorie counts as needed  - Recommend, monitor, and adjust tube feedings and TPN/PPN based on assessed needs  - Assess need for intravenous fluids  - Provide specific nutrition/hydration education as appropriate  - Include patient/family/caregiver in decisions related to nutrition  Outcome: Progressing

## 2022-07-29 NOTE — ASSESSMENT & PLAN NOTE
New onset diabetes mellitus-unclear with the this was undetected and detected as inpt here or this was precipitated by COVID infection  · Hemoglobin A1c checked and is 8 8

## 2022-07-29 NOTE — PLAN OF CARE
Problem: Potential for Falls  Goal: Patient will remain free of falls  Description: INTERVENTIONS:  - Educate patient/family on patient safety including physical limitations  - Instruct patient to call for assistance with activity   - Consult OT/PT to assist with strengthening/mobility   - Keep Call bell within reach  - Keep bed low and locked with side rails adjusted as appropriate  - Keep care items and personal belongings within reach  - Initiate and maintain comfort rounds  - Make Fall Risk Sign visible to staff  - Offer Toileting every 2 Hours, in advance of need  - Initiate/Maintain bed/chair alarm  - Obtain necessary fall risk management equipment:bed/chair alarm, call bell, walker, gripper socks, rounds  - Apply yellow socks and bracelet for high fall risk patients  - Consider moving patient to room near nurses station  Outcome: Progressing     Problem: Prexisting or High Potential for Compromised Skin Integrity  Goal: Skin integrity is maintained or improved  Description: INTERVENTIONS:  - Identify patients at risk for skin breakdown  - Assess and monitor skin integrity  - Assess and monitor nutrition and hydration status  - Monitor labs   - Assess for incontinence   - Instruct patient to turn and repostion every 2 hours while in bed and weight shift every 15 minutes while in chair  - Assist with mobility/ambulation  - Relieve pressure over bony prominences  - Avoid friction and shearing  - Provide appropriate hygiene as needed including keeping skin clean and dry  - Evaluate need for skin moisturizer/barrier cream  - Collaborate with interdisciplinary team   - Patient/family teaching  - Consider wound care consult   Outcome: Progressing     Problem: MOBILITY - ADULT  Goal: Maintain or return to baseline ADL function  Description: INTERVENTIONS:  -  Assess patient's ability to carry out ADLs; assess patient's baseline for ADL function and identify physical deficits which impact ability to perform ADLs (bathing, care of mouth/teeth, toileting, grooming, dressing, etc )  - Assess/evaluate cause of self-care deficits   - Assess range of motion  - Assess patient's mobility; develop plan if impaired  - Assess patient's need for assistive devices and provide as appropriate  - Encourage maximum independence but intervene and supervise when necessary  - Involve family in performance of ADLs  - Assess for home care needs following discharge   - Consider OT consult to assist with ADL evaluation and planning for discharge  - Provide patient education as appropriate  Outcome: Progressing  Goal: Maintains/Returns to pre admission functional level  Description: INTERVENTIONS:  - Perform BMAT or MOVE assessment daily    - Set and communicate daily mobility goal to care team and patient/family/caregiver  - Collaborate with rehabilitation services on mobility goals if consulted  - Perform Range of Motion  times a day  - Reposition patient every  hours    - Dangle patient  times a day  - Stand patient  times a day  - Ambulate patient  times a day  - Out of bed to chair  times a day   - Out of bed for meals  times a day  - Out of bed for toileting  - Record patient progress and toleration of activity level   Outcome: Progressing     Problem: INFECTION - ADULT  Goal: Absence or prevention of progression during hospitalization  Description: INTERVENTIONS:  - Assess and monitor for signs and symptoms of infection  - Monitor lab/diagnostic results  - Monitor all insertion sites, i e  indwelling lines, tubes, and drains  - Monitor endotracheal if appropriate and nasal secretions for changes in amount and color  - [place] appropriate cooling/warming therapies per order  - Administer medications as ordered  - Instruct and encourage patient and family to use good hand hygiene technique  - Identify and instruct in appropriate isolation precautions for identified infection/condition  Outcome: Progressing     Problem: DISCHARGE PLANNING  Goal: Discharge to home or other facility with appropriate resources  Description: INTERVENTIONS:  - Identify barriers to discharge w/patient and caregiver  - Arrange for needed discharge resources and transportation as appropriate  - Identify discharge learning needs (meds, wound care, etc )  - Arrange for interpretive services to assist at discharge as needed  - Refer to Case Management Department for coordinating discharge planning if the patient needs post-hospital services based on physician/advanced practitioner order or complex needs related to functional status, cognitive ability, or social support system  Outcome: Progressing     Problem: SKIN/TISSUE INTEGRITY - ADULT  Goal: Incision(s), wounds(s) or drain site(s) healing without S/S of infection  Description: INTERVENTIONS  - Assess and document dressing, incision, wound bed, drain sites and surrounding tissue  - Provide patient and family education  - Perform skin care/dressing changes every   Outcome: Progressing  Goal: Skin Integrity remains intact(Skin Breakdown Prevention)  Description: Assess:  -Perform Joseph assessment every   -Clean and moisturize skin every   -Inspect skin when repositioning, toileting, and assisting with ADLS  -Assess under medical devices such as  every   -Assess extremities for adequate circulation and sensation     Bed Management:  -Have minimal linens on bed & keep smooth, unwrinkled  -Change linens as needed when moist or perspiring  -Avoid sitting or lying in one position for more than  hours while in bed  -Keep HOB at degrees     Toileting:  -Offer bedside commode  -Assess for incontinence every   -Use incontinent care products after each incontinent episode such as     Activity:  -Mobilize patient  times a day  -Encourage activity and walks on unit  -Encourage or provide ROM exercises   -Turn and reposition patient every  Hours  -Use appropriate equipment to lift or move patient in bed  -Instruct/ Assist with weight shifting every  when out of bed in chair  -Consider limitation of chair time  hour intervals    Skin Care:  -Avoid use of baby powder, tape, friction and shearing, hot water or constrictive clothing  -Relieve pressure over bony prominences using   -Do not massage red bony areas    Next Steps:  -Teach patient strategies to minimize risks such as    -Consider consults to  interdisciplinary teams such   Outcome: Progressing     Problem: PAIN - ADULT  Goal: Verbalizes/displays adequate comfort level or baseline comfort level  Description: Interventions:  - Encourage patient to monitor pain and request assistance  - Assess pain using appropriate pain scale  - Administer analgesics based on type and severity of pain and evaluate response  - Implement non-pharmacological measures as appropriate and evaluate response  - Consider cultural and social influences on pain and pain management  - Notify physician/advanced practitioner if interventions unsuccessful or patient reports new pain  Outcome: Progressing     Problem: CONFUSION/THOUGHT DISTURBANCE  Goal: Thought disturbances (confusion, delirium, depression, dementia or psychosis) are managed to maintain or return to baseline mental status and functional level  Description: INTERVENTIONS:  - Assess for possible contributors to  thought disturbance, including but not limited to medications, infection, impaired vision or hearing, underlying metabolic abnormalities, dehydration, respiratory compromise,  psychiatric diagnoses and notify attending PHYSICAN/AP  - Monitor and intervene to maintain adequate nutrition, hydration, elimination, sleep and activity  - Decrease environmental stimuli, including noise as appropriate  - Provide frequent contacts to provide refocusing, direction and reassurance as needed  Approach patient calmly with eye contact and at their level    - [place] high risk fall precautions, aspiration precautions and other safety measures, as indicated  - If delirium suspected, notify physician/AP of change in condition and request immediate in-person evaluation  - Pursue consults as appropriate including Geriatric (campus dependent), OT for cognitive evaluation/activity planning, psychiatric, pastoral care, etc   Outcome: Progressing     Problem: GASTROINTESTINAL - ADULT  Goal: Maintains adequate nutritional intake  Description: INTERVENTIONS:  - Monitor percentage of each meal consumed  - Identify factors contributing to decreased intake, treat as appropriate  - Assist with meals as needed  - Monitor I&O, weight, and lab values if indicated  - Obtain nutrition services referral as needed  Outcome: Progressing     Problem: METABOLIC, FLUID AND ELECTROLYTES - ADULT  Goal: Electrolytes maintained within normal limits  Description: INTERVENTIONS:  - Monitor labs and assess patient for signs and symptoms of electrolyte imbalances  - Administer electrolyte replacement as ordered  - Monitor response to electrolyte replacements, including repeat lab results as appropriate  - Instruct patient on fluid and nutrition as appropriate  Outcome: Progressing  Goal: Glucose maintained within target range  Description: INTERVENTIONS:  - Monitor Blood Glucose as ordered  - Assess for signs and symptoms of hyperglycemia and hypoglycemia  - Administer ordered medications to maintain glucose within target range  - Assess nutritional intake and initiate nutrition service referral as needed  Outcome: Progressing     Problem: Nutrition/Hydration-ADULT  Goal: Nutrient/Hydration intake appropriate for improving, restoring or maintaining nutritional needs  Description: Monitor and assess patient's nutrition/hydration status for malnutrition  Collaborate with interdisciplinary team and initiate plan and interventions as ordered  Monitor patient's weight and dietary intake as ordered or per policy  Utilize nutrition screening tool and intervene as necessary   Determine patient's food preferences and provide high-protein, high-caloric foods as appropriate       INTERVENTIONS:  - Monitor oral intake, urinary output, labs, and treatment plans  - Assess nutrition and hydration status and recommend course of action  - Evaluate amount of meals eaten  - Assist patient with eating if necessary   - Allow adequate time for meals  - Recommend/ encourage appropriate diets, oral nutritional supplements, and vitamin/mineral supplements  - Order, calculate, and assess calorie counts as needed  - Recommend, monitor, and adjust tube feedings and TPN/PPN based on assessed needs  - Assess need for intravenous fluids  - Provide specific nutrition/hydration education as appropriate  - Include patient/family/caregiver in decisions related to nutrition  Outcome: Progressing

## 2022-07-29 NOTE — ASSESSMENT & PLAN NOTE
Pathology showing Melanoma  Discussed case with Oncology Alyssa Sweeney MD   · Recommendations were: Pathology noted  F/u to be placed with Dr Regan Callahan for melanoma medical oncology care  · CT CAP without any mets    · Plastics consult per surgery recommendations for complete excision to be done outpatient

## 2022-07-29 NOTE — PROGRESS NOTES
[facility]  Progress Note - Kaity Espinosa 1935, 82 y o  male MRN: [id number]  Unit/Bed#: [place] [address] -01 Encounter: [id number]  Primary Care Provider: No primary care provider on file  Date and time admitted to hospital: 7/9/2022 11:02 AM    * Cellulitis  Assessment & Plan  Presented with left leg cellulitis, surrounding wounds, and growth on leg  · Maggots were found in the wound upon presentation to the ED  · Ct lower extremity: "Extensive subcutaneous edema likely representing cellulitis  Although evaluation for abscess is limited in the absence images contrast no obvious discrete collection is identified  Dermal soft tissue lesion seen in association with the posterior medial calf "  · Completed 7 days of IV antibiotics  · Blood cultures negative thus far    Depression  Assessment & Plan  Remeron started per psych recommendations    Malignant melanoma of leg, left [facility])  Assessment & Plan  Pathology showing Melanoma  Discussed case with Oncology Tammy Rodriguez MD   · Recommendations were: Pathology noted  F/u to be placed with Dr Esthela Marquez for melanoma medical oncology care  · CT CAP without any mets  · Plastics consult per surgery recommendations for complete excision to be done outpatient    COVID-19  Assessment & Plan  Detected to be COVID-19 positive on 07/20/2022  Asymptomatic    Will remain in the hospital in quarantine until July 31st    Encounter for competency evaluation  Assessment & Plan  · Neuropsychiatry consultation appreciated, at this point patient does not have capacity to make informed medical decisions    Type 2 diabetes mellitus with hyperglycemia, without long-term current use of insulin ([address] )  Assessment & Plan  New onset diabetes mellitus-unclear with the this was undetected and detected as inpt here or this was precipitated by COVID infection  · Hemoglobin A1c checked and is 8 8    AKUA (acute kidney injury) ([address] )  Assessment & Plan  Resolved  VTE Pharmacologic Prophylaxis:   Pharmacologic: Heparin  Mechanical VTE Prophylaxis in Place: Yes    Discussions with Specialists or Other Care Team Provider: nursing    Education and Discussions with Family / Patient: patient    Time Spent for Care: 30 minutes  More than 50% of total time spent on counseling and coordination of care as described above  Current Length of Stay: 20 day(s)    Current Patient Status: Inpatient   Certification Statement: The patient will continue to require additional inpatient hospital stay due to placement    Discharge Plan: placement    Code Status: Level 1 - Full Code      Subjective:   Patient seen and examined at bedside  He is complaining of tension type headache  No new complaints otherwise  Objective:     Vitals:   Temp (24hrs), Av 6 °F (37 °C), Min:98 3 °F (36 8 °C), Max:99 °F (37 2 °C)    Temp:  [98 3 °F (36 8 °C)-99 °F (37 2 °C)] 99 °F (37 2 °C)  HR:  [] 100  Resp:  [18-20] 18  BP: (158-162)/(81-85) 158/81  SpO2:  [92 %-94 %] 93 %  Body mass index is 29 79 kg/m²  Input and Output Summary (last 24 hours): Intake/Output Summary (Last 24 hours) at 2022 1218  Last data filed at 2022 1128  Gross per 24 hour   Intake --   Output 450 ml   Net -450 ml       Physical Exam:     Physical Exam  Vitals reviewed  HENT:      Head: Normocephalic  Nose: Nose normal       Mouth/Throat:      Mouth: Mucous membranes are moist    Eyes:      General: No scleral icterus  Cardiovascular:      Rate and Rhythm: Normal rate  Pulmonary:      Effort: Pulmonary effort is normal  No respiratory distress  Abdominal:      Palpations: Abdomen is soft  Tenderness: There is no abdominal tenderness  Skin:     General: Skin is warm  Neurological:      Mental Status: He is alert  Mental status is at baseline     Psychiatric:         Mood and Affect: Mood normal          Behavior: Behavior normal        Additional Data: Labs:    Results from last 7 days   Lab Units 07/23/22  0526   WBC Thousand/uL 7 01   HEMOGLOBIN g/dL 15 6   HEMATOCRIT % 47 6   PLATELETS Thousands/uL 212   NEUTROS PCT % 57   LYMPHS PCT % 31   MONOS PCT % 10   EOS PCT % 2     Results from last 7 days   Lab Units 07/27/22  0435   SODIUM mmol/L 138   POTASSIUM mmol/L 3 5   CHLORIDE mmol/L 101   CO2 mmol/L 25   BUN mg/dL 13   CREATININE mg/dL 0 80   ANION GAP mmol/L 12   CALCIUM mg/dL 8 6   GLUCOSE RANDOM mg/dL 103         Results from last 7 days   Lab Units 07/26/22  1112 07/26/22  0734 07/25/22  2101 07/25/22  1515 07/25/22  1121   POC GLUCOSE mg/dl 129 105 104 143* 124                   * I Have Reviewed All Lab Data Listed Above  * Additional Pertinent Lab Tests Reviewed: [address] [facility] Admission Reviewed    Imaging:    Imaging Reports Reviewed Today Include: no new imaging    Recent Cultures (last 7 days):           Last 24 Hours Medication List:   Current Facility-Administered Medications   Medication Dose Route Frequency Provider Last Rate    acetaminophen  650 mg Oral Q6H PRN Vanessa Kim DO      al mag oxide-diphenhydramine-lidocaine viscous  10 mL Swish & Swallow Q6H PRN Kamar Benitez MD      amLODIPine  10 mg Oral Daily Adilia Schaffer MD      aspirin  81 mg Oral Daily Adilia Schaffer MD      butalbital-acetaminophen-caffeine  1 tablet Oral Q4H PRN Kamar Benitez MD      heparin (porcine)  5,000 Units Subcutaneous Q8H [facility] Vanessa Kim DO      mirtazapine  15 mg Oral HS Marianela Ramos MD      oxyCODONE  2 5 mg Oral Q8H PRN Acosta Escudero DO      potassium chloride  20 mEq Oral Daily Mario Jolley MD          Today, Patient Was Seen By: Jose Orellana MD    ** Please Note: Dictation voice to text software may have been used in the creation of this document   **

## 2022-07-29 NOTE — CASE MANAGEMENT
Case Management Discharge Planning Note    Patient name Marvin Monroy  Location [facility] 2 /[facility] 2 Nitesh Winston* MRN [id number]  : 1935 Date 2022       Current Admission Date: 2022  Current Admission Diagnosis:Cellulitis   Patient Active Problem List    Diagnosis Date Noted    Depression 2022    COVID-19 2022    [facility] ) 2022    Encounter for competency evaluation 2022    Hypertension 2022    AKUA (acute kidney injury) ([facility] ) 2022    Cellulitis 2022    Leg mass 2022    Type 2 diabetes mellitus with hyperglycemia, without long-term current use of insulin ([place] [address] ) 2022      LOS (days): 20  Geometric Mean LOS (GMLOS) (days): 4 60  Days to GMLOS:-15 4     OBJECTIVE:  Risk of Unplanned Readmission Score: 11 97         Current admission status: Inpatient   Preferred Pharmacy:   [address] [place] [address] #[id number] [facility] Rigo Yoder  [facility] [address]  Phone: [phone] Fax: [phone]    Primary Care Provider: No primary care provider on file  Primary Insurance: MEDICARE  Secondary Insurance: [address] [place] [address]    DISCHARGE DETAILS:                Additional Comments: CM spoke [place] [id number] -admissions liaison for [facility] to confirm pt able to admit  (10 day quarantine period) under LTC  Per Jacqueline , admissions are currently closed due to internal incident- will need to check Monday if able to accept at that point  CM to follow up re: finalized dc disposition

## 2022-07-29 NOTE — PHYSICIAN ADVISOR
Current patient class: Inpatient  The patient is currently on Hospital Day: 21      The patient was admitted to the hospital at  4:33 PM on 7/9/22 for the following diagnosis:  Cellulitis [L03 90]  Blood glucose elevated [R73 9]  Visit for wound check [Z51 89]  Wound of left lower extremity, initial encounter [S84 032K]     CMS OUTLIER STAY REVIEW    After review of the relevant documentation, labs, vital signs and test results, the patient is appropriate for CONTINUED INPATIENT ADMISSION  The patient continues to remain hospitalized receiving acute medical care  The patient has surpassed the expected duration of stay, however given the clinical condition, need for further acute care management, the patient is appropriate to remain in an inpatient status  The patient still being actively managed, and does have unresolved medical issues requiring further hospitalization  This review is conducted at 10 day intervals, to help satisfy the requirements for significant outlier stay review as per CMS  Given the current condition of this patient, the patient satisfies this review was determination for continued inpatient stay  Rationale is as follows: The patient is a 82 yrs old Male who presented to the ED at 7/9/2022 11:02 AM with a chief complaint of Wound Check (Pt c/o left leg swelling and pain with redness  There was a growth that he was supposed to go see a dermatologist for but he refused so they have been doing wound care a home  But this morning he woke up with drainage on the bandage  Area is warm to touch  and foul odor  )  Patient admitted for cellulitis and maggots on wound  He completed 7 day course of antibiotics  He was evaluated by neuropsychology and found to lack capacity for medical decision making  He is also covid positive requiring quarantine until 7/31/22  He is being followed by psychiatry  Placement is pending  He remains inpatient appropriate        The patients vitals on arrival were   ED Triage Vitals   Temperature Pulse Respirations Blood Pressure SpO2   07/09/22 1100 07/09/22 1100 07/09/22 1100 07/09/22 1100 07/09/22 1100   97 9 °F (36 6 °C) 87 18 168/77 97 %      Temp Source Heart Rate Source Patient Position - Orthostatic VS BP Location FiO2 (%)   07/09/22 1100 07/09/22 1319 07/09/22 1100 07/09/22 1100 --   Oral Monitor Sitting Right arm       Pain Score       07/09/22 1100       6           Past Medical History:   Diagnosis Date    Hx of heart artery stent      Past Surgical History:   Procedure Laterality Date    BACK SURGERY             Consults have been placed to:   IP CONSULT TO ACUTE CARE SURGERY  IP CONSULT TO PHARMACY  IP CONSULT TO PLASTIC SURGERY  IP CONSULT TO PSYCHIATRY    Vitals:    07/28/22 1446 07/28/22 2117 07/29/22 0625 07/29/22 0744   BP: 161/85 162/84  158/81   BP Location:       Pulse: 94 100 101 100   Resp: 20 20  18   Temp: 98 3 °F (36 8 °C) 98 4 °F (36 9 °C)  99 °F (37 2 °C)   TempSrc:       SpO2: 92% 94% 92% 93%   Weight:       Height:           Most recent labs:    Recent Labs     07/27/22  0435   K 3 5   CALCIUM 8 6   BUN 13   CREATININE 0 80       Scheduled Meds:  Current Facility-Administered Medications   Medication Dose Route Frequency Provider Last Rate    acetaminophen  650 mg Oral Q6H PRN Vanessa Kim DO      al mag oxide-diphenhydramine-lidocaine viscous  10 mL Swish & Swallow Q6H PRN Chau Traylor MD      amLODIPine  10 mg Oral Daily Adriana Bean MD      aspirin  81 mg Oral Daily Adriana Bean MD      butalbital-acetaminophen-caffeine  1 tablet Oral Q4H PRN Chau Traylor MD      heparin (porcine)  5,000 Units Subcutaneous Q8H [address] Vanessa Kim DO      mirtazapine  15 mg Oral HS Jayce Gilbert MD      oxyCODONE  2 5 mg Oral Q8H PRN Vanessa Kim DO      potassium chloride  20 mEq Oral Daily Valdo Mendes MD       Continuous Infusions:   PRN Meds:   acetaminophen    al mag oxide-diphenhydramine-lidocaine viscous   butalbital-acetaminophen-caffeine    oxyCODONE    Surgical procedures (if appropriate):

## 2022-07-30 PROCEDURE — 99232 SBSQ HOSP IP/OBS MODERATE 35: CPT | Performed by: STUDENT IN AN ORGANIZED HEALTH CARE EDUCATION/TRAINING PROGRAM

## 2022-07-30 RX ADMIN — POTASSIUM CHLORIDE 20 MEQ: 1500 TABLET, EXTENDED RELEASE ORAL at 08:06

## 2022-07-30 RX ADMIN — HEPARIN SODIUM 5000 UNITS: 5000 INJECTION INTRAVENOUS; SUBCUTANEOUS at 13:08

## 2022-07-30 RX ADMIN — AMLODIPINE BESYLATE 10 MG: 10 TABLET ORAL at 08:06

## 2022-07-30 RX ADMIN — MIRTAZAPINE 15 MG: 15 TABLET, FILM COATED ORAL at 21:13

## 2022-07-30 RX ADMIN — HEPARIN SODIUM 5000 UNITS: 5000 INJECTION INTRAVENOUS; SUBCUTANEOUS at 21:13

## 2022-07-30 RX ADMIN — HEPARIN SODIUM 5000 UNITS: 5000 INJECTION INTRAVENOUS; SUBCUTANEOUS at 05:00

## 2022-07-30 RX ADMIN — ASPIRIN 81 MG: 81 TABLET, COATED ORAL at 08:06

## 2022-07-30 NOTE — PLAN OF CARE
Problem: Potential for Falls  Goal: Patient will remain free of falls  Description: INTERVENTIONS:  - Educate patient/family on patient safety including physical limitations  - Instruct patient to call for assistance with activity   - Consult OT/PT to assist with strengthening/mobility   - Keep Call bell within reach  - Keep bed low and locked with side rails adjusted as appropriate  - Keep care items and personal belongings within reach  - Initiate and maintain comfort rounds  - Make Fall Risk Sign visible to staff  - Offer Toileting every 2 Hours, in advance of need  - Initiate/Maintain bed/chair alarm  - Obtain necessary fall risk management equipment:bed/chair alarm, call bell, walker, gripper socks, rounds  - Apply yellow socks and bracelet for high fall risk patients  - Consider moving patient to room near nurses station  Outcome: Progressing     Problem: Prexisting or High Potential for Compromised Skin Integrity  Goal: Skin integrity is maintained or improved  Description: INTERVENTIONS:  - Identify patients at risk for skin breakdown  - Assess and monitor skin integrity  - Assess and monitor nutrition and hydration status  - Monitor labs   - Assess for incontinence   - Instruct patient to turn and repostion every 2 hours while in bed and weight shift every 15 minutes while in chair  - Assist with mobility/ambulation  - Relieve pressure over bony prominences  - Avoid friction and shearing  - Provide appropriate hygiene as needed including keeping skin clean and dry  - Evaluate need for skin moisturizer/barrier cream  - Collaborate with interdisciplinary team   - Patient/family teaching  - Consider wound care consult   Outcome: Progressing     Problem: MOBILITY - ADULT  Goal: Maintain or return to baseline ADL function  Description: INTERVENTIONS:  -  Assess patient's ability to carry out ADLs; assess patient's baseline for ADL function and identify physical deficits which impact ability to perform ADLs (bathing, care of mouth/teeth, toileting, grooming, dressing, etc )  - Assess/evaluate cause of self-care deficits   - Assess range of motion  - Assess patient's mobility; develop plan if impaired  - Assess patient's need for assistive devices and provide as appropriate  - Encourage maximum independence but intervene and supervise when necessary  - Involve family in performance of ADLs  - Assess for home care needs following discharge   - Consider OT consult to assist with ADL evaluation and planning for discharge  - Provide patient education as appropriate  Outcome: Progressing  Goal: Maintains/Returns to pre admission functional level  Description: INTERVENTIONS:  - Perform BMAT or MOVE assessment daily    - Set and communicate daily mobility goal to care team and patient/family/caregiver  - Collaborate with rehabilitation services on mobility goals if consulted  - Perform Range of Motion  times a day  - Reposition patient every  hours    - Dangle patient times a day  - Stand patient  times a day  - Ambulate patient  times a day  - Out of bed to chair  times a day   - Out of bed for meals  times a day  - Out of bed for toileting  - Record patient progress and toleration of activity level   Outcome: Progressing     Problem: INFECTION - ADULT  Goal: Absence or prevention of progression during hospitalization  Description: INTERVENTIONS:  - Assess and monitor for signs and symptoms of infection  - Monitor lab/diagnostic results  - Monitor all insertion sites, i e  indwelling lines, tubes, and drains  - Monitor endotracheal if appropriate and nasal secretions for changes in amount and color  - [place] appropriate cooling/warming therapies per order  - Administer medications as ordered  - Instruct and encourage patient and family to use good hand hygiene technique  - Identify and instruct in appropriate isolation precautions for identified infection/condition  Outcome: Progressing     Problem: DISCHARGE PLANNING  Goal: Discharge to home or other facility with appropriate resources  Description: INTERVENTIONS:  - Identify barriers to discharge w/patient and caregiver  - Arrange for needed discharge resources and transportation as appropriate  - Identify discharge learning needs (meds, wound care, etc )  - Arrange for interpretive services to assist at discharge as needed  - Refer to Case Management Department for coordinating discharge planning if the patient needs post-hospital services based on physician/advanced practitioner order or complex needs related to functional status, cognitive ability, or social support system  Outcome: Progressing     Problem: SKIN/TISSUE INTEGRITY - ADULT  Goal: Incision(s), wounds(s) or drain site(s) healing without S/S of infection  Description: INTERVENTIONS  - Assess and document dressing, incision, wound bed, drain sites and surrounding tissue  - Provide patient and family education  - Perform skin care/dressing changes every   Outcome: Progressing  Goal: Skin Integrity remains intact(Skin Breakdown Prevention)  Description: Assess:  -Perform Joseph assessment every   -Clean and moisturize skin every   -Inspect skin when repositioning, toileting, and assisting with ADLS  -Assess under medical devices such as  every   -Assess extremities for adequate circulation and sensation     Bed Management:  -Have minimal linens on bed & keep smooth, unwrinkled  -Change linens as needed when moist or perspiring  -Avoid sitting or lying in one position for more than  hours while in bed  -Keep HOB at degrees     Toileting:  -Offer bedside commode  -Assess for incontinence every   -Use incontinent care products after each incontinent episode such as     Activity:  -Mobilize patient  times a day  -Encourage activity and walks on unit  -Encourage or provide ROM exercises   -Turn and reposition patient every  Hours  -Use appropriate equipment to lift or move patient in bed  -Instruct/ Assist with weight shifting every  when out of bed in chair  -Consider limitation of chair time  hour intervals    Skin Care:  -Avoid use of baby powder, tape, friction and shearing, hot water or constrictive clothing  -Relieve pressure over bony prominences using   -Do not massage red bony areas    Next Steps:  -Teach patient strategies to minimize risks such as    -Consider consults to  interdisciplinary teams such   Outcome: Progressing     Problem: PAIN - ADULT  Goal: Verbalizes/displays adequate comfort level or baseline comfort level  Description: Interventions:  - Encourage patient to monitor pain and request assistance  - Assess pain using appropriate pain scale  - Administer analgesics based on type and severity of pain and evaluate response  - Implement non-pharmacological measures as appropriate and evaluate response  - Consider cultural and social influences on pain and pain management  - Notify physician/advanced practitioner if interventions unsuccessful or patient reports new pain  Outcome: Progressing     Problem: CONFUSION/THOUGHT DISTURBANCE  Goal: Thought disturbances (confusion, delirium, depression, dementia or psychosis) are managed to maintain or return to baseline mental status and functional level  Description: INTERVENTIONS:  - Assess for possible contributors to  thought disturbance, including but not limited to medications, infection, impaired vision or hearing, underlying metabolic abnormalities, dehydration, respiratory compromise,  psychiatric diagnoses and notify attending PHYSICAN/AP  - Monitor and intervene to maintain adequate nutrition, hydration, elimination, sleep and activity  - Decrease environmental stimuli, including noise as appropriate  - Provide frequent contacts to provide refocusing, direction and reassurance as needed  Approach patient calmly with eye contact and at their level    - [place] high risk fall precautions, aspiration precautions and other safety measures, as indicated  - If delirium suspected, notify physician/AP of change in condition and request immediate in-person evaluation  - Pursue consults as appropriate including Geriatric (campus dependent), OT for cognitive evaluation/activity planning, psychiatric, pastoral care, etc   Outcome: Progressing     Problem: GASTROINTESTINAL - ADULT  Goal: Maintains adequate nutritional intake  Description: INTERVENTIONS:  - Monitor percentage of each meal consumed  - Identify factors contributing to decreased intake, treat as appropriate  - Assist with meals as needed  - Monitor I&O, weight, and lab values if indicated  - Obtain nutrition services referral as needed  Outcome: Progressing     Problem: METABOLIC, FLUID AND ELECTROLYTES - ADULT  Goal: Electrolytes maintained within normal limits  Description: INTERVENTIONS:  - Monitor labs and assess patient for signs and symptoms of electrolyte imbalances  - Administer electrolyte replacement as ordered  - Monitor response to electrolyte replacements, including repeat lab results as appropriate  - Instruct patient on fluid and nutrition as appropriate  Outcome: Progressing  Goal: Glucose maintained within target range  Description: INTERVENTIONS:  - Monitor Blood Glucose as ordered  - Assess for signs and symptoms of hyperglycemia and hypoglycemia  - Administer ordered medications to maintain glucose within target range  - Assess nutritional intake and initiate nutrition service referral as needed  Outcome: Progressing     Problem: Nutrition/Hydration-ADULT  Goal: Nutrient/Hydration intake appropriate for improving, restoring or maintaining nutritional needs  Description: Monitor and assess patient's nutrition/hydration status for malnutrition  Collaborate with interdisciplinary team and initiate plan and interventions as ordered  Monitor patient's weight and dietary intake as ordered or per policy  Utilize nutrition screening tool and intervene as necessary   Determine patient's food preferences and provide high-protein, high-caloric foods as appropriate       INTERVENTIONS:  - Monitor oral intake, urinary output, labs, and treatment plans  - Assess nutrition and hydration status and recommend course of action  - Evaluate amount of meals eaten  - Assist patient with eating if necessary   - Allow adequate time for meals  - Recommend/ encourage appropriate diets, oral nutritional supplements, and vitamin/mineral supplements  - Order, calculate, and assess calorie counts as needed  - Recommend, monitor, and adjust tube feedings and TPN/PPN based on assessed needs  - Assess need for intravenous fluids  - Provide specific nutrition/hydration education as appropriate  - Include patient/family/caregiver in decisions related to nutrition  Outcome: Progressing

## 2022-07-30 NOTE — PLAN OF CARE
Problem: Potential for Falls  Goal: Patient will remain free of falls  Description: INTERVENTIONS:  - Educate patient/family on patient safety including physical limitations  - Instruct patient to call for assistance with activity   - Consult OT/PT to assist with strengthening/mobility   - Keep Call bell within reach  - Keep bed low and locked with side rails adjusted as appropriate  - Keep care items and personal belongings within reach  - Initiate and maintain comfort rounds  - Make Fall Risk Sign visible to staff  - Offer Toileting every 2 Hours, in advance of need  - Initiate/Maintain bed/chair alarm  - Obtain necessary fall risk management equipment:bed/chair alarm, call bell, walker, gripper socks, rounds  - Apply yellow socks and bracelet for high fall risk patients  - Consider moving patient to room near nurses station  Outcome: Progressing     Problem: Prexisting or High Potential for Compromised Skin Integrity  Goal: Skin integrity is maintained or improved  Description: INTERVENTIONS:  - Identify patients at risk for skin breakdown  - Assess and monitor skin integrity  - Assess and monitor nutrition and hydration status  - Monitor labs   - Assess for incontinence   - Instruct patient to turn and repostion every 2 hours while in bed and weight shift every 15 minutes while in chair  - Assist with mobility/ambulation  - Relieve pressure over bony prominences  - Avoid friction and shearing  - Provide appropriate hygiene as needed including keeping skin clean and dry  - Evaluate need for skin moisturizer/barrier cream  - Collaborate with interdisciplinary team   - Patient/family teaching  - Consider wound care consult   Outcome: Progressing     Problem: MOBILITY - ADULT  Goal: Maintain or return to baseline ADL function  Description: INTERVENTIONS:  -  Assess patient's ability to carry out ADLs; assess patient's baseline for ADL function and identify physical deficits which impact ability to perform ADLs (bathing, care of mouth/teeth, toileting, grooming, dressing, etc )  - Assess/evaluate cause of self-care deficits   - Assess range of motion  - Assess patient's mobility; develop plan if impaired  - Assess patient's need for assistive devices and provide as appropriate  - Encourage maximum independence but intervene and supervise when necessary  - Involve family in performance of ADLs  - Assess for home care needs following discharge   - Consider OT consult to assist with ADL evaluation and planning for discharge  - Provide patient education as appropriate  Outcome: Progressing  Goal: Maintains/Returns to pre admission functional level  Description: INTERVENTIONS:  - Perform BMAT or MOVE assessment daily    - Set and communicate daily mobility goal to care team and patient/family/caregiver  - Collaborate with rehabilitation services on mobility goals if consulted  - Perform Range of Motion 2 times a day  - Reposition patient every 2 hours    - Dangle patient 2 times a day  - Stand patient 2 times a day  - Ambulate patient 2 times a day  - Out of bed to chair 2 times a day   - Out of bed for meals 2 times a day  - Out of bed for toileting  - Record patient progress and toleration of activity level   Outcome: Progressing     Problem: INFECTION - ADULT  Goal: Absence or prevention of progression during hospitalization  Description: INTERVENTIONS:  - Assess and monitor for signs and symptoms of infection  - Monitor lab/diagnostic results  - Monitor all insertion sites, i e  indwelling lines, tubes, and drains  - Monitor endotracheal if appropriate and nasal secretions for changes in amount and color  - [place] appropriate cooling/warming therapies per order  - Administer medications as ordered  - Instruct and encourage patient and family to use good hand hygiene technique  - Identify and instruct in appropriate isolation precautions for identified infection/condition  Outcome: Progressing     Problem: DISCHARGE PLANNING  Goal: Discharge to home or other facility with appropriate resources  Description: INTERVENTIONS:  - Identify barriers to discharge w/patient and caregiver  - Arrange for needed discharge resources and transportation as appropriate  - Identify discharge learning needs (meds, wound care, etc )  - Arrange for interpretive services to assist at discharge as needed  - Refer to Case Management Department for coordinating discharge planning if the patient needs post-hospital services based on physician/advanced practitioner order or complex needs related to functional status, cognitive ability, or social support system  Outcome: Progressing     Problem: SKIN/TISSUE INTEGRITY - ADULT  Goal: Incision(s), wounds(s) or drain site(s) healing without S/S of infection  Description: INTERVENTIONS  - Assess and document dressing, incision, wound bed, drain sites and surrounding tissue  - Provide patient and family education  - Perform skin care/dressing changes every 2  Outcome: Progressing  Goal: Skin Integrity remains intact(Skin Breakdown Prevention)  Description: Assess:  -Perform Joseph assessment every 2 hrs  -Clean and moisturize skin every 2 hrs  -Inspect skin when repositioning, toileting, and assisting with ADLS  -Assess under medical devices such as cuffs every 2 hrs  -Assess extremities for adequate circulation and sensation     Bed Management:  -Have minimal linens on bed & keep smooth, unwrinkled  -Change linens as needed when moist or perspiring  -Avoid sitting or lying in one position for more than 2 hours while in bed  -Keep HOB at 30 degrees     Toileting:  -Offer bedside commode  -Assess for incontinence every 2 hrs  -Use incontinent care products after each incontinent episode such as pads    Activity:  -Mobilize patient 2 times a day  -Encourage activity and walks on unit  -Encourage or provide ROM exercises   -Turn and reposition patient every 2 Hours  -Use appropriate equipment to lift or move patient in bed  -Instruct/ Assist with weight shifting every 2 when out of bed in chair  -Consider limitation of chair time 2 hour intervals    Skin Care:  -Avoid use of baby powder, tape, friction and shearing, hot water or constrictive clothing  -Relieve pressure over bony prominences using wedges  -Do not massage red bony areas    Next Steps:  -Teach patient strategies to minimize risks such as use call bell   -Consider consults to  interdisciplinary teams such as   Outcome: Progressing     Problem: PAIN - ADULT  Goal: Verbalizes/displays adequate comfort level or baseline comfort level  Description: Interventions:  - Encourage patient to monitor pain and request assistance  - Assess pain using appropriate pain scale  - Administer analgesics based on type and severity of pain and evaluate response  - Implement non-pharmacological measures as appropriate and evaluate response  - Consider cultural and social influences on pain and pain management  - Notify physician/advanced practitioner if interventions unsuccessful or patient reports new pain  Outcome: Progressing     Problem: CONFUSION/THOUGHT DISTURBANCE  Goal: Thought disturbances (confusion, delirium, depression, dementia or psychosis) are managed to maintain or return to baseline mental status and functional level  Description: INTERVENTIONS:  - Assess for possible contributors to  thought disturbance, including but not limited to medications, infection, impaired vision or hearing, underlying metabolic abnormalities, dehydration, respiratory compromise,  psychiatric diagnoses and notify attending PHYSICAN/AP  - Monitor and intervene to maintain adequate nutrition, hydration, elimination, sleep and activity  - Decrease environmental stimuli, including noise as appropriate  - Provide frequent contacts to provide refocusing, direction and reassurance as needed  Approach patient calmly with eye contact and at their level    - [place] high risk fall precautions, aspiration precautions and other safety measures, as indicated  - If delirium suspected, notify physician/AP of change in condition and request immediate in-person evaluation  - Pursue consults as appropriate including Geriatric (campus dependent), OT for cognitive evaluation/activity planning, psychiatric, pastoral care, etc   Outcome: Progressing     Problem: GASTROINTESTINAL - ADULT  Goal: Maintains adequate nutritional intake  Description: INTERVENTIONS:  - Monitor percentage of each meal consumed  - Identify factors contributing to decreased intake, treat as appropriate  - Assist with meals as needed  - Monitor I&O, weight, and lab values if indicated  - Obtain nutrition services referral as needed  Outcome: Progressing     Problem: METABOLIC, FLUID AND ELECTROLYTES - ADULT  Goal: Electrolytes maintained within normal limits  Description: INTERVENTIONS:  - Monitor labs and assess patient for signs and symptoms of electrolyte imbalances  - Administer electrolyte replacement as ordered  - Monitor response to electrolyte replacements, including repeat lab results as appropriate  - Instruct patient on fluid and nutrition as appropriate  Outcome: Progressing  Goal: Glucose maintained within target range  Description: INTERVENTIONS:  - Monitor Blood Glucose as ordered  - Assess for signs and symptoms of hyperglycemia and hypoglycemia  - Administer ordered medications to maintain glucose within target range  - Assess nutritional intake and initiate nutrition service referral as needed  Outcome: Progressing     Problem: Nutrition/Hydration-ADULT  Goal: Nutrient/Hydration intake appropriate for improving, restoring or maintaining nutritional needs  Description: Monitor and assess patient's nutrition/hydration status for malnutrition  Collaborate with interdisciplinary team and initiate plan and interventions as ordered  Monitor patient's weight and dietary intake as ordered or per policy   Utilize nutrition screening tool and intervene as necessary  Determine patient's food preferences and provide high-protein, high-caloric foods as appropriate       INTERVENTIONS:  - Monitor oral intake, urinary output, labs, and treatment plans  - Assess nutrition and hydration status and recommend course of action  - Evaluate amount of meals eaten  - Assist patient with eating if necessary   - Allow adequate time for meals  - Recommend/ encourage appropriate diets, oral nutritional supplements, and vitamin/mineral supplements  - Order, calculate, and assess calorie counts as needed  - Recommend, monitor, and adjust tube feedings and TPN/PPN based on assessed needs  - Assess need for intravenous fluids  - Provide specific nutrition/hydration education as appropriate  - Include patient/family/caregiver in decisions related to nutrition  Outcome: Progressing

## 2022-07-30 NOTE — PLAN OF CARE
Problem: Potential for Falls  Goal: Patient will remain free of falls  Description: INTERVENTIONS:  - Educate patient/family on patient safety including physical limitations  - Instruct patient to call for assistance with activity   - Consult OT/PT to assist with strengthening/mobility   - Keep Call bell within reach  - Keep bed low and locked with side rails adjusted as appropriate  - Keep care items and personal belongings within reach  - Initiate and maintain comfort rounds  - Make Fall Risk Sign visible to staff  - Offer Toileting every 2 Hours, in advance of need  - Initiate/Maintain bed/chair alarm  - Obtain necessary fall risk management equipment:bed/chair alarm, call bell, walker, gripper socks, rounds  - Apply yellow socks and bracelet for high fall risk patients  - Consider moving patient to room near nurses station  7/30/2022 0721 by Blair Sneed RN  Outcome: Progressing  7/30/2022 0719 by Blair Sneed RN  Outcome: Progressing     Problem: Prexisting or High Potential for Compromised Skin Integrity  Goal: Skin integrity is maintained or improved  Description: INTERVENTIONS:  - Identify patients at risk for skin breakdown  - Assess and monitor skin integrity  - Assess and monitor nutrition and hydration status  - Monitor labs   - Assess for incontinence   - Instruct patient to turn and repostion every 2 hours while in bed and weight shift every 15 minutes while in chair  - Assist with mobility/ambulation  - Relieve pressure over bony prominences  - Avoid friction and shearing  - Provide appropriate hygiene as needed including keeping skin clean and dry  - Evaluate need for skin moisturizer/barrier cream  - Collaborate with interdisciplinary team   - Patient/family teaching  - Consider wound care consult   7/30/2022 0721 by Blair Sneed RN  Outcome: Progressing  7/30/2022 0719 by Blair Sneed RN  Outcome: Progressing     Problem: MOBILITY - ADULT  Goal: Maintain or return to baseline ADL function  Description: INTERVENTIONS:  -  Assess patient's ability to carry out ADLs; assess patient's baseline for ADL function and identify physical deficits which impact ability to perform ADLs (bathing, care of mouth/teeth, toileting, grooming, dressing, etc )  - Assess/evaluate cause of self-care deficits   - Assess range of motion  - Assess patient's mobility; develop plan if impaired  - Assess patient's need for assistive devices and provide as appropriate  - Encourage maximum independence but intervene and supervise when necessary  - Involve family in performance of ADLs  - Assess for home care needs following discharge   - Consider OT consult to assist with ADL evaluation and planning for discharge  - Provide patient education as appropriate  7/30/2022 0721 by Dirk Coppola RN  Outcome: Progressing  7/30/2022 0719 by Dirk Coppola RN  Outcome: Progressing  Goal: Maintains/Returns to pre admission functional level  Description: INTERVENTIONS:  - Perform BMAT or MOVE assessment daily    - Set and communicate daily mobility goal to care team and patient/family/caregiver  - Collaborate with rehabilitation services on mobility goals if consulted  - Perform Range of Motion *** times a day  - Reposition patient every *** hours    - Dangle patient *** times a day  - Stand patient *** times a day  - Ambulate patient *** times a day  - Out of bed to chair *** times a day   - Out of bed for meals *** times a day  - Out of bed for toileting  - Record patient progress and toleration of activity level   7/30/2022 0721 by Dirk Coppola RN  Outcome: Progressing  7/30/2022 0719 by Dirk Coppola RN  Outcome: Progressing     Problem: INFECTION - ADULT  Goal: Absence or prevention of progression during hospitalization  Description: INTERVENTIONS:  - Assess and monitor for signs and symptoms of infection  - Monitor lab/diagnostic results  - Monitor all insertion sites, i e  indwelling lines, tubes, and drains  - Monitor endotracheal if appropriate and nasal secretions for changes in amount and color  - [place] appropriate cooling/warming therapies per order  - Administer medications as ordered  - Instruct and encourage patient and family to use good hand hygiene technique  - Identify and instruct in appropriate isolation precautions for identified infection/condition  7/30/2022 0721 by Poncho Purdy RN  Outcome: Progressing  7/30/2022 0719 by Poncho Purdy RN  Outcome: Progressing     Problem: DISCHARGE PLANNING  Goal: Discharge to home or other facility with appropriate resources  Description: INTERVENTIONS:  - Identify barriers to discharge w/patient and caregiver  - Arrange for needed discharge resources and transportation as appropriate  - Identify discharge learning needs (meds, wound care, etc )  - Arrange for interpretive services to assist at discharge as needed  - Refer to Case Management Department for coordinating discharge planning if the patient needs post-hospital services based on physician/advanced practitioner order or complex needs related to functional status, cognitive ability, or social support system  7/30/2022 0721 by Poncho Purdy RN  Outcome: Progressing  7/30/2022 0719 by Poncho Purdy RN  Outcome: Progressing     Problem: SKIN/TISSUE INTEGRITY - ADULT  Goal: Incision(s), wounds(s) or drain site(s) healing without S/S of infection  Description: INTERVENTIONS  - Assess and document dressing, incision, wound bed, drain sites and surrounding tissue  - Provide patient and family education  - Perform skin care/dressing changes every ***  7/30/2022 0721 by Poncho Purdy RN  Outcome: Progressing  7/30/2022 0719 by Poncho Purdy RN  Outcome: Progressing  Goal: Skin Integrity remains intact(Skin Breakdown Prevention)  Description: Assess:  -Perform Joseph assessment every ***  -Clean and moisturize skin every ***  -Inspect skin when repositioning, toileting, and assisting with ADLS  -Assess under medical devices such as *** every ***  -Assess extremities for adequate circulation and sensation     Bed Management:  -Have minimal linens on bed & keep smooth, unwrinkled  -Change linens as needed when moist or perspiring  -Avoid sitting or lying in one position for more than *** hours while in bed  -Keep HOB at ***degrees     Toileting:  -Offer bedside commode  -Assess for incontinence every ***  -Use incontinent care products after each incontinent episode such as ***    Activity:  -Mobilize patient *** times a day  -Encourage activity and walks on unit  -Encourage or provide ROM exercises   -Turn and reposition patient every *** Hours  -Use appropriate equipment to lift or move patient in bed  -Instruct/ Assist with weight shifting every *** when out of bed in chair  -Consider limitation of chair time *** hour intervals    Skin Care:  -Avoid use of baby powder, tape, friction and shearing, hot water or constrictive clothing  -Relieve pressure over bony prominences using ***  -Do not massage red bony areas    Next Steps:  -Teach patient strategies to minimize risks such as ***   -Consider consults to  interdisciplinary teams such as ***  7/30/2022 0721 by Janice Sanches RN  Outcome: Progressing  7/30/2022 0719 by Janice Sanches RN  Outcome: Progressing     Problem: PAIN - ADULT  Goal: Verbalizes/displays adequate comfort level or baseline comfort level  Description: Interventions:  - Encourage patient to monitor pain and request assistance  - Assess pain using appropriate pain scale  - Administer analgesics based on type and severity of pain and evaluate response  - Implement non-pharmacological measures as appropriate and evaluate response  - Consider cultural and social influences on pain and pain management  - Notify physician/advanced practitioner if interventions unsuccessful or patient reports new pain  7/30/2022 0721 by Janice Sanches RN  Outcome: Progressing  7/30/2022 0719 by Isaac Heath RN  Outcome: Progressing     Problem: CONFUSION/THOUGHT DISTURBANCE  Goal: Thought disturbances (confusion, delirium, depression, dementia or psychosis) are managed to maintain or return to baseline mental status and functional level  Description: INTERVENTIONS:  - Assess for possible contributors to  thought disturbance, including but not limited to medications, infection, impaired vision or hearing, underlying metabolic abnormalities, dehydration, respiratory compromise,  psychiatric diagnoses and notify attending PHYSICAN/AP  - Monitor and intervene to maintain adequate nutrition, hydration, elimination, sleep and activity  - Decrease environmental stimuli, including noise as appropriate  - Provide frequent contacts to provide refocusing, direction and reassurance as needed  Approach patient calmly with eye contact and at their level    - [place] high risk fall precautions, aspiration precautions and other safety measures, as indicated  - If delirium suspected, notify physician/AP of change in condition and request immediate in-person evaluation  - Pursue consults as appropriate including Geriatric (campus dependent), OT for cognitive evaluation/activity planning, psychiatric, pastoral care, etc   7/30/2022 0721 by Isaac Heath RN  Outcome: Progressing  7/30/2022 0719 by Isaac Heath RN  Outcome: Progressing     Problem: GASTROINTESTINAL - ADULT  Goal: Maintains adequate nutritional intake  Description: INTERVENTIONS:  - Monitor percentage of each meal consumed  - Identify factors contributing to decreased intake, treat as appropriate  - Assist with meals as needed  - Monitor I&O, weight, and lab values if indicated  - Obtain nutrition services referral as needed  7/30/2022 0721 by Isaac Heath RN  Outcome: Progressing  7/30/2022 0719 by Isaac Heath RN  Outcome: Progressing     Problem: METABOLIC, FLUID AND ELECTROLYTES - ADULT  Goal: Electrolytes maintained within normal limits  Description: INTERVENTIONS:  - Monitor labs and assess patient for signs and symptoms of electrolyte imbalances  - Administer electrolyte replacement as ordered  - Monitor response to electrolyte replacements, including repeat lab results as appropriate  - Instruct patient on fluid and nutrition as appropriate  7/30/2022 0721 by Mally Nichole RN  Outcome: Progressing  7/30/2022 0719 by Mally Nichole RN  Outcome: Progressing  Goal: Glucose maintained within target range  Description: INTERVENTIONS:  - Monitor Blood Glucose as ordered  - Assess for signs and symptoms of hyperglycemia and hypoglycemia  - Administer ordered medications to maintain glucose within target range  - Assess nutritional intake and initiate nutrition service referral as needed  7/30/2022 0721 by Mally Nichole RN  Outcome: Progressing  7/30/2022 0719 by Mally Nichole RN  Outcome: Progressing     Problem: Nutrition/Hydration-ADULT  Goal: Nutrient/Hydration intake appropriate for improving, restoring or maintaining nutritional needs  Description: Monitor and assess patient's nutrition/hydration status for malnutrition  Collaborate with interdisciplinary team and initiate plan and interventions as ordered  Monitor patient's weight and dietary intake as ordered or per policy  Utilize nutrition screening tool and intervene as necessary  Determine patient's food preferences and provide high-protein, high-caloric foods as appropriate       INTERVENTIONS:  - Monitor oral intake, urinary output, labs, and treatment plans  - Assess nutrition and hydration status and recommend course of action  - Evaluate amount of meals eaten  - Assist patient with eating if necessary   - Allow adequate time for meals  - Recommend/ encourage appropriate diets, oral nutritional supplements, and vitamin/mineral supplements  - Order, calculate, and assess calorie counts as needed  - Recommend, monitor, and adjust tube feedings and TPN/PPN based on assessed needs  - Assess need for intravenous fluids  - Provide specific nutrition/hydration education as appropriate  - Include patient/family/caregiver in decisions related to nutrition  7/30/2022 0721 by Krishna Nguyen RN  Outcome: Progressing  7/30/2022 0719 by Krishna Nguyen, RN  Outcome: Progressing

## 2022-07-30 NOTE — ASSESSMENT & PLAN NOTE
Pathology showing Melanoma  Discussed case with Oncology Jose Juan Garner MD   · Recommendations were: Pathology noted  F/u to be placed with Dr González Aguilar for melanoma medical oncology care  · CT CAP without any mets    · Plastics consult per surgery recommendations for complete excision to be done outpatient  · Dermatology outpatient follow-up for skin exam

## 2022-07-30 NOTE — ASSESSMENT & PLAN NOTE
New onset diabetes mellitus   Unclear with the this was undetected and detected as inpt here or this was precipitated by COVID infection  · Hemoglobin A1c checked and is 8 8

## 2022-07-30 NOTE — PROGRESS NOTES
[facility]  Progress Note - Chaparrita Valdez 1935, 82 y o  male MRN: [id number]  Unit/Bed#: [place] [address] -01 Encounter: [id number]  Primary Care Provider: No primary care provider on file  Date and time admitted to hospital: 7/9/2022 11:02 AM    * Cellulitis  Assessment & Plan  Presented with left leg cellulitis, surrounding wounds, and growth on leg  · Maggots were found in the wound upon presentation to the ED  · Ct lower extremity: "Extensive subcutaneous edema likely representing cellulitis  Although evaluation for abscess is limited in the absence images contrast no obvious discrete collection is identified  Dermal soft tissue lesion seen in association with the posterior medial calf "  · Completed 7 days of IV antibiotics  · Blood cultures negative thus far    Depression  Assessment & Plan  Remeron started per psych recommendations    Malignant melanoma of leg, left [facility])  Assessment & Plan  Pathology showing Melanoma  Discussed case with Oncology Luis Carlos Green MD   · Recommendations were: Pathology noted  F/u to be placed with Dr Neo Tapia for melanoma medical oncology care  · CT CAP without any mets  · Plastics consult per surgery recommendations for complete excision to be done outpatient  · Dermatology outpatient follow-up for skin exam    COVID-19  Assessment & Plan  Detected to be COVID-19 positive on 07/20/2022  Asymptomatic  Will remain in the hospital in quarantine until July 31st    Encounter for competency evaluation  Assessment & Plan  · Neuropsychiatry consultation appreciated, at this point patient does not have capacity to make informed medical decisions    Hypertension  Assessment & Plan  Controlled  - Continue Amlodipine    Type 2 diabetes mellitus with hyperglycemia, without long-term current use of insulin ([facility] [address] )  Assessment & Plan  New onset diabetes mellitus   Unclear with the this was undetected and detected as inpt here or this was precipitated by COVID infection  · Hemoglobin A1c checked and is 8 8    AKUA (acute kidney injury) [facility])  Assessment & Plan  Resolved  VTE Pharmacologic Prophylaxis:   Pharmacologic: Heparin  Mechanical VTE Prophylaxis in Place: Yes    Discussions with Specialists or Other Care Team Provider: nursing    Education and Discussions with Family / Patient: daughter    Time Spent for Care: 30 minutes  More than 50% of total time spent on counseling and coordination of care as described above  Current Length of Stay: 21 day(s)    Current Patient Status: Inpatient   Certification Statement: The patient will continue to require additional inpatient hospital stay due to placement    Discharge Plan: placement    Code Status: Level 1 - Full Code      Subjective:   Patient seen and examined at bedside  Comfortable  No new complaints overnight  Objective:     Vitals:   Temp (24hrs), Av 4 °F (36 9 °C), Min:97 7 °F (36 5 °C), Max:99 1 °F (37 3 °C)    Temp:  [97 7 °F (36 5 °C)-99 1 °F (37 3 °C)] 97 7 °F (36 5 °C)  HR:  [] 100  Resp:  [16-20] 16  BP: (151-168)/(78-90) 157/84  SpO2:  [92 %-94 %] 93 %  Body mass index is 29 79 kg/m²  Input and Output Summary (last 24 hours): Intake/Output Summary (Last 24 hours) at 2022 1434  Last data filed at 2022 1312  Gross per 24 hour   Intake --   Output 250 ml   Net -250 ml       Physical Exam:     Physical Exam  Vitals reviewed  Constitutional:       General: He is not in acute distress  HENT:      Head: Normocephalic  Nose: Nose normal       Mouth/Throat:      Mouth: Mucous membranes are moist    Eyes:      General: No scleral icterus  Cardiovascular:      Rate and Rhythm: Normal rate  Pulmonary:      Effort: Pulmonary effort is normal  No respiratory distress  Abdominal:      Palpations: Abdomen is soft  Tenderness: There is no abdominal tenderness  Skin:     General: Skin is warm  Neurological:      Mental Status: He is alert  Mental status is at baseline  Psychiatric:         Mood and Affect: Mood normal          Behavior: Behavior normal        Additional Data:     Labs:        Results from last 7 days   Lab Units 07/27/22  0435   SODIUM mmol/L 138   POTASSIUM mmol/L 3 5   CHLORIDE mmol/L 101   CO2 mmol/L 25   BUN mg/dL 13   CREATININE mg/dL 0 80   ANION GAP mmol/L 12   CALCIUM mg/dL 8 6   GLUCOSE RANDOM mg/dL 103         Results from last 7 days   Lab Units 07/26/22  1112 07/26/22  0734 07/25/22  2101 07/25/22  1515 07/25/22  1121   POC GLUCOSE mg/dl 129 105 104 143* 124                   * I Have Reviewed All Lab Data Listed Above  * Additional Pertinent Lab Tests Reviewed: [facility] Admission Reviewed    Imaging:    Imaging Reports Reviewed Today Include: no new imaging    Recent Cultures (last 7 days):           Last 24 Hours Medication List:   Current Facility-Administered Medications   Medication Dose Route Frequency Provider Last Rate    acetaminophen  650 mg Oral Q6H PRN Vanessa Kim DO      al mag oxide-diphenhydramine-lidocaine viscous  10 mL Swish & Swallow Q6H PRN Earle Campos MD      amLODIPine  10 mg Oral Daily Joo Martinez MD      aspirin  81 mg Oral Daily Joo Martinez MD      butalbital-acetaminophen-caffeine  1 tablet Oral Q4H PRN Earle Campos MD      heparin (porcine)  5,000 Units Subcutaneous Q8H [address] Vanessa DO Judy      mirtazapine  15 mg Oral HS Seretha Ahumada, MD      oxyCODONE  2 5 mg Oral Q8H PRN Almaz Perez DO      potassium chloride  20 mEq Oral Daily Solitario Lopez MD          Today, Patient Was Seen By: Delbert Bartholomew MD    ** Please Note: Dictation voice to text software may have been used in the creation of this document   **

## 2022-07-31 PROCEDURE — 99232 SBSQ HOSP IP/OBS MODERATE 35: CPT | Performed by: STUDENT IN AN ORGANIZED HEALTH CARE EDUCATION/TRAINING PROGRAM

## 2022-07-31 RX ADMIN — ASPIRIN 81 MG: 81 TABLET, COATED ORAL at 09:14

## 2022-07-31 RX ADMIN — HEPARIN SODIUM 5000 UNITS: 5000 INJECTION INTRAVENOUS; SUBCUTANEOUS at 14:37

## 2022-07-31 RX ADMIN — HEPARIN SODIUM 5000 UNITS: 5000 INJECTION INTRAVENOUS; SUBCUTANEOUS at 05:15

## 2022-07-31 RX ADMIN — AMLODIPINE BESYLATE 10 MG: 10 TABLET ORAL at 09:15

## 2022-07-31 RX ADMIN — MIRTAZAPINE 15 MG: 15 TABLET, FILM COATED ORAL at 21:47

## 2022-07-31 RX ADMIN — HEPARIN SODIUM 5000 UNITS: 5000 INJECTION INTRAVENOUS; SUBCUTANEOUS at 21:47

## 2022-07-31 RX ADMIN — POTASSIUM CHLORIDE 20 MEQ: 1500 TABLET, EXTENDED RELEASE ORAL at 09:14

## 2022-07-31 RX ADMIN — ACETAMINOPHEN 650 MG: 325 TABLET, FILM COATED ORAL at 14:45

## 2022-07-31 NOTE — PROGRESS NOTES
[facility]  Progress Note - Norm Sos 1935, 82 y o  male MRN: [id number]  Unit/Bed#: [place] [address] -01 Encounter: [id number]  Primary Care Provider: No primary care provider on file  Date and time admitted to hospital: 7/9/2022 11:02 AM    * Cellulitis  Assessment & Plan  Presented with left leg cellulitis, surrounding wounds, and growth on leg  · Maggots were found in the wound upon presentation to the ED  · Ct lower extremity: "Extensive subcutaneous edema likely representing cellulitis  Although evaluation for abscess is limited in the absence images contrast no obvious discrete collection is identified  Dermal soft tissue lesion seen in association with the posterior medial calf "  · Completed 7 days of IV antibiotics  · Blood cultures negative thus far    Depression  Assessment & Plan  Remeron started per psych recommendations    Malignant melanoma of leg, left [facility])  Assessment & Plan  Pathology showing Melanoma  Discussed case with Oncology Doreen Soto MD   · Recommendations were: Pathology noted  F/u to be placed with Dr Roger Kang for melanoma medical oncology care  · CT CAP without any mets  · Plastics consult per surgery recommendations for complete excision to be done outpatient  · Dermatology outpatient follow-up for skin exam    COVID-19  Assessment & Plan  Detected to be COVID-19 positive on 07/20/2022  Asymptomatic  Now off isolation  Encounter for competency evaluation  Assessment & Plan  · Neuropsychiatry consultation appreciated, at this point patient does not have capacity to make informed medical decisions    Hypertension  Assessment & Plan  Controlled  - Continue Amlodipine    Type 2 diabetes mellitus with hyperglycemia, without long-term current use of insulin (HCC)  Assessment & Plan  New onset diabetes mellitus   Unclear with the this was undetected and detected as inpt here or this was precipitated by COVID infection  · Hemoglobin A1c checked and is 8 8    AKUA (acute kidney injury) [facility])  Assessment & Plan  Resolved  VTE Pharmacologic Prophylaxis:   Pharmacologic: Heparin  Mechanical VTE Prophylaxis in Place: Yes    Discussions with Specialists or Other Care Team Provider: nursing    Education and Discussions with Family / Patient: patient    Time Spent for Care: 30 minutes  More than 50% of total time spent on counseling and coordination of care as described above  Current Length of Stay: 22 day(s)    Current Patient Status: Inpatient   Certification Statement: The patient will continue to require additional inpatient hospital stay due to placement    Discharge Plan: placement    Code Status: Level 1 - Full Code      Subjective:   Patient seen and examined at bedside  Comfortable  No new issues overnight  Objective:     Vitals:   Temp (24hrs), Av 5 °F (36 4 °C), Min:97 3 °F (36 3 °C), Max:97 9 °F (36 6 °C)    Temp:  [97 3 °F (36 3 °C)-97 9 °F (36 6 °C)] 97 3 °F (36 3 °C)  HR:  [101-106] 106  Resp:  [16-18] 16  BP: (147-154)/(79-82) 147/79  SpO2:  [92 %-95 %] 92 %  Body mass index is 29 79 kg/m²  Input and Output Summary (last 24 hours): Intake/Output Summary (Last 24 hours) at 2022 1254  Last data filed at 2022 1843  Gross per 24 hour   Intake 360 ml   Output 50 ml   Net 310 ml       Physical Exam:     Physical Exam  Vitals reviewed  Constitutional:       General: He is not in acute distress  HENT:      Head: Normocephalic  Nose: Nose normal       Mouth/Throat:      Mouth: Mucous membranes are moist    Eyes:      General: No scleral icterus  Cardiovascular:      Rate and Rhythm: Normal rate  Pulmonary:      Effort: Pulmonary effort is normal  No respiratory distress  Abdominal:      Palpations: Abdomen is soft  Tenderness: There is no abdominal tenderness  Musculoskeletal:      Comments: Left leg dressing intact   Skin:     General: Skin is warm     Neurological:      Mental Status: He is alert  Mental status is at baseline  Psychiatric:         Mood and Affect: Mood normal          Behavior: Behavior normal        Additional Data:     Labs:        Results from last 7 days   Lab Units 07/27/22  0435   SODIUM mmol/L 138   POTASSIUM mmol/L 3 5   CHLORIDE mmol/L 101   CO2 mmol/L 25   BUN mg/dL 13   CREATININE mg/dL 0 80   ANION GAP mmol/L 12   CALCIUM mg/dL 8 6   GLUCOSE RANDOM mg/dL 103         Results from last 7 days   Lab Units 07/26/22  1112 07/26/22  0734 07/25/22  2101 07/25/22  1515 07/25/22  1121   POC GLUCOSE mg/dl 129 105 104 143* 124                   * I Have Reviewed All Lab Data Listed Above  * Additional Pertinent Lab Tests Reviewed: [facility] [place] Admission Reviewed      Lines:  Invasive Devices  Report    Peripheral Intravenous Line  Duration           Peripheral IV 07/30/22 Proximal;Right;Ventral (anterior) Forearm 1 day               Imaging:    Imaging Reports Reviewed Today Include: no new imagnig    Recent Cultures (last 7 days):           Last 24 Hours Medication List:   Current Facility-Administered Medications   Medication Dose Route Frequency Provider Last Rate    acetaminophen  650 mg Oral Q6H PRN Vanessa Kim DO      al mag oxide-diphenhydramine-lidocaine viscous  10 mL Swish & Swallow Q6H PRN Cody Tillman MD      amLODIPine  10 mg Oral Daily Oc Chapman MD      aspirin  81 mg Oral Daily Oc Chapman MD      butalbital-acetaminophen-caffeine  1 tablet Oral Q4H PRN Cody Tillman MD      heparin (porcine)  5,000 Units Subcutaneous Q8H [address] Vanessa Kim DO      mirtazapine  15 mg Oral HS Jaylan Caldewll MD      oxyCODONE  2 5 mg Oral Q8H PRN Judge Ismael DO      potassium chloride  20 mEq Oral Daily Keisha Cuellar MD          Today, Patient Was Seen By: Alessandra Dixon MD    ** Please Note: Dictation voice to text software may have been used in the creation of this document   **

## 2022-07-31 NOTE — CASE MANAGEMENT
Case Management Discharge Planning Note    Patient name Milagros George  Location [place] 2 /[facility] Dai Quan* MRN [id number]  : 1935 Date 2022       Current Admission Date: 2022  Current Admission Diagnosis:Cellulitis   Patient Active Problem List    Diagnosis Date Noted    Depression 2022    COVID-19 2022    Mal[facility] ) 2022    Encounter for competency evaluation 2022    Hypertension 2022    AKUA (acute kidney injury) ([facility] ) 2022    Cellulitis 2022    Leg mass 2022    Type 2 diabetes mellitus with hyperglycemia, without long-term current use of insulin ([place] [address] ) 2022      LOS (days): 22  Geometric Mean LOS (GMLOS) (days): 4 60  Days to GMLOS:-17 2     OBJECTIVE:  Risk of Unplanned Readmission Score: 12 32         Current admission status: Inpatient   Preferred Pharmacy:   [address] [place] [address] [id number] [place] [address]  Phone: [phone] Fax: [phone]    Primary Care Provider: No primary care provider on file  Primary Insurance: MEDICARE  Secondary Insurance: [address] [place] [address]    DISCHARGE DETAILS:                                                                                        IMM Given (Date):: 22  IMM Given to[de-identified] Family (Completed via phone with pt's daughter, Marisoljohan Petty  Original placed in scan bin   Copy placed in pt's d/c folder as requested by pt's daughter )

## 2022-07-31 NOTE — CASE MANAGEMENT
Case Management Discharge Planning Note    Patient name London Bello  Location [place] 2 /[place] [facility] Raphael Virgen* MRN [id number]  : 1935 Date 2022       Current Admission Date: 2022  Current Admission Diagnosis:Cellulitis   Patient Active Problem List    Diagnosis Date Noted    Depression 2022    COVID-19 2022    [facility] 20[facility]    Encounter for competency evaluation 2022    Hypertension 2022    AKUA (acute kidney injury) ([facility] ) 2022    Cellulitis 2022    Leg mass 2022    Type 2 diabetes mellitus with hyperglycemia, without long-term current use of insulin ([place] [address] ) 2022      LOS (days): 22  Geometric Mean LOS (GMLOS) (days): 4 60  Days to GMLOS:-17 1     OBJECTIVE:  Risk of Unplanned Readmission Score: 12 29         Current admission status: Inpatient   Preferred Pharmacy:   [address] [place] [address] [id number] [place] [address]  Phone: [phone] Fax: [phone]    Primary Care Provider: No primary care provider on file      Primary Insurance: MEDICARE  Secondary Insurance: Lawrence DUNCAN    DISCHARGE DETAILS:                           Contacts  Patient Contacts: Felicita Villa  Relationship to Patient[de-identified] Family  Contact Method: Phone (No answer, VM left )  Phone Number: [phone]  Reason/Outcome: Discharge Planning (IMM completion)

## 2022-07-31 NOTE — PLAN OF CARE
Problem: Potential for Falls  Goal: Patient will remain free of falls  Description: INTERVENTIONS:  - Educate patient/family on patient safety including physical limitations  - Instruct patient to call for assistance with activity   - Consult OT/PT to assist with strengthening/mobility   - Keep Call bell within reach  - Keep bed low and locked with side rails adjusted as appropriate  - Keep care items and personal belongings within reach  - Initiate and maintain comfort rounds  - Make Fall Risk Sign visible to staff  - Offer Toileting every 2 Hours, in advance of need  - Initiate/Maintain bed/chair alarm  - Obtain necessary fall risk management equipment:bed/chair alarm, call bell, walker, gripper socks, rounds  - Apply yellow socks and bracelet for high fall risk patients  - Consider moving patient to room near nurses station  Outcome: Progressing     Problem: Prexisting or High Potential for Compromised Skin Integrity  Goal: Skin integrity is maintained or improved  Description: INTERVENTIONS:  - Identify patients at risk for skin breakdown  - Assess and monitor skin integrity  - Assess and monitor nutrition and hydration status  - Monitor labs   - Assess for incontinence   - Instruct patient to turn and repostion every 2 hours while in bed and weight shift every 15 minutes while in chair  - Assist with mobility/ambulation  - Relieve pressure over bony prominences  - Avoid friction and shearing  - Provide appropriate hygiene as needed including keeping skin clean and dry  - Evaluate need for skin moisturizer/barrier cream  - Collaborate with interdisciplinary team   - Patient/family teaching  - Consider wound care consult   Outcome: Progressing     Problem: MOBILITY - ADULT  Goal: Maintain or return to baseline ADL function  Description: INTERVENTIONS:  -  Assess patient's ability to carry out ADLs; assess patient's baseline for ADL function and identify physical deficits which impact ability to perform ADLs (bathing, care of mouth/teeth, toileting, grooming, dressing, etc )  - Assess/evaluate cause of self-care deficits   - Assess range of motion  - Assess patient's mobility; develop plan if impaired  - Assess patient's need for assistive devices and provide as appropriate  - Encourage maximum independence but intervene and supervise when necessary  - Involve family in performance of ADLs  - Assess for home care needs following discharge   - Consider OT consult to assist with ADL evaluation and planning for discharge  - Provide patient education as appropriate  Outcome: Progressing  Goal: Maintains/Returns to pre admission functional level  Description: INTERVENTIONS:  - Perform BMAT or MOVE assessment daily    - Set and communicate daily mobility goal to care team and patient/family/caregiver  - Collaborate with rehabilitation services on mobility goals if consulted  - Perform Range of Motion 3 times a day  - Reposition patient every 3 hours    - Dangle patient 3 times a day  - Stand patient 3 times a day  - Ambulate patient 3 times a day  - Out of bed to chair 3 times a day   - Out of bed for meals 3 times a day  - Out of bed for toileting  - Record patient progress and toleration of activity level   Outcome: Progressing     Problem: INFECTION - ADULT  Goal: Absence or prevention of progression during hospitalization  Description: INTERVENTIONS:  - Assess and monitor for signs and symptoms of infection  - Monitor lab/diagnostic results  - Monitor all insertion sites, i e  indwelling lines, tubes, and drains  - Monitor endotracheal if appropriate and nasal secretions for changes in amount and color  - [place] appropriate cooling/warming therapies per order  - Administer medications as ordered  - Instruct and encourage patient and family to use good hand hygiene technique  - Identify and instruct in appropriate isolation precautions for identified infection/condition  Outcome: Progressing     Problem: DISCHARGE PLANNING  Goal: Discharge to home or other facility with appropriate resources  Description: INTERVENTIONS:  - Identify barriers to discharge w/patient and caregiver  - Arrange for needed discharge resources and transportation as appropriate  - Identify discharge learning needs (meds, wound care, etc )  - Arrange for interpretive services to assist at discharge as needed  - Refer to Case Management Department for coordinating discharge planning if the patient needs post-hospital services based on physician/advanced practitioner order or complex needs related to functional status, cognitive ability, or social support system  Outcome: Progressing     Problem: SKIN/TISSUE INTEGRITY - ADULT  Goal: Incision(s), wounds(s) or drain site(s) healing without S/S of infection  Description: INTERVENTIONS  - Assess and document dressing, incision, wound bed, drain sites and surrounding tissue  - Provide patient and family education  - Perform skin care/dressing changes every shift  Outcome: Progressing  Goal: Skin Integrity remains intact(Skin Breakdown Prevention)  Description: Assess:  -Perform Joseph assessment every shift  -Clean and moisturize skin every shift  -Inspect skin when repositioning, toileting, and assisting with ADLS  -Assess under medical devices such as bp cuffs every 2 hours  -Assess extremities for adequate circulation and sensation     Bed Management:  -Have minimal linens on bed & keep smooth, unwrinkled  -Change linens as needed when moist or perspiring  -Avoid sitting or lying in one position for more than 2 hours while in bed  -Keep HOB at 30 degrees     Toileting:  -Offer bedside commode  -Assess for incontinence every 2 hrs  -Use incontinent care products after each incontinent episode such as pads    Activity:  -Mobilize patient 2 times a day  -Encourage activity and walks on unit  -Encourage or provide ROM exercises   -Turn and reposition patient every 2 Hours  -Use appropriate equipment to lift or move patient in bed  -Instruct/ Assist with weight shifting every 2 when out of bed in chair  -Consider limitation of chair time 2 hour intervals    Skin Care:  -Avoid use of baby powder, tape, friction and shearing, hot water or constrictive clothing  -Relieve pressure over bony prominences using foam dressings  -Do not massage red bony areas    Next Steps:  -Teach patient strategies to minimize risks such as    -Consider consults to  interdisciplinary teams such as   Outcome: Progressing     Problem: PAIN - ADULT  Goal: Verbalizes/displays adequate comfort level or baseline comfort level  Description: Interventions:  - Encourage patient to monitor pain and request assistance  - Assess pain using appropriate pain scale  - Administer analgesics based on type and severity of pain and evaluate response  - Implement non-pharmacological measures as appropriate and evaluate response  - Consider cultural and social influences on pain and pain management  - Notify physician/advanced practitioner if interventions unsuccessful or patient reports new pain  Outcome: Progressing     Problem: CONFUSION/THOUGHT DISTURBANCE  Goal: Thought disturbances (confusion, delirium, depression, dementia or psychosis) are managed to maintain or return to baseline mental status and functional level  Description: INTERVENTIONS:  - Assess for possible contributors to  thought disturbance, including but not limited to medications, infection, impaired vision or hearing, underlying metabolic abnormalities, dehydration, respiratory compromise,  psychiatric diagnoses and notify attending PHYSICAN/AP  - Monitor and intervene to maintain adequate nutrition, hydration, elimination, sleep and activity  - Decrease environmental stimuli, including noise as appropriate  - Provide frequent contacts to provide refocusing, direction and reassurance as needed  Approach patient calmly with eye contact and at their level    - [place] high risk fall precautions, aspiration precautions and other safety measures, as indicated  - If delirium suspected, notify physician/AP of change in condition and request immediate in-person evaluation  - Pursue consults as appropriate including Geriatric (campus dependent), OT for cognitive evaluation/activity planning, psychiatric, pastoral care, etc   Outcome: Progressing     Problem: GASTROINTESTINAL - ADULT  Goal: Maintains adequate nutritional intake  Description: INTERVENTIONS:  - Monitor percentage of each meal consumed  - Identify factors contributing to decreased intake, treat as appropriate  - Assist with meals as needed  - Monitor I&O, weight, and lab values if indicated  - Obtain nutrition services referral as needed  Outcome: Progressing     Problem: METABOLIC, FLUID AND ELECTROLYTES - ADULT  Goal: Electrolytes maintained within normal limits  Description: INTERVENTIONS:  - Monitor labs and assess patient for signs and symptoms of electrolyte imbalances  - Administer electrolyte replacement as ordered  - Monitor response to electrolyte replacements, including repeat lab results as appropriate  - Instruct patient on fluid and nutrition as appropriate  Outcome: Progressing  Goal: Glucose maintained within target range  Description: INTERVENTIONS:  - Monitor Blood Glucose as ordered  - Assess for signs and symptoms of hyperglycemia and hypoglycemia  - Administer ordered medications to maintain glucose within target range  - Assess nutritional intake and initiate nutrition service referral as needed  Outcome: Progressing     Problem: Nutrition/Hydration-ADULT  Goal: Nutrient/Hydration intake appropriate for improving, restoring or maintaining nutritional needs  Description: Monitor and assess patient's nutrition/hydration status for malnutrition  Collaborate with interdisciplinary team and initiate plan and interventions as ordered  Monitor patient's weight and dietary intake as ordered or per policy   Utilize nutrition screening tool and intervene as necessary  Determine patient's food preferences and provide high-protein, high-caloric foods as appropriate       INTERVENTIONS:  - Monitor oral intake, urinary output, labs, and treatment plans  - Assess nutrition and hydration status and recommend course of action  - Evaluate amount of meals eaten  - Assist patient with eating if necessary   - Allow adequate time for meals  - Recommend/ encourage appropriate diets, oral nutritional supplements, and vitamin/mineral supplements  - Order, calculate, and assess calorie counts as needed  - Recommend, monitor, and adjust tube feedings and TPN/PPN based on assessed needs  - Assess need for intravenous fluids  - Provide specific nutrition/hydration education as appropriate  - Include patient/family/caregiver in decisions related to nutrition  Outcome: Progressing

## 2022-07-31 NOTE — ASSESSMENT & PLAN NOTE
Pathology showing Melanoma  Discussed case with Oncology Felisha Marsh MD   · Recommendations were: Pathology noted  F/u to be placed with Dr Juju Arenas for melanoma medical oncology care  · CT CAP without any mets    · Plastics consult per surgery recommendations for complete excision to be done outpatient  · Dermatology outpatient follow-up for skin exam

## 2022-08-01 PROCEDURE — 97110 THERAPEUTIC EXERCISES: CPT

## 2022-08-01 PROCEDURE — 99232 SBSQ HOSP IP/OBS MODERATE 35: CPT | Performed by: STUDENT IN AN ORGANIZED HEALTH CARE EDUCATION/TRAINING PROGRAM

## 2022-08-01 RX ADMIN — BUTALBITAL, ACETAMINOPHEN AND CAFFEINE 1 TABLET: 50; 325; 40 TABLET ORAL at 21:38

## 2022-08-01 RX ADMIN — HEPARIN SODIUM 5000 UNITS: 5000 INJECTION INTRAVENOUS; SUBCUTANEOUS at 21:37

## 2022-08-01 RX ADMIN — POTASSIUM CHLORIDE 20 MEQ: 1500 TABLET, EXTENDED RELEASE ORAL at 08:18

## 2022-08-01 RX ADMIN — AMLODIPINE BESYLATE 10 MG: 10 TABLET ORAL at 08:18

## 2022-08-01 RX ADMIN — MIRTAZAPINE 15 MG: 15 TABLET, FILM COATED ORAL at 21:37

## 2022-08-01 RX ADMIN — HEPARIN SODIUM 5000 UNITS: 5000 INJECTION INTRAVENOUS; SUBCUTANEOUS at 05:59

## 2022-08-01 RX ADMIN — HEPARIN SODIUM 5000 UNITS: 5000 INJECTION INTRAVENOUS; SUBCUTANEOUS at 14:00

## 2022-08-01 RX ADMIN — ASPIRIN 81 MG: 81 TABLET, COATED ORAL at 08:18

## 2022-08-01 NOTE — PLAN OF CARE
Problem: Potential for Falls  Goal: Patient will remain free of falls  Description: INTERVENTIONS:  - Educate patient/family on patient safety including physical limitations  - Instruct patient to call for assistance with activity   - Consult OT/PT to assist with strengthening/mobility   - Keep Call bell within reach  - Keep bed low and locked with side rails adjusted as appropriate  - Keep care items and personal belongings within reach  - Initiate and maintain comfort rounds  - Make Fall Risk Sign visible to staff  - Offer Toileting every 2 Hours, in advance of need  - Initiate/Maintain bed/chair alarm  - Obtain necessary fall risk management equipment:bed/chair alarm, call bell, walker, gripper socks, rounds  - Apply yellow socks and bracelet for high fall risk patients  - Consider moving patient to room near nurses station  Outcome: Progressing     Problem: Prexisting or High Potential for Compromised Skin Integrity  Goal: Skin integrity is maintained or improved  Description: INTERVENTIONS:  - Identify patients at risk for skin breakdown  - Assess and monitor skin integrity  - Assess and monitor nutrition and hydration status  - Monitor labs   - Assess for incontinence   - Instruct patient to turn and repostion every 2 hours while in bed and weight shift every 15 minutes while in chair  - Assist with mobility/ambulation  - Relieve pressure over bony prominences  - Avoid friction and shearing  - Provide appropriate hygiene as needed including keeping skin clean and dry  - Evaluate need for skin moisturizer/barrier cream  - Collaborate with interdisciplinary team   - Patient/family teaching  - Consider wound care consult   Outcome: Progressing     Problem: MOBILITY - ADULT  Goal: Maintain or return to baseline ADL function  Description: INTERVENTIONS:  -  Assess patient's ability to carry out ADLs; assess patient's baseline for ADL function and identify physical deficits which impact ability to perform ADLs (bathing, care of mouth/teeth, toileting, grooming, dressing, etc )  - Assess/evaluate cause of self-care deficits   - Assess range of motion  - Assess patient's mobility; develop plan if impaired  - Assess patient's need for assistive devices and provide as appropriate  - Encourage maximum independence but intervene and supervise when necessary  - Involve family in performance of ADLs  - Assess for home care needs following discharge   - Consider OT consult to assist with ADL evaluation and planning for discharge  - Provide patient education as appropriate  Outcome: Progressing  Goal: Maintains/Returns to pre admission functional level  Description: INTERVENTIONS:  - Perform BMAT or MOVE assessment daily    - Set and communicate daily mobility goal to care team and patient/family/caregiver     - Collaborate with rehabilitation services on mobility goals if consulted  - Out of bed for toileting  - Record patient progress and toleration of activity level   Outcome: Progressing     Problem: INFECTION - ADULT  Goal: Absence or prevention of progression during hospitalization  Description: INTERVENTIONS:  - Assess and monitor for signs and symptoms of infection  - Monitor lab/diagnostic results  - Monitor all insertion sites, i e  indwelling lines, tubes, and drains  - Monitor endotracheal if appropriate and nasal secretions for changes in amount and color  - Oral appropriate cooling/warming therapies per order  - Administer medications as ordered  - Instruct and encourage patient and family to use good hand hygiene technique  - Identify and instruct in appropriate isolation precautions for identified infection/condition  Outcome: Progressing     Problem: DISCHARGE PLANNING  Goal: Discharge to home or other facility with appropriate resources  Description: INTERVENTIONS:  - Identify barriers to discharge w/patient and caregiver  - Arrange for needed discharge resources and transportation as appropriate  - Identify discharge learning needs (meds, wound care, etc )  - Arrange for interpretive services to assist at discharge as needed  - Refer to Case Management Department for coordinating discharge planning if the patient needs post-hospital services based on physician/advanced practitioner order or complex needs related to functional status, cognitive ability, or social support system  Outcome: Progressing     Problem: SKIN/TISSUE INTEGRITY - ADULT  Goal: Incision(s), wounds(s) or drain site(s) healing without S/S of infection  Description: INTERVENTIONS  - Assess and document dressing, incision, wound bed, drain sites and surrounding tissue  - Provide patient and family education  Outcome: Progressing  Goal: Skin Integrity remains intact(Skin Breakdown Prevention)  Description: Assess:  -Inspect skin when repositioning, toileting, and assisting with ADLS  -Assess extremities for adequate circulation and sensation     Bed Management:  -Have minimal linens on bed & keep smooth, unwrinkled  -Change linens as needed when moist or perspiring    Toileting:  -Offer bedside commode    Activity:  -Encourage activity and walks on unit  -Encourage or provide ROM exercises   -Use appropriate equipment to lift or move patient in bed    Skin Care:  -Avoid use of baby powder, tape, friction and shearing, hot water or constrictive clothing  -Do not massage red bony areas       Problem: PAIN - ADULT  Goal: Verbalizes/displays adequate comfort level or baseline comfort level  Description: Interventions:  - Encourage patient to monitor pain and request assistance  - Assess pain using appropriate pain scale  - Administer analgesics based on type and severity of pain and evaluate response  - Implement non-pharmacological measures as appropriate and evaluate response  - Consider cultural and social influences on pain and pain management  - Notify physician/advanced practitioner if interventions unsuccessful or patient reports new pain  Outcome: Progressing     Problem: CONFUSION/THOUGHT DISTURBANCE  Goal: Thought disturbances (confusion, delirium, depression, dementia or psychosis) are managed to maintain or return to baseline mental status and functional level  Description: INTERVENTIONS:  - Assess for possible contributors to  thought disturbance, including but not limited to medications, infection, impaired vision or hearing, underlying metabolic abnormalities, dehydration, respiratory compromise,  psychiatric diagnoses and notify attending PHYSICAN/AP  - Monitor and intervene to maintain adequate nutrition, hydration, elimination, sleep and activity  - Decrease environmental stimuli, including noise as appropriate  - Provide frequent contacts to provide refocusing, direction and reassurance as needed  Approach patient calmly with eye contact and at their level    - [place] high risk fall precautions, aspiration precautions and other safety measures, as indicated  - If delirium suspected, notify physician/AP of change in condition and request immediate in-person evaluation  - Pursue consults as appropriate including Geriatric (campus dependent), OT for cognitive evaluation/activity planning, psychiatric, pastoral care, etc   Outcome: Progressing     Problem: GASTROINTESTINAL - ADULT  Goal: Maintains adequate nutritional intake  Description: INTERVENTIONS:  - Monitor percentage of each meal consumed  - Identify factors contributing to decreased intake, treat as appropriate  - Assist with meals as needed  - Monitor I&O, weight, and lab values if indicated  - Obtain nutrition services referral as needed  Outcome: Progressing     Problem: METABOLIC, FLUID AND ELECTROLYTES - ADULT  Goal: Electrolytes maintained within normal limits  Description: INTERVENTIONS:  - Monitor labs and assess patient for signs and symptoms of electrolyte imbalances  - Administer electrolyte replacement as ordered  - Monitor response to electrolyte replacements, including repeat lab results as appropriate  - Instruct patient on fluid and nutrition as appropriate  Outcome: Progressing  Goal: Glucose maintained within target range  Description: INTERVENTIONS:  - Monitor Blood Glucose as ordered  - Assess for signs and symptoms of hyperglycemia and hypoglycemia  - Administer ordered medications to maintain glucose within target range  - Assess nutritional intake and initiate nutrition service referral as needed  Outcome: Progressing     Problem: Nutrition/Hydration-ADULT  Goal: Nutrient/Hydration intake appropriate for improving, restoring or maintaining nutritional needs  Description: Monitor and assess patient's nutrition/hydration status for malnutrition  Collaborate with interdisciplinary team and initiate plan and interventions as ordered  Monitor patient's weight and dietary intake as ordered or per policy  Utilize nutrition screening tool and intervene as necessary  Determine patient's food preferences and provide high-protein, high-caloric foods as appropriate       INTERVENTIONS:  - Monitor oral intake, urinary output, labs, and treatment plans  - Assess nutrition and hydration status and recommend course of action  - Evaluate amount of meals eaten  - Assist patient with eating if necessary   - Allow adequate time for meals  - Recommend/ encourage appropriate diets, oral nutritional supplements, and vitamin/mineral supplements  - Order, calculate, and assess calorie counts as needed  - Recommend, monitor, and adjust tube feedings and TPN/PPN based on assessed needs  - Assess need for intravenous fluids  - Provide specific nutrition/hydration education as appropriate  - Include patient/family/caregiver in decisions related to nutrition  Outcome: Progressing

## 2022-08-01 NOTE — PHYSICAL THERAPY NOTE
PHYSICAL THERAPY NOTE          Patient Name: Ameena Medina  [id number] Date: 8/1/2022  Time: 2369-7690       08/01/22 1434   PT Last Visit   PT Visit Date 08/01/22   Note Type   Note Type Treatment   Pain Assessment   Pain Assessment Tool 0-10   Pain Score No Pain   Restrictions/Precautions   Other Precautions Cognitive; Chair Alarm; Bed Alarm; Fall Risk   General   Chart Reviewed Yes   Additional Pertinent History frequent complaints of dizziness and SOB  vitals during session (post amb): 151/84, 106, 95% on RA   Response to Previous Treatment Patient unable to report, no changes reported from family or staff   Cognition   Overall Cognitive Status Impaired   Arousal/Participation Responsive   Attention Attends with cues to redirect   Orientation Level Oriented to person;Oriented to place;Oriented to time  (general to time/ place)   Memory Decreased recall of precautions;Decreased recall of recent events;Decreased short term memory   Following Commands Follows one step commands without difficulty   Subjective   Subjective "I dont give a damn!"   Bed Mobility   Supine to Sit 5  Supervision   Additional items HOB elevated; Bedrails; Increased time required   Sit to Supine 5  Supervision   Additional items HOB elevated; Increased time required   Transfers   Sit to Stand 5  Supervision   Additional items Bedrails;Verbal cues; Other  (RW)   Stand to Sit 5  Supervision   Additional items Increased time required; Other  (RW)   Toilet transfer 5  Supervision   Additional items Increased time required;Verbal cues;Standard toilet; Other  (grab bar, RW)   Ambulation/Elevation   Gait pattern Improper Weight shift;Decreased foot clearance; Short stride; Excessively slow   Gait Assistance 5  Supervision   Additional items Assist x 1   Assistive Device Rolling walker   Distance 10', 20'   Balance   Static Standing Fair   Dynamic Standing Fair - Ambulatory Fair -   Endurance Deficit   Endurance Deficit Yes   Endurance Deficit Description pt reporting dizziness, SOB  see above for vitals recorded during session  self limiting at times   Activity Tolerance   Activity Tolerance Treatment limited secondary to agitation; Other (Comment)  (pt reported dizziness, SOB  cognition/ effort)   Nurse Made Aware Alda GRIFFIN   Assessment   Prognosis Fair   Problem List Decreased endurance; Impaired balance;Decreased mobility; Decreased cognition; Impaired judgement;Decreased safety awareness   Assessment Erle Ranks was seen for a f/u session and to update POC as appropriate  Pt progress in therapy to date appears primarily limited by cognitive deficits / willingness to participate  Consistently functioning at S level for bed mobility, transfers and ambulation  No gross LOB observed w use of RW  Able to ambulate household distances however assessment of ambulatory endurance limited by cognition, agitation  Given baseline cognitive deficits would recommend transition to more supervised environment w PT services as appropriate to address generalized deconditioning  Barriers to Discharge Decreased caregiver support   Barriers to Discharge Comments lives alone   Goals   Patient Goals rest   STG Expiration Date (S)  08/15/22   Short Term Goal #1 (S)  1)  Pt will perform bed mobility with Massimo demonstrating appropriate technique 100% of the time in order to improve function  2)  Perform all transfers with Massimo demonstrating safe and appropriate technique 100% of the time in order to improve ability to negotiate safely in home environment  3) Amb with least restrictive AD > 50'x1 with mod I in order to demonstrate ability to negotiate in home environment  4)  Improve overall strength and balance 1/2 grade in order to optimize ability to perform functional tasks and reduce fall risk  5) Increase activity tolerance to 45 minutes in order to improve endurance to functional tasks  6) PT for ongoing patient and family/caregiver education, DME needs and d/c planning in order to promote highest level of function in least restrictive environment  PT Treatment Day 4   Plan   Treatment/Interventions Functional transfer training;LE strengthening/ROM; Therapeutic exercise; Endurance training;Patient/family training;Cognitive reorientation;Equipment eval/education; Bed mobility;Gait training;Spoke to nursing; Compensatory technique education   Progress Slow progress, cognitive deficits   PT Frequency 1-2x/wk   Recommendation   PT Discharge Recommendation   (24/7 supervised environment)   AM-PAC Basic Mobility Inpatient   Turning in Bed Without Bedrails 4   Lying on Back to Sitting on Edge of Flat Bed 3   Moving Bed to Chair 3   Standing Up From Chair 3   Walk in Room 3   Climb 3-5 Stairs 3   Basic Mobility Inpatient Raw Score 19   Basic Mobility Standardized Score 42 48   Highest Level Of Mobility   JH-HLM Goal 6: Walk 10 steps or more   JH-HLM Achieved 6: Walk 10 steps or more   Education   Education Provided Mobility training   Patient Demonstrates acceptance/verbal understanding;Reinforcement needed   End of Consult   Patient Position at End of Consult Supine;Bed/Chair alarm activated; All needs within reach       Teresa Standard, PT

## 2022-08-01 NOTE — WOUND OSTOMY CARE
Progress Note - Wound   Martha Narayanan 82 y o  male MRN: [id number]  Unit/Bed#: [place] [facility] 2 -01 Encounter: [id number]        Assessment:   Patient is seen for wound follow up  Patient examined in bed  Patient drowsy during exam but able to raise leg  Patient is to follow up outpatient with oncology for his leg mass  Findings  1  Left leg mass            See flowsheets for details    Wound Care Plan:   1-Hydraguard lotion to right lower leg, bilateral buttocks, sacrum, and heels three times daily and as needed  2-Elevate heels off of bed/chair surface to offload pressure  3-Offloading air cushion in chair when out of bed  4-Moisturize skin daily with skin nourishing cream    5-Turn/reposition every 2 hours while in bed and weight shift frequently while in chair for pressure re-distribution on skin     6-Left leg--cleanse with normal saline, pat dry   Apply Hydraguard lotion to intact skin on lower leg   Apply adaptic to open areas/mass   Cover with ABDs   Wrap with rolled gauze   Change dressing every other day  Call or tigertext with any questions  Wound Care will continue to follow    Wound 07/09/22 Malignant  Leg Posterior; Left (Active)   Wound Image   08/01/22 0945   Wound Description Clean;Dry; Intact;Fragile 08/01/22 0945   Hoda-wound Assessment Clean;Dry; Intact;Scaly 08/01/22 0945   Wound Length (cm) 3 7 cm 08/01/22 0945   Wound Width (cm) 3 cm 08/01/22 0945   Wound Depth (cm) 0 1 cm 07/25/22 1528   Wound Surface Area (cm^2) 11 1 cm^2 08/01/22 0945   Wound Volume (cm^3) 0 03 cm^3 07/25/22 1528   Calculated Wound Volume (cm^3) 0 03 cm^3 07/25/22 1528   Change in Wound Size % 99 83 07/25/22 1528   Wound Site Closure Unable to assess 07/31/22 0046   Drainage Amount MOTNY 07/31/22 0046   Drainage Description MONTY 07/31/22 0046   Treatments Elevated 07/29/22 2100   Dressing Non adherent;ABD 08/01/22 0945   Wound packed?  No 07/21/22 0122   Dressing Changed Reinforced 08/01/22 0823   Patient Tolerance Tolerated well 08/01/22 9282   Dressing Status Clean;Dry; Intact 08/01/22 8295

## 2022-08-01 NOTE — PROGRESS NOTES
[facility]  Progress Note - Milagros George 1935, 82 y o  male MRN: [id number]  Unit/Bed#: [place] [address] -01 Encounter: [id number]  Primary Care Provider: No primary care provider on file  Date and time admitted to hospital: 7/9/2022 11:02 AM    * Cellulitis  Assessment & Plan  Presented with left leg cellulitis, surrounding wounds, and growth on leg  · Maggots were found in the wound upon presentation to the ED  · Ct lower extremity: "Extensive subcutaneous edema likely representing cellulitis  Although evaluation for abscess is limited in the absence images contrast no obvious discrete collection is identified  Dermal soft tissue lesion seen in association with the posterior medial calf "  · Completed 7 days of IV antibiotics  · Blood cultures negative thus far    Depression  Assessment & Plan  Remeron started per psych recommendations    Malignant melanoma of leg, left [facility])  Assessment & Plan  Pathology showing Melanoma  Discussed case with Oncology Farhan Staley MD   · Recommendations were: Pathology noted  F/u to be placed with Dr Brandie Patel for melanoma medical oncology care  · CT CAP without any mets  · Plastics consult per surgery recommendations for complete excision to be done outpatient  · Dermatology outpatient follow-up for skin exam to assess for other sites of potential melanoma  COVID-19  Assessment & Plan  Detected to be COVID-19 positive on 07/20/2022  Asymptomatic  Now off isolation  Hypertension  Assessment & Plan  Controlled  - Continue Amlodipine    Type 2 diabetes mellitus with hyperglycemia, without long-term current use of insulin (HCC)  Assessment & Plan  New onset diabetes mellitus  Unclear with the this was undetected and detected as inpt here or this was precipitated by COVID infection  · Hemoglobin A1c checked and is 8 8    AKUA (acute kidney injury) [facility])  Assessment & Plan  Resolved        VTE Pharmacologic Prophylaxis: Pharmacologic: Heparin  Mechanical VTE Prophylaxis in Place: Yes    Discussions with Specialists or Other Care Team Provider: nursing    Education and Discussions with Family / Patient: patientCayetano    Time Spent for Care: 30 minutes  More than 50% of total time spent on counseling and coordination of care as described above  Current Length of Stay: 23 day(s)    Current Patient Status: Inpatient   Certification Statement: The patient will continue to require additional inpatient hospital stay due to placement    Discharge Plan: placement    Code Status: Level 1 - Full Code      Subjective:   Patient seen and examined at bedside  Comfortable  No new issues overnight  Objective:     Vitals:   Temp (24hrs), Av 2 °F (36 8 °C), Min:97 5 °F (36 4 °C), Max:99 5 °F (37 5 °C)    Temp:  [97 5 °F (36 4 °C)-99 5 °F (37 5 °C)] 97 6 °F (36 4 °C)  HR:  [] 95  Resp:  [15-18] 16  BP: (144-151)/(76-79) 144/76  SpO2:  [90 %-95 %] 95 %  Body mass index is 29 79 kg/m²  Input and Output Summary (last 24 hours): Intake/Output Summary (Last 24 hours) at 2022 1054  Last data filed at 2022 0514  Gross per 24 hour   Intake --   Output 400 ml   Net -400 ml       Physical Exam:     Physical Exam  Vitals reviewed  HENT:      Head: Normocephalic  Nose: Nose normal       Mouth/Throat:      Mouth: Mucous membranes are moist    Eyes:      General: No scleral icterus  Cardiovascular:      Rate and Rhythm: Normal rate  Pulmonary:      Effort: Pulmonary effort is normal  No respiratory distress  Abdominal:      General: There is no distension  Palpations: Abdomen is soft  Tenderness: There is no abdominal tenderness  Musculoskeletal:      Comments: Left leg dressing intact   Skin:     General: Skin is warm  Neurological:      Mental Status: He is alert  Mental status is at baseline     Psychiatric:         Mood and Affect: Mood normal          Behavior: Behavior normal        Additional Data:     Labs:        Results from last 7 days   Lab Units 07/27/22  0435   SODIUM mmol/L 138   POTASSIUM mmol/L 3 5   CHLORIDE mmol/L 101   CO2 mmol/L 25   BUN mg/dL 13   CREATININE mg/dL 0 80   ANION GAP mmol/L 12   CALCIUM mg/dL 8 6   GLUCOSE RANDOM mg/dL 103         Results from last 7 days   Lab Units 07/26/22  1112 07/26/22  0734 07/25/22  2101 07/25/22  1515 07/25/22  1121   POC GLUCOSE mg/dl 129 105 104 143* 124                   * I Have Reviewed All Lab Data Listed Above  * Additional Pertinent Lab Tests Reviewed: [address] 68 Admission Reviewed      Lines:  Invasive Devices  Report    Peripheral Intravenous Line  Duration           Peripheral IV 07/30/22 Proximal;Right;Ventral (anterior) Forearm 2 days               Imaging:    Imaging Reports Reviewed Today Include: No new imaging    Recent Cultures (last 7 days):           Last 24 Hours Medication List:   Current Facility-Administered Medications   Medication Dose Route Frequency Provider Last Rate    acetaminophen  650 mg Oral Q6H PRN Vanessa Kim DO      al mag oxide-diphenhydramine-lidocaine viscous  10 mL Swish & Swallow Q6H PRN Gela Hughes MD      amLODIPine  10 mg Oral Daily Johnathan Barker MD      aspirin  81 mg Oral Daily Johnathan Barker MD      butalbital-acetaminophen-caffeine  1 tablet Oral Q4H PRN Gela Hughes MD      heparin (porcine)  5,000 Units Subcutaneous Q8H [address] Vanessa Kim DO      mirtazapine  15 mg Oral HS Malinda Cárdenas MD      oxyCODONE  2 5 mg Oral Q8H PRN Baylee Andrews, DO      potassium chloride  20 mEq Oral Daily Andria Finch MD          Today, Patient Was Seen By: Jenniffer Crespo MD    ** Please Note: Dictation voice to text software may have been used in the creation of this document   **

## 2022-08-01 NOTE — ASSESSMENT & PLAN NOTE
Pathology showing Melanoma  Discussed case with Oncology Farhan Staley MD   · Recommendations were: Pathology noted  F/u to be placed with Dr Brandie Patel for melanoma medical oncology care  · CT CAP without any mets  · Plastics consult per surgery recommendations for complete excision to be done outpatient  · Dermatology outpatient follow-up for skin exam to assess for other sites of potential melanoma

## 2022-08-01 NOTE — PLAN OF CARE
Problem: PHYSICAL THERAPY ADULT  Goal: Performs mobility at highest level of function for planned discharge setting  See evaluation for individualized goals  Note: Prognosis: Fair  Problem List: Decreased endurance, Impaired balance, Decreased mobility, Decreased cognition, Impaired judgement, Decreased safety awareness  Assessment: Janiya Stern was seen for a f/u session and to update POC as appropriate  Pt progress in therapy to date appears primarily limited by cognitive deficits / willingness to participate  Consistently functioning at S level for bed mobility, transfers and ambulation  No gross LOB observed w use of RW  Able to ambulate household distances however assessment of ambulatory endurance limited by cognition, agitation  Given baseline cognitive deficits would recommend transition to more supervised environment w PT services as appropriate to address generalized deconditioning  Barriers to Discharge: Decreased caregiver support  Barriers to Discharge Comments: lives alone  PT Discharge Recommendation:  (24/7 supervised environment)    See flowsheet documentation for full assessment

## 2022-08-01 NOTE — PLAN OF CARE
Problem: Potential for Falls  Goal: Patient will remain free of falls  Description: INTERVENTIONS:  - Educate patient/family on patient safety including physical limitations  - Instruct patient to call for assistance with activity   - Consult OT/PT to assist with strengthening/mobility   - Keep Call bell within reach  - Keep bed low and locked with side rails adjusted as appropriate  - Keep care items and personal belongings within reach  - Initiate and maintain comfort rounds  - Make Fall Risk Sign visible to staff  - Offer Toileting every 2 Hours, in advance of need  - Initiate/Maintain bed/chair alarm  - Obtain necessary fall risk management equipment:bed/chair alarm, call bell, walker, gripper socks, rounds  - Apply yellow socks and bracelet for high fall risk patients  - Consider moving patient to room near nurses station  8/1/2022 0144 by Olimpia Faith  Outcome: Progressing  8/1/2022 0140 by Olimpia Faith  Outcome: Progressing     Problem: Prexisting or High Potential for Compromised Skin Integrity  Goal: Skin integrity is maintained or improved  Description: INTERVENTIONS:  - Identify patients at risk for skin breakdown  - Assess and monitor skin integrity  - Assess and monitor nutrition and hydration status  - Monitor labs   - Assess for incontinence   - Instruct patient to turn and repostion every 2 hours while in bed and weight shift every 15 minutes while in chair  - Assist with mobility/ambulation  - Relieve pressure over bony prominences  - Avoid friction and shearing  - Provide appropriate hygiene as needed including keeping skin clean and dry  - Evaluate need for skin moisturizer/barrier cream  - Collaborate with interdisciplinary team   - Patient/family teaching  - Consider wound care consult   8/1/2022 0144 by Olimpia Faith  Outcome: Progressing  8/1/2022 0140 by Olimpia Faith  Outcome: Progressing     Problem: MOBILITY - ADULT  Goal: Maintain or return to baseline ADL function  Description: INTERVENTIONS:  -  Assess patient's ability to carry out ADLs; assess patient's baseline for ADL function and identify physical deficits which impact ability to perform ADLs (bathing, care of mouth/teeth, toileting, grooming, dressing, etc )  - Assess/evaluate cause of self-care deficits   - Assess range of motion  - Assess patient's mobility; develop plan if impaired  - Assess patient's need for assistive devices and provide as appropriate  - Encourage maximum independence but intervene and supervise when necessary  - Involve family in performance of ADLs  - Assess for home care needs following discharge   - Consider OT consult to assist with ADL evaluation and planning for discharge  - Provide patient education as appropriate  8/1/2022 0144 by Oxana Barnard  Outcome: Progressing  8/1/2022 0140 by Oxana Barnard  Outcome: Progressing  Goal: Maintains/Returns to pre admission functional level  Description: INTERVENTIONS:  - Perform BMAT or MOVE assessment daily    - Set and communicate daily mobility goal to care team and patient/family/caregiver  - Collaborate with rehabilitation services on mobility goals if consulted  - Perform Range of Motion 2 times a day  - Reposition patient every 2 hours    - Dangle patient 2 times a day  - Stand patient 2 times a day  - Ambulate patient 2 times a day  - Out of bed to chair 2 times a day   - Out of bed for meals 2 times a day  - Out of bed for toileting  - Record patient progress and toleration of activity level   8/1/2022 0144 by Oxana Barnard  Outcome: Progressing  8/1/2022 0140 by Oxana Barnard  Outcome: Progressing     Problem: INFECTION - ADULT  Goal: Absence or prevention of progression during hospitalization  Description: INTERVENTIONS:  - Assess and monitor for signs and symptoms of infection  - Monitor lab/diagnostic results  - Monitor all insertion sites, i e  indwelling lines, tubes, and drains  - Monitor endotracheal if appropriate and nasal secretions for changes in amount and color  - [place] appropriate cooling/warming therapies per order  - Administer medications as ordered  - Instruct and encourage patient and family to use good hand hygiene technique  - Identify and instruct in appropriate isolation precautions for identified infection/condition  8/1/2022 0144 by Cesario Nagy  Outcome: Progressing  8/1/2022 0140 by Cesario Nagy  Outcome: Progressing     Problem: DISCHARGE PLANNING  Goal: Discharge to home or other facility with appropriate resources  Description: INTERVENTIONS:  - Identify barriers to discharge w/patient and caregiver  - Arrange for needed discharge resources and transportation as appropriate  - Identify discharge learning needs (meds, wound care, etc )  - Arrange for interpretive services to assist at discharge as needed  - Refer to Case Management Department for coordinating discharge planning if the patient needs post-hospital services based on physician/advanced practitioner order or complex needs related to functional status, cognitive ability, or social support system  8/1/2022 0144 by Cesario Nagy  Outcome: Progressing  8/1/2022 0140 by Cesario Nagy  Outcome: Progressing     Problem: SKIN/TISSUE INTEGRITY - ADULT  Goal: Incision(s), wounds(s) or drain site(s) healing without S/S of infection  Description: INTERVENTIONS  - Assess and document dressing, incision, wound bed, drain sites and surrounding tissue  - Provide patient and family education  - Perform skin care/dressing changes every day  8/1/2022 0144 by Cesario Nagy  Outcome: Progressing  8/1/2022 0140 by Cesario Nagy  Outcome: Progressing  Goal: Skin Integrity remains intact(Skin Breakdown Prevention)  Description: Assess:  -Perform Joseph assessment every 2  -Clean and moisturize skin every 2  -Inspect skin when repositioning, toileting, and assisting with ADLS  -Assess extremities for adequate circulation and sensation     Bed Management:  -Have minimal linens on bed & keep smooth, unwrinkled  -Change linens as needed when moist or perspiring  -Avoid sitting or lying in one position for more than 2 hours while in bed  -Keep HOB at 30 degrees     Toileting:  -Offer bedside commode  -Assess for incontinence every 2  -Use incontinent care products after each incontinent episode  Activity:  -Mobilize patient 2 times a day  -Encourage activity and walks on unit  -Encourage or provide ROM exercises   -Turn and reposition patient every 2 Hours  -Use appropriate equipment to lift or move patient in bed  -Instruct/ Assist with weight shifting every  2 when out of bed in chair  -Consider limitation of chair time 2 hour intervals    Skin Care:  -Avoid use of baby powder, tape, friction and shearing, hot water or constrictive clothing  -Relieve pressure over bony prominences   -Do not massage red bony areas    Next Steps:  -Teach patient strategies to minimize risks  -Consider consults to  interdisciplinary teams    8/1/2022 0144 by Kimberly Russo  Outcome: Progressing  8/1/2022 0140 by Kimberly Russo  Outcome: Progressing     Problem: PAIN - ADULT  Goal: Verbalizes/displays adequate comfort level or baseline comfort level  Description: Interventions:  - Encourage patient to monitor pain and request assistance  - Assess pain using appropriate pain scale  - Administer analgesics based on type and severity of pain and evaluate response  - Implement non-pharmacological measures as appropriate and evaluate response  - Consider cultural and social influences on pain and pain management  - Notify physician/advanced practitioner if interventions unsuccessful or patient reports new pain  8/1/2022 0144 by Kimberly Russo  Outcome: Progressing  8/1/2022 0140 by Kimberly Russo  Outcome: Progressing     Problem: CONFUSION/THOUGHT DISTURBANCE  Goal: Thought disturbances (confusion, delirium, depression, dementia or psychosis) are managed to maintain or return to baseline mental status and functional level  Description: INTERVENTIONS:  - Assess for possible contributors to  thought disturbance, including but not limited to medications, infection, impaired vision or hearing, underlying metabolic abnormalities, dehydration, respiratory compromise,  psychiatric diagnoses and notify attending PHYSICAN/AP  - Monitor and intervene to maintain adequate nutrition, hydration, elimination, sleep and activity  - Decrease environmental stimuli, including noise as appropriate  - Provide frequent contacts to provide refocusing, direction and reassurance as needed  Approach patient calmly with eye contact and at their level    - [place] high risk fall precautions, aspiration precautions and other safety measures, as indicated  - If delirium suspected, notify physician/AP of change in condition and request immediate in-person evaluation  - Pursue consults as appropriate including Geriatric (campus dependent), OT for cognitive evaluation/activity planning, psychiatric, pastoral care, etc   8/1/2022 0144 by Mckenzie Smith  Outcome: Progressing  8/1/2022 0140 by Mckenzie Smith  Outcome: Progressing     Problem: GASTROINTESTINAL - ADULT  Goal: Maintains adequate nutritional intake  Description: INTERVENTIONS:  - Monitor percentage of each meal consumed  - Identify factors contributing to decreased intake, treat as appropriate  - Assist with meals as needed  - Monitor I&O, weight, and lab values if indicated  - Obtain nutrition services referral as needed  8/1/2022 0144 by Mckenzie Smith  Outcome: Progressing  8/1/2022 0140 by Mckenzie Smith  Outcome: Progressing     Problem: METABOLIC, FLUID AND ELECTROLYTES - ADULT  Goal: Electrolytes maintained within normal limits  Description: INTERVENTIONS:  - Monitor labs and assess patient for signs and symptoms of electrolyte imbalances  - Administer electrolyte replacement as ordered  - Monitor response to electrolyte replacements, including repeat lab results as appropriate  - Instruct patient on fluid and nutrition as appropriate  8/1/2022 0144 by Sandrita Song  Outcome: Progressing  8/1/2022 0140 by Sandrita Song  Outcome: Progressing  Goal: Glucose maintained within target range  Description: INTERVENTIONS:  - Monitor Blood Glucose as ordered  - Assess for signs and symptoms of hyperglycemia and hypoglycemia  - Administer ordered medications to maintain glucose within target range  - Assess nutritional intake and initiate nutrition service referral as needed  8/1/2022 0144 by Sandrita Song  Outcome: Progressing  8/1/2022 0140 by Sandrita Song  Outcome: Progressing     Problem: Nutrition/Hydration-ADULT  Goal: Nutrient/Hydration intake appropriate for improving, restoring or maintaining nutritional needs  Description: Monitor and assess patient's nutrition/hydration status for malnutrition  Collaborate with interdisciplinary team and initiate plan and interventions as ordered  Monitor patient's weight and dietary intake as ordered or per policy  Utilize nutrition screening tool and intervene as necessary  Determine patient's food preferences and provide high-protein, high-caloric foods as appropriate       INTERVENTIONS:  - Monitor oral intake, urinary output, labs, and treatment plans  - Assess nutrition and hydration status and recommend course of action  - Evaluate amount of meals eaten  - Assist patient with eating if necessary   - Allow adequate time for meals  - Recommend/ encourage appropriate diets, oral nutritional supplements, and vitamin/mineral supplements  - Order, calculate, and assess calorie counts as needed  - Recommend, monitor, and adjust tube feedings and TPN/PPN based on assessed needs  - Assess need for intravenous fluids  - Provide specific nutrition/hydration education as appropriate  - Include patient/family/caregiver in decisions related to nutrition  8/1/2022 0144 by Sandrita Song  Outcome: Progressing  8/1/2022 0140 by Sarah Olmos Strong  Outcome: Progressing

## 2022-08-01 NOTE — PLAN OF CARE
Problem: Potential for Falls  Goal: Patient will remain free of falls  Description: INTERVENTIONS:  - Educate patient/family on patient safety including physical limitations  - Instruct patient to call for assistance with activity   - Consult OT/PT to assist with strengthening/mobility   - Keep Call bell within reach  - Keep bed low and locked with side rails adjusted as appropriate  - Keep care items and personal belongings within reach  - Initiate and maintain comfort rounds  - Make Fall Risk Sign visible to staff  - Offer Toileting every 2 Hours, in advance of need  - Initiate/Maintain bed/chair alarm  - Obtain necessary fall risk management equipment:bed/chair alarm, call bell, walker, gripper socks, rounds  - Apply yellow socks and bracelet for high fall risk patients  - Consider moving patient to room near nurses station  Outcome: Progressing     Problem: Prexisting or High Potential for Compromised Skin Integrity  Goal: Skin integrity is maintained or improved  Description: INTERVENTIONS:  - Identify patients at risk for skin breakdown  - Assess and monitor skin integrity  - Assess and monitor nutrition and hydration status  - Monitor labs   - Assess for incontinence   - Instruct patient to turn and repostion every 2 hours while in bed and weight shift every 15 minutes while in chair  - Assist with mobility/ambulation  - Relieve pressure over bony prominences  - Avoid friction and shearing  - Provide appropriate hygiene as needed including keeping skin clean and dry  - Evaluate need for skin moisturizer/barrier cream  - Collaborate with interdisciplinary team   - Patient/family teaching  - Consider wound care consult   Outcome: Progressing     Problem: MOBILITY - ADULT  Goal: Maintain or return to baseline ADL function  Description: INTERVENTIONS:  -  Assess patient's ability to carry out ADLs; assess patient's baseline for ADL function and identify physical deficits which impact ability to perform ADLs (bathing, care of mouth/teeth, toileting, grooming, dressing, etc )  - Assess/evaluate cause of self-care deficits   - Assess range of motion  - Assess patient's mobility; develop plan if impaired  - Assess patient's need for assistive devices and provide as appropriate  - Encourage maximum independence but intervene and supervise when necessary  - Involve family in performance of ADLs  - Assess for home care needs following discharge   - Consider OT consult to assist with ADL evaluation and planning for discharge  - Provide patient education as appropriate  Outcome: Progressing  Goal: Maintains/Returns to pre admission functional level  Description: INTERVENTIONS:  - Perform BMAT or MOVE assessment daily    - Set and communicate daily mobility goal to care team and patient/family/caregiver  - Collaborate with rehabilitation services on mobility goals if consulted  - Perform Range of Motion  times a day  - Reposition patient every  hours    - Dangle patient  times a day  - Stand patient  times a day  - Ambulate patient  times a day  - Out of bed to chair  times a day   - Out of bed for meals  times a day  - Out of bed for toileting  - Record patient progress and toleration of activity level   Outcome: Progressing     Problem: INFECTION - ADULT  Goal: Absence or prevention of progression during hospitalization  Description: INTERVENTIONS:  - Assess and monitor for signs and symptoms of infection  - Monitor lab/diagnostic results  - Monitor all insertion sites, i e  indwelling lines, tubes, and drains  - Monitor endotracheal if appropriate and nasal secretions for changes in amount and color  - [place] appropriate cooling/warming therapies per order  - Administer medications as ordered  - Instruct and encourage patient and family to use good hand hygiene technique  - Identify and instruct in appropriate isolation precautions for identified infection/condition  Outcome: Progressing     Problem: DISCHARGE PLANNING  Goal: Discharge to home or other facility with appropriate resources  Description: INTERVENTIONS:  - Identify barriers to discharge w/patient and caregiver  - Arrange for needed discharge resources and transportation as appropriate  - Identify discharge learning needs (meds, wound care, etc )  - Arrange for interpretive services to assist at discharge as needed  - Refer to Case Management Department for coordinating discharge planning if the patient needs post-hospital services based on physician/advanced practitioner order or complex needs related to functional status, cognitive ability, or social support system  Outcome: Progressing     Problem: SKIN/TISSUE INTEGRITY - ADULT  Goal: Incision(s), wounds(s) or drain site(s) healing without S/S of infection  Description: INTERVENTIONS  - Assess and document dressing, incision, wound bed, drain sites and surrounding tissue  - Provide patient and family education  - Perform skin care/dressing changes every   Outcome: Progressing  Goal: Skin Integrity remains intact(Skin Breakdown Prevention)  Description: Assess:  -Perform Joseph assessment every  -Clean and moisturize skin every   -Inspect skin when repositioning, toileting, and assisting with ADLS  -Assess under medical devices such as  every   -Assess extremities for adequate circulation and sensation     Bed Management:  -Have minimal linens on bed & keep smooth, unwrinkled  -Change linens as needed when moist or perspiring  -Avoid sitting or lying in one position for more than hours while in bed  -Keep HOB at degrees     Toileting:  -Offer bedside commode  -Assess for incontinence every   -Use incontinent care products after each incontinent episode such as     Activity:  -Mobilize patient times a day  -Encourage activity and walks on unit  -Encourage or provide ROM exercises   -Turn and reposition patient every  Hours  -Use appropriate equipment to lift or move patient in bed  -Instruct/ Assist with weight shifting every  when out of bed in chair  -Consider limitation of chair time  hour intervals    Skin Care:  -Avoid use of baby powder, tape, friction and shearing, hot water or constrictive clothing  -Relieve pressure over bony prominences using   -Do not massage red bony areas    Next Steps:  -Teach patient strategies to minimize risks such as    -Consider consults to  interdisciplinary teams such as   Outcome: Progressing     Problem: PAIN - ADULT  Goal: Verbalizes/displays adequate comfort level or baseline comfort level  Description: Interventions:  - Encourage patient to monitor pain and request assistance  - Assess pain using appropriate pain scale  - Administer analgesics based on type and severity of pain and evaluate response  - Implement non-pharmacological measures as appropriate and evaluate response  - Consider cultural and social influences on pain and pain management  - Notify physician/advanced practitioner if interventions unsuccessful or patient reports new pain  Outcome: Progressing     Problem: CONFUSION/THOUGHT DISTURBANCE  Goal: Thought disturbances (confusion, delirium, depression, dementia or psychosis) are managed to maintain or return to baseline mental status and functional level  Description: INTERVENTIONS:  - Assess for possible contributors to  thought disturbance, including but not limited to medications, infection, impaired vision or hearing, underlying metabolic abnormalities, dehydration, respiratory compromise,  psychiatric diagnoses and notify attending PHYSICAN/AP  - Monitor and intervene to maintain adequate nutrition, hydration, elimination, sleep and activity  - Decrease environmental stimuli, including noise as appropriate  - Provide frequent contacts to provide refocusing, direction and reassurance as needed  Approach patient calmly with eye contact and at their level    - [place] high risk fall precautions, aspiration precautions and other safety measures, as indicated  - If delirium suspected, notify physician/AP of change in condition and request immediate in-person evaluation  - Pursue consults as appropriate including Geriatric (campus dependent), OT for cognitive evaluation/activity planning, psychiatric, pastoral care, etc   Outcome: Progressing     Problem: GASTROINTESTINAL - ADULT  Goal: Maintains adequate nutritional intake  Description: INTERVENTIONS:  - Monitor percentage of each meal consumed  - Identify factors contributing to decreased intake, treat as appropriate  - Assist with meals as needed  - Monitor I&O, weight, and lab values if indicated  - Obtain nutrition services referral as needed  Outcome: Progressing     Problem: METABOLIC, FLUID AND ELECTROLYTES - ADULT  Goal: Electrolytes maintained within normal limits  Description: INTERVENTIONS:  - Monitor labs and assess patient for signs and symptoms of electrolyte imbalances  - Administer electrolyte replacement as ordered  - Monitor response to electrolyte replacements, including repeat lab results as appropriate  - Instruct patient on fluid and nutrition as appropriate  Outcome: Progressing  Goal: Glucose maintained within target range  Description: INTERVENTIONS:  - Monitor Blood Glucose as ordered  - Assess for signs and symptoms of hyperglycemia and hypoglycemia  - Administer ordered medications to maintain glucose within target range  - Assess nutritional intake and initiate nutrition service referral as needed  Outcome: Progressing     Problem: Nutrition/Hydration-ADULT  Goal: Nutrient/Hydration intake appropriate for improving, restoring or maintaining nutritional needs  Description: Monitor and assess patient's nutrition/hydration status for malnutrition  Collaborate with interdisciplinary team and initiate plan and interventions as ordered  Monitor patient's weight and dietary intake as ordered or per policy  Utilize nutrition screening tool and intervene as necessary   Determine patient's food preferences and provide high-protein, high-caloric foods as appropriate       INTERVENTIONS:  - Monitor oral intake, urinary output, labs, and treatment plans  - Assess nutrition and hydration status and recommend course of action  - Evaluate amount of meals eaten  - Assist patient with eating if necessary   - Allow adequate time for meals  - Recommend/ encourage appropriate diets, oral nutritional supplements, and vitamin/mineral supplements  - Order, calculate, and assess calorie counts as needed  - Recommend, monitor, and adjust tube feedings and TPN/PPN based on assessed needs  - Assess need for intravenous fluids  - Provide specific nutrition/hydration education as appropriate  - Include patient/family/caregiver in decisions related to nutrition  Outcome: Progressing

## 2022-08-02 PROBLEM — N17.9 AKI (ACUTE KIDNEY INJURY) (HCC): Status: RESOLVED | Noted: 2022-01-01 | Resolved: 2022-01-01

## 2022-08-02 PROCEDURE — 99232 SBSQ HOSP IP/OBS MODERATE 35: CPT | Performed by: INTERNAL MEDICINE

## 2022-08-02 PROCEDURE — 97535 SELF CARE MNGMENT TRAINING: CPT

## 2022-08-02 RX ADMIN — HEPARIN SODIUM 5000 UNITS: 5000 INJECTION INTRAVENOUS; SUBCUTANEOUS at 05:43

## 2022-08-02 RX ADMIN — ASPIRIN 81 MG: 81 TABLET, COATED ORAL at 08:46

## 2022-08-02 RX ADMIN — AMLODIPINE BESYLATE 10 MG: 10 TABLET ORAL at 08:46

## 2022-08-02 RX ADMIN — MIRTAZAPINE 15 MG: 15 TABLET, FILM COATED ORAL at 21:40

## 2022-08-02 RX ADMIN — HEPARIN SODIUM 5000 UNITS: 5000 INJECTION INTRAVENOUS; SUBCUTANEOUS at 21:40

## 2022-08-02 RX ADMIN — HEPARIN SODIUM 5000 UNITS: 5000 INJECTION INTRAVENOUS; SUBCUTANEOUS at 13:50

## 2022-08-02 RX ADMIN — POTASSIUM CHLORIDE 20 MEQ: 1500 TABLET, EXTENDED RELEASE ORAL at 08:46

## 2022-08-02 NOTE — TELEPHONE ENCOUNTER
Still in-patient  Final Diagnosis   A  Skin, left lower leg, incisional biopsy:  Melanoma, ulcerated  See synoptic report and note  Note:  The tumor extends to the tissue edges  Pathologic stage (pTNM, AJCC 8th edition) is at least pT3b        Blaise Cárdenas MD has been notified of this diagnosis on 07/25/22 at 03:45 pm

## 2022-08-02 NOTE — PROGRESS NOTES
[address] [facility]  Progress Note - Monico Weems 1935, 82 y o  male MRN: [id number]  Unit/Bed#: [place] [address] -01 Encounter: [id number]  Primary Care Provider: No primary care provider on file  Date and time admitted to hospital: 7/9/2022 11:02 AM    * Cellulitis  Assessment & Plan  Presented with left leg cellulitis, surrounding wounds, and growth on leg  · Maggots were found in the wound upon presentation to the ED  · Ct lower extremity: "Extensive subcutaneous edema likely representing cellulitis  Although evaluation for abscess is limited in the absence images contrast no obvious discrete collection is identified  Dermal soft tissue lesion seen in association with the posterior medial calf "  · Completed 7 days of IV antibiotics  · Blood cultures negative thus far    Depression  Assessment & Plan  Remeron started per psych recommendations    Malignant melanoma of leg, left [facility])  Assessment & Plan  Pathology showing Melanoma  Discussed case with Oncology Jeanine Ansari MD   · Recommendations were: Pathology noted  F/u to be placed with Dr Verduzco Stage for melanoma medical oncology care  · CT CAP without any mets  · Plastics consult per surgery recommendations for complete excision to be done outpatient  · Dermatology outpatient follow-up for skin exam to assess for other sites of potential melanoma  COVID-19  Assessment & Plan  Detected to be COVID-19 positive on 07/20/2022  Asymptomatic  Now off isolation  Encounter for competency evaluation  Assessment & Plan  · Neuropsychiatry consultation appreciated, at this point patient does not have capacity to make informed medical decisions    Hypertension  Assessment & Plan  Controlled  - Continue Amlodipine    Type 2 diabetes mellitus with hyperglycemia, without long-term current use of insulin (HCC)  Assessment & Plan  New onset diabetes mellitus   Unclear with the this was undetected and detected as inpt here or this was precipitated by COVID infection  · Hemoglobin A1c checked and is 8 8    Leg mass  Assessment & Plan  · Seen and evaluated by General surgery  · Appears mass has been there for quite some time  · Status post bedside incisional biopsy 07/13/2022  · Pathology pending  Definitive treatment pending biopsy results  · Pain control    AKUA (acute kidney injury) (HCC)-resolved as of 8/2/2022  Assessment & Plan  Resolved  [facility] Progress Note  Patient: Mayur Luciano 82 y o  male   MRN: [id number]  PCP: No primary care provider on file  Unit/Bed#: [place] [address] -01 Encounter: [id number]  Date Of Visit: 08/02/22    Assessment:    Principal Problem:    Cellulitis  Active Problems:    Leg mass    Type 2 diabetes mellitus with hyperglycemia, without long-term current use of insulin ([facility] [address] [address] )    Hypertension    Encounter for competency evaluation    COVID-19    Malignant melanoma of leg, left ([facility] [address] )    Depression      Plan:    · Continue current medical managed  · Discharge planning  · Medically stable for discharge when bed is available       VTE Pharmacologic Prophylaxis:   Pharmacologic: Heparin  Mechanical VTE Prophylaxis in Place: Yes    Patient Centered Rounds: I have performed bedside rounds with nursing staff today  Discussions with Specialists or Other Care Team Provider:      Education and Discussions with Family / Patient:  No clinical update, patient was updated by previous provider    Time Spent for Care: 30 minutes  More than 50% of total time spent on counseling and coordination of care as described above      Current Length of Stay: 24 day(s)    Current Patient Status: Inpatient   Certification Statement: The patient will continue to require additional inpatient hospital stay due to Placement    Discharge Plan / Estimated Discharge Date:  Tomorrow    Code Status: Level 1 - Full Code      Subjective:   Seen examined, no acute complaints    A complete and comprehensive 14 point organ system review has been performed and all other systems are negative other than stated above  Objective:     Vitals:   Temp (24hrs), Av 9 °F (36 6 °C), Min:97 7 °F (36 5 °C), Max:98 3 °F (36 8 °C)    Temp:  [97 7 °F (36 5 °C)-98 3 °F (36 8 °C)] 97 7 °F (36 5 °C)  HR:  [] 100  Resp:  [18-19] 19  BP: (139-143)/(76-77) 141/76  SpO2:  [94 %-95 %] 95 %  Body mass index is 29 79 kg/m²  Input and Output Summary (last 24 hours): Intake/Output Summary (Last 24 hours) at 2022 1740  Last data filed at 2022 0949  Gross per 24 hour   Intake --   Output 350 ml   Net -350 ml       Physical Exam:     General: well appearing, no acute distress  HEENT: atraumatic, PERRLA, moist mucosa, normal pharynx, normal tonsils and adenoids, normal tongue, no fluid in sinuses  Neck: Trachea midline, no carotid bruit, no masses  Respiratory: normal chest wall expansion, CTA B, no r/r/w, no rubs  Cardiovascular: RRR, no m/r/g, Normal S1 and S2  Abdomen: Soft, non-tender, non-distended, normal bowel sounds in all quadrants, no hepatosplenomegaly, no tympany  Rectal: deferred  Musculoskeletal: normal ROM in upper and lower extremities  Integumentary:  Dressing clear dry and intact  Heme/Lymph: no lymphadenopathy, no bruises  Neurological: Cranial Nerves II-XII grossly intact, no tics, normal sensation to pressure and light touch  Psychiatric: cooperative with normal mood, affect, and cognition      Additional Data:     Labs:        Results from last 7 days   Lab Units 22  0435   POTASSIUM mmol/L 3 5   CHLORIDE mmol/L 101   CO2 mmol/L 25   BUN mg/dL 13   CREATININE mg/dL 0 80   CALCIUM mg/dL 8 6           * I Have Reviewed All Lab Data Listed Above  * Additional Pertinent Lab Tests Reviewed:  David 66 Admission Reviewed    Imaging:    Imaging Reports Reviewed Today Include:  No new imaging  Imaging Personally Reviewed by Myself Includes:  No new imaging    Recent Cultures (last 7 days): Last 24 Hours Medication List:   Current Facility-Administered Medications   Medication Dose Route Frequency Provider Last Rate    acetaminophen  650 mg Oral Q6H PRN Vanessa Kim DO      al mag oxide-diphenhydramine-lidocaine viscous  10 mL Swish & Swallow Q6H PRN Evan Granados MD      amLODIPine  10 mg Oral Daily Frida Guerrero MD      aspirin  81 mg Oral Daily Frida Guerrero MD      butalbital-acetaminophen-caffeine  1 tablet Oral Q4H PRN Evan Granados MD      heparin (porcine)  5,000 Units Subcutaneous [facility] Vanessa Kim DO      mirtazapine  15 mg Oral HS Mckayla Mathis MD      oxyCODONE  2 5 mg Oral Q8H PRN Zeina Rehman DO      potassium chloride  20 mEq Oral Daily Mariellen Moritz, MD          Today, Patient Was Seen By: Hilary Lr DO    ** Please Note: This note was completed in part utilizing [employer] Direct Software  Grammatical errors, random word insertions, spelling mistakes, and incomplete sentences may be an occasional consequence of this system secondary to software limitations, ambient noise, and hardware issues  If you have any questions or concerns about the content, text, or information contained within the body of this dictation, please contact the provider for clarification   **

## 2022-08-02 NOTE — PLAN OF CARE
Problem: OCCUPATIONAL THERAPY ADULT  Goal: Performs self-care activities at highest level of function for planned discharge setting  See evaluation for individualized goals  Description: Treatment Interventions: ADL retraining, Functional transfer training, Endurance training, Patient/family training, Equipment evaluation/education          See flowsheet documentation for full assessment, interventions and recommendations  Outcome: Progressing  Note: Limitation: Decreased ADL status, Decreased UE strength, Decreased Safe judgement during ADL, Decreased cognition, Decreased endurance, Decreased high-level ADLs  Prognosis: Fair  Assessment: Pt seen this date for skilled OT session focused on ADLs, functional transfers and mobility, safety education  The patient was received supine in bed, NAD, PIV access, on RA  He participated in functional bed mobility, functional ambulation in room, toileting and self feeding ADLs this date  Pt was Minimal Assistance for all mobility and Moderate Assistance for toileting ADL  At end of session the patient was located seated in bed side chair with call bell in reach and all needs met  Overall the patient remains below his functional baseline, and is primarily limited at this time due to decreased activity tolerance, impaired balance, impaired cognition, and generalized deconditioning  OT will continue to follow while acute to address POC   At this time, recommend inpatient rehab upon d/c      OT Discharge Recommendation: Post acute rehabilitation services        Amita Blevins OTR/L

## 2022-08-02 NOTE — OCCUPATIONAL THERAPY NOTE
Occupational Therapy Progress Note     Patient Name: Sriram Moore  HMTOH'Q Date: 8/2/2022    OT Treatment Time:  7892-6177    Problem List  Principal Problem:    Cellulitis  Active Problems:    AKUA (acute kidney injury) ([facility] [address] )    Leg mass    Type 2 diabetes mellitus with hyperglycemia, without long-term current use of insulin ([facility] [address] )    Hypertension    Encounter for competency evaluation    COVID-19    Malignant melanoma of leg, left ([facility] [address] )    Depression        08/02/22 1208   OT Last Visit   OT Visit Date 08/02/22   Note Type   Note Type Treatment   Restrictions/Precautions   Weight Bearing Precautions Per Order No   Other Precautions Cognitive; Chair Alarm; Bed Alarm; Fall Risk   Pain Assessment   Pain Assessment Tool 0-10   Pain Score No Pain   ADL   Eating Assistance 7  Independent   Eating Deficit Setup   Grooming Assistance 4  Minimal Assistance   Grooming Deficit Setup;Supervision/safety; Increased time to complete;Standing with assistive device  (at sink level)   UB Dressing Assistance 4  Minimal Assistance   UB Dressing Deficit Setup;Supervision/safety; Increased time to complete   LB Dressing Assistance 2  Maximal Assistance   Toileting Assistance  3  Moderate Assistance   Toileting Deficit Perineal hygiene  (assist for thoroughness)   Functional Standing Tolerance   Time ~3 minutes   Activity functional ambulation in room   Comments with RW   Bed Mobility   Supine to Sit 4  Minimal assistance   Additional items Assist x 1;HOB elevated; Bedrails; Increased time required;Verbal cues   Transfers   Sit to Stand 4  Minimal assistance   Additional items Assist x 1; Increased time required;Verbal cues  (with RW)   Stand to Sit 4  Minimal assistance   Additional items Assist x 1; Increased time required;Armrests; Verbal cues  (poor eccentric control)   Stand pivot 4  Minimal assistance   Additional items Assist x 1  (with RW)   Toilet transfer 4  Minimal assistance   Additional items Assist x 1;Standard toilet  (grab bar)   Functional Mobility   Functional Mobility 4  Minimal assistance   Additional Comments with RW   Cognition   Overall Cognitive Status Impaired   Arousal/Participation Alert; Cooperative   Attention Attends with cues to redirect   Orientation Level Oriented to person;Oriented to place  (grossly to situation  To August)   Memory Decreased recall of recent events;Decreased recall of precautions;Decreased short term memory   Following Commands Follows one step commands without difficulty   Comments Pt pleasant and agreeable this date   Activity Tolerance   Activity Tolerance Patient limited by fatigue   Medical Staff Made Aware Francis RN   Assessment   Assessment Pt seen this date for skilled OT session focused on ADLs, functional transfers and mobility, safety education  The patient was received supine in bed, NAD, PIV access, on RA  He participated in functional bed mobility, functional ambulation in room, toileting and self feeding ADLs this date  Pt was Minimal Assistance for all mobility and Moderate Assistance for toileting ADL  At end of session the patient was located seated in bed side chair with call bell in reach and all needs met  Overall the patient remains below his functional baseline, and is primarily limited at this time due to decreased activity tolerance, impaired balance, impaired cognition, and generalized deconditioning  OT will continue to follow while acute to address POC  At this time, recommend inpatient rehab upon d/c    Plan   Treatment Interventions ADL retraining;Functional transfer training; Endurance training;Patient/family training;Equipment evaluation/education   Goal Expiration Date 08/09/22   OT Treatment Day 3   OT Frequency 2-3x/wk   Recommendation   OT Discharge Recommendation Post acute rehabilitation services   Additional Comments  The patient's raw score on the AM-PAC Daily Activity inpatient short form is 17, standardized score is 37 26, less than 39 4   Patients at this level are likely to benefit from discharge to post-acute rehabilitation services  Please refer to the recommendation of the Occupational Therapist for safe discharge planning     AM-PAC Daily Activity Inpatient   Lower Body Dressing 2   Bathing 3   Toileting 2   Upper Body Dressing 3   Grooming 3   Eating 4   Daily Activity Raw Score 17   Daily Activity Standardized Score (Calc for Raw Score >=11) 37 26   AM-PAC Applied Cognition Inpatient   Following a Speech/Presentation 3   Understanding Ordinary Conversation 3   Taking Medications 2   Remembering Where Things Are Placed or Put Away 3   Remembering List of 4-5 Errands 2   Taking Care of Complicated Tasks 2   Applied Cognition Raw Score 15   Applied Cognition Standardized Score 33 54       Phyllis Medina, OTR/L

## 2022-08-02 NOTE — CASE MANAGEMENT
Case Management Discharge Planning Note    Patient name Marily Jackson  Location [facility] [facility] [place]* MRN [id number]  : 1935 Date 2022       Current Admission Date: 2022  Current Admission Diagnosis:Cellulitis   Patient Active Problem List    Diagnosis Date Noted    Depression 2022    COVID-19 2022    Malignant melanoma of leg, left ([facility] [address] ) 2022    Encounter for competency evaluation 2022    Hypertension 2022    AKUA (acute kidney injury) ([facility] [address] ) 2022    Cellulitis 2022    Leg mass 2022    Type 2 diabetes mellitus with hyperglycemia, without long-term current use of insulin ([email or web address] ) 2022      LOS (days): 24  Geometric Mean LOS (GMLOS) (days): 4 60  Days to GMLOS:-19 3     OBJECTIVE:  Risk of Unplanned Readmission Score: 12 7         Current admission status: Inpatient   Preferred Pharmacy:   [id number] [place] [address]  [facility] [address]  Phone: [phone] Fax: [phone]    Primary Care Provider: No primary care provider on file  Primary Insurance: MEDICARE  Secondary Insurance: [address]    DISCHARGE DETAILS:    Discharge planning discussed with[de-identified] pt's daughter  Freedom of Choice: Yes  Comments - Freedom of Choice: Called to update on facility [facility] who had previously accepted pt discussing current hold situation- agreeable to further referrals to [address], [address] [place] [address] and [facility] however wishing for pt to be held for [facility] if those facilities unable to accept (to date/time they are unable to)  Per [facility] possible open to admissions on thursday  Will follow up with same    CM contacted family/caregiver?: Yes             Contacts  Patient Contacts: Jose Richey  Relationship to Patient[de-identified] Family  Contact Method: Phone  Phone Number: [phone]  Reason/Outcome: Referral, Discharge Planning

## 2022-08-02 NOTE — ASSESSMENT & PLAN NOTE
Pathology showing Melanoma  Discussed case with Oncology Pat Linares MD   · Recommendations were: Pathology noted  F/u to be placed with Dr Mercedez Mancini for melanoma medical oncology care  · CT CAP without any mets  · Plastics consult per surgery recommendations for complete excision to be done outpatient  · Dermatology outpatient follow-up for skin exam to assess for other sites of potential melanoma

## 2022-08-03 PROCEDURE — 99232 SBSQ HOSP IP/OBS MODERATE 35: CPT | Performed by: INTERNAL MEDICINE

## 2022-08-03 RX ADMIN — HEPARIN SODIUM 5000 UNITS: 5000 INJECTION INTRAVENOUS; SUBCUTANEOUS at 22:48

## 2022-08-03 RX ADMIN — POTASSIUM CHLORIDE 20 MEQ: 1500 TABLET, EXTENDED RELEASE ORAL at 08:34

## 2022-08-03 RX ADMIN — MIRTAZAPINE 15 MG: 15 TABLET, FILM COATED ORAL at 22:47

## 2022-08-03 RX ADMIN — ASPIRIN 81 MG: 81 TABLET, COATED ORAL at 08:33

## 2022-08-03 RX ADMIN — AMLODIPINE BESYLATE 10 MG: 10 TABLET ORAL at 08:34

## 2022-08-03 RX ADMIN — HEPARIN SODIUM 5000 UNITS: 5000 INJECTION INTRAVENOUS; SUBCUTANEOUS at 05:03

## 2022-08-03 RX ADMIN — HEPARIN SODIUM 5000 UNITS: 5000 INJECTION INTRAVENOUS; SUBCUTANEOUS at 13:57

## 2022-08-03 NOTE — PLAN OF CARE
Problem: Potential for Falls  Goal: Patient will remain free of falls  Description: INTERVENTIONS:  - Educate patient/family on patient safety including physical limitations  - Instruct patient to call for assistance with activity   - Consult OT/PT to assist with strengthening/mobility   - Keep Call bell within reach  - Keep bed low and locked with side rails adjusted as appropriate  - Keep care items and personal belongings within reach  - Initiate and maintain comfort rounds  - Make Fall Risk Sign visible to staff  - Offer Toileting every 2 Hours, in advance of need  - Initiate/Maintain bed/chair alarm  - Obtain necessary fall risk management equipment:bed/chair alarm, call bell, walker, gripper socks, rounds  - Apply yellow socks and bracelet for high fall risk patients  - Consider moving patient to room near nurses station  8/3/2022 0810 by Sara Everett RN  Outcome: Progressing  8/3/2022 0808 by Sara Everett RN  Outcome: Progressing     Problem: Prexisting or High Potential for Compromised Skin Integrity  Goal: Skin integrity is maintained or improved  Description: INTERVENTIONS:  - Identify patients at risk for skin breakdown  - Assess and monitor skin integrity  - Assess and monitor nutrition and hydration status  - Monitor labs   - Assess for incontinence   - Instruct patient to turn and repostion every 2 hours while in bed and weight shift every 15 minutes while in chair  - Assist with mobility/ambulation  - Relieve pressure over bony prominences  - Avoid friction and shearing  - Provide appropriate hygiene as needed including keeping skin clean and dry  - Evaluate need for skin moisturizer/barrier cream  - Collaborate with interdisciplinary team   - Patient/family teaching  - Consider wound care consult   8/3/2022 0810 by Sara Everett RN  Outcome: Progressing  8/3/2022 0808 by Sara Everett RN  Outcome: Progressing     Problem: MOBILITY - ADULT  Goal: Maintain or return to baseline ADL function  Description: INTERVENTIONS:  -  Assess patient's ability to carry out ADLs; assess patient's baseline for ADL function and identify physical deficits which impact ability to perform ADLs (bathing, care of mouth/teeth, toileting, grooming, dressing, etc )  - Assess/evaluate cause of self-care deficits   - Assess range of motion  - Assess patient's mobility; develop plan if impaired  - Assess patient's need for assistive devices and provide as appropriate  - Encourage maximum independence but intervene and supervise when necessary  - Involve family in performance of ADLs  - Assess for home care needs following discharge   - Consider OT consult to assist with ADL evaluation and planning for discharge  - Provide patient education as appropriate  8/3/2022 0810 by Reanna Packer RN  Outcome: Progressing  8/3/2022 0808 by Reanna Packer RN  Outcome: Progressing  Goal: Maintains/Returns to pre admission functional level  Description: INTERVENTIONS:  - Perform BMAT or MOVE assessment daily    - Set and communicate daily mobility goal to care team and patient/family/caregiver     - Collaborate with rehabilitation services on mobility goals if consulted  - Out of bed to chair 3 times a day   - Out of bed for meals 3 times a day  - Out of bed for toileting  - Record patient progress and toleration of activity level   8/3/2022 0810 by Reanna Packer RN  Outcome: Progressing  8/3/2022 0808 by Reanna Packer RN  Outcome: Progressing     Problem: INFECTION - ADULT  Goal: Absence or prevention of progression during hospitalization  Description: INTERVENTIONS:  - Assess and monitor for signs and symptoms of infection  - Monitor lab/diagnostic results  - Monitor all insertion sites, i e  indwelling lines, tubes, and drains  - Monitor endotracheal if appropriate and nasal secretions for changes in amount and color  - [place] appropriate cooling/warming therapies per order  - Administer medications as ordered  - Instruct and encourage patient and family to use good hand hygiene technique  - Identify and instruct in appropriate isolation precautions for identified infection/condition  8/3/2022 0810 by Benita Licea RN  Outcome: Progressing  8/3/2022 0808 by Benita Licea RN  Outcome: Progressing     Problem: DISCHARGE PLANNING  Goal: Discharge to home or other facility with appropriate resources  Description: INTERVENTIONS:  - Identify barriers to discharge w/patient and caregiver  - Arrange for needed discharge resources and transportation as appropriate  - Identify discharge learning needs (meds, wound care, etc )  - Arrange for interpretive services to assist at discharge as needed  - Refer to Case Management Department for coordinating discharge planning if the patient needs post-hospital services based on physician/advanced practitioner order or complex needs related to functional status, cognitive ability, or social support system  8/3/2022 0810 by Benita Licea RN  Outcome: Progressing  8/3/2022 0808 by Benita Licea RN  Outcome: Progressing     Problem: SKIN/TISSUE INTEGRITY - ADULT  Goal: Incision(s), wounds(s) or drain site(s) healing without S/S of infection  Description: INTERVENTIONS  - Assess and document dressing, incision, wound bed, drain sites and surrounding tissue  - Provide patient and family education  - Perform skin care/dressing changes every other day  8/3/2022 0810 by Benita Licea RN  Outcome: Progressing  8/3/2022 0808 by Benita Licea RN  Outcome: Progressing  Goal: Skin Integrity remains intact(Skin Breakdown Prevention)  Description: Assess:  -Perform Joseph assessment every q 12 hours  -Clean and moisturize skin every day  -Inspect skin when repositioning, toileting, and assisting with ADLS  [id number] by Benita Licea RN  Outcome: Progressing  8/3/2022 0808 by Benita Licea RN  Outcome: Progressing     Problem: PAIN - ADULT  Goal: Verbalizes/displays adequate comfort level or baseline comfort level  Description: Interventions:  - Encourage patient to monitor pain and request assistance  - Assess pain using appropriate pain scale  - Administer analgesics based on type and severity of pain and evaluate response  - Implement non-pharmacological measures as appropriate and evaluate response  - Consider cultural and social influences on pain and pain management  - Notify physician/advanced practitioner if interventions unsuccessful or patient reports new pain  8/3/2022 0810 by Cheryl Antony RN  Outcome: Progressing  8/3/2022 0808 by Cheryl Antony RN  Outcome: Progressing     Problem: CONFUSION/THOUGHT DISTURBANCE  Goal: Thought disturbances (confusion, delirium, depression, dementia or psychosis) are managed to maintain or return to baseline mental status and functional level  Description: INTERVENTIONS:  - Assess for possible contributors to  thought disturbance, including but not limited to medications, infection, impaired vision or hearing, underlying metabolic abnormalities, dehydration, respiratory compromise,  psychiatric diagnoses and notify attending PHYSICAN/AP  - Monitor and intervene to maintain adequate nutrition, hydration, elimination, sleep and activity  - Decrease environmental stimuli, including noise as appropriate  - Provide frequent contacts to provide refocusing, direction and reassurance as needed  Approach patient calmly with eye contact and at their level    - [place] high risk fall precautions, aspiration precautions and other safety measures, as indicated  - If delirium suspected, notify physician/AP of change in condition and request immediate in-person evaluation  - Pursue consults as appropriate including Geriatric (campus dependent), OT for cognitive evaluation/activity planning, psychiatric, pastoral care, etc   8/3/2022 0810 by Cheryl Antony RN  Outcome: Progressing  8/3/2022 0808 by Cheryl Antony RN  Outcome: Progressing     Problem: GASTROINTESTINAL - ADULT  Goal: Maintains adequate nutritional intake  Description: INTERVENTIONS:  - Monitor percentage of each meal consumed  - Identify factors contributing to decreased intake, treat as appropriate  - Assist with meals as needed  - Monitor I&O, weight, and lab values if indicated  - Obtain nutrition services referral as needed  8/3/2022 0810 by Rigoberto Galan RN  Outcome: Progressing  8/3/2022 0808 by Rigoberto Galan RN  Outcome: Progressing     Problem: METABOLIC, FLUID AND ELECTROLYTES - ADULT  Goal: Electrolytes maintained within normal limits  Description: INTERVENTIONS:  - Monitor labs and assess patient for signs and symptoms of electrolyte imbalances  - Administer electrolyte replacement as ordered  - Monitor response to electrolyte replacements, including repeat lab results as appropriate  - Instruct patient on fluid and nutrition as appropriate  8/3/2022 0810 by Rigoberto Galan RN  Outcome: Progressing  8/3/2022 0808 by Rigoberto Galan RN  Outcome: Progressing  Goal: Glucose maintained within target range  Description: INTERVENTIONS:  - Monitor Blood Glucose as ordered  - Assess for signs and symptoms of hyperglycemia and hypoglycemia  - Administer ordered medications to maintain glucose within target range  - Assess nutritional intake and initiate nutrition service referral as needed  8/3/2022 0810 by Rigoberto Galan RN  Outcome: Progressing  8/3/2022 0808 by Rigoberto Galan RN  Outcome: Progressing     Problem: Nutrition/Hydration-ADULT  Goal: Nutrient/Hydration intake appropriate for improving, restoring or maintaining nutritional needs  Description: Monitor and assess patient's nutrition/hydration status for malnutrition  Collaborate with interdisciplinary team and initiate plan and interventions as ordered  Monitor patient's weight and dietary intake as ordered or per policy  Utilize nutrition screening tool and intervene as necessary  Determine patient's food preferences and provide high-protein, high-caloric foods as appropriate  INTERVENTIONS:  - Monitor oral intake, urinary output, labs, and treatment plans  - Assess nutrition and hydration status and recommend course of action  - Evaluate amount of meals eaten  - Assist patient with eating if necessary   - Allow adequate time for meals  - Recommend/ encourage appropriate diets, oral nutritional supplements, and vitamin/mineral supplements  - Order, calculate, and assess calorie counts as needed  - Recommend, monitor, and adjust tube feedings and TPN/PPN based on assessed needs  - Assess need for intravenous fluids  - Provide specific nutrition/hydration education as appropriate  - Include patient/family/caregiver in decisions related to nutrition  8/3/2022 0810 by Domenica Gonzalez, RN  Outcome: Progressing  8/3/2022 0808 by Domenica Gonzalez, RN  Outcome: Progressing

## 2022-08-03 NOTE — NURSING NOTE
Per Dr Kermit Apgar, no IV access needed at this time  Pt is not ordered anything IV and is currently admitted due to awaiting placement

## 2022-08-04 LAB
FLUAV RNA RESP QL NAA+PROBE: NEGATIVE
FLUBV RNA RESP QL NAA+PROBE: NEGATIVE
RSV RNA RESP QL NAA+PROBE: NEGATIVE
SARS-COV-2 RNA RESP QL NAA+PROBE: POSITIVE

## 2022-08-04 PROCEDURE — 0241U HB NFCT DS VIR RESP RNA 4 TRGT: CPT | Performed by: INTERNAL MEDICINE

## 2022-08-04 PROCEDURE — 87493 C DIFF AMPLIFIED PROBE: CPT | Performed by: PHYSICIAN ASSISTANT

## 2022-08-04 PROCEDURE — 99233 SBSQ HOSP IP/OBS HIGH 50: CPT | Performed by: INTERNAL MEDICINE

## 2022-08-04 RX ADMIN — HEPARIN SODIUM 5000 UNITS: 5000 INJECTION INTRAVENOUS; SUBCUTANEOUS at 06:55

## 2022-08-04 RX ADMIN — MIRTAZAPINE 15 MG: 15 TABLET, FILM COATED ORAL at 21:02

## 2022-08-04 RX ADMIN — ASPIRIN 81 MG: 81 TABLET, COATED ORAL at 08:08

## 2022-08-04 RX ADMIN — HEPARIN SODIUM 5000 UNITS: 5000 INJECTION INTRAVENOUS; SUBCUTANEOUS at 21:02

## 2022-08-04 RX ADMIN — POTASSIUM CHLORIDE 20 MEQ: 1500 TABLET, EXTENDED RELEASE ORAL at 08:08

## 2022-08-04 RX ADMIN — HEPARIN SODIUM 5000 UNITS: 5000 INJECTION INTRAVENOUS; SUBCUTANEOUS at 13:42

## 2022-08-04 RX ADMIN — AMLODIPINE BESYLATE 10 MG: 10 TABLET ORAL at 08:08

## 2022-08-04 NOTE — PLAN OF CARE
Problem: Potential for Falls  Goal: Patient will remain free of falls  Description: INTERVENTIONS:  - Educate patient/family on patient safety including physical limitations  - Instruct patient to call for assistance with activity   - Consult OT/PT to assist with strengthening/mobility   - Keep Call bell within reach  - Keep bed low and locked with side rails adjusted as appropriate  - Keep care items and personal belongings within reach  - Initiate and maintain comfort rounds  - Make Fall Risk Sign visible to staff  - Offer Toileting every 2 Hours, in advance of need  - Initiate/Maintain bed/chair alarm  - Obtain necessary fall risk management equipment:bed/chair alarm, call bell, walker, gripper socks, rounds  - Apply yellow socks and bracelet for high fall risk patients  - Consider moving patient to room near nurses station  Outcome: Progressing     Problem: Prexisting or High Potential for Compromised Skin Integrity  Goal: Skin integrity is maintained or improved  Description: INTERVENTIONS:  - Identify patients at risk for skin breakdown  - Assess and monitor skin integrity  - Assess and monitor nutrition and hydration status  - Monitor labs   - Assess for incontinence   - Instruct patient to turn and repostion every 2 hours while in bed and weight shift every 15 minutes while in chair  - Assist with mobility/ambulation  - Relieve pressure over bony prominences  - Avoid friction and shearing  - Provide appropriate hygiene as needed including keeping skin clean and dry  - Evaluate need for skin moisturizer/barrier cream  - Collaborate with interdisciplinary team   - Patient/family teaching  - Consider wound care consult   Outcome: Progressing     Problem: MOBILITY - ADULT  Goal: Maintain or return to baseline ADL function  Description: INTERVENTIONS:  -  Assess patient's ability to carry out ADLs; assess patient's baseline for ADL function and identify physical deficits which impact ability to perform ADLs (bathing, care of mouth/teeth, toileting, grooming, dressing, etc )  - Assess/evaluate cause of self-care deficits   - Assess range of motion  - Assess patient's mobility; develop plan if impaired  - Assess patient's need for assistive devices and provide as appropriate  - Encourage maximum independence but intervene and supervise when necessary  - Involve family in performance of ADLs  - Assess for home care needs following discharge   - Consider OT consult to assist with ADL evaluation and planning for discharge  - Provide patient education as appropriate  Outcome: Progressing  Goal: Maintains/Returns to pre admission functional level  Description: INTERVENTIONS:  - Perform BMAT or MOVE assessment daily    - Set and communicate daily mobility goal to care team and patient/family/caregiver     - Collaborate with rehabilitation services on mobility goals if consulted  - Out of bed to chair 3 times a day   - Out of bed for meals 3 times a day  - Out of bed for toileting  - Record patient progress and toleration of activity level   Outcome: Progressing     Problem: INFECTION - ADULT  Goal: Absence or prevention of progression during hospitalization  Description: INTERVENTIONS:  - Assess and monitor for signs and symptoms of infection  - Monitor lab/diagnostic results  - Monitor all insertion sites, i e  indwelling lines, tubes, and drains  - Monitor endotracheal if appropriate and nasal secretions for changes in amount and color  - [place] appropriate cooling/warming therapies per order  - Administer medications as ordered  - Instruct and encourage patient and family to use good hand hygiene technique  - Identify and instruct in appropriate isolation precautions for identified infection/condition  Outcome: Progressing     Problem: DISCHARGE PLANNING  Goal: Discharge to home or other facility with appropriate resources  Description: INTERVENTIONS:  - Identify barriers to discharge w/patient and caregiver  - Arrange for needed discharge resources and transportation as appropriate  - Identify discharge learning needs (meds, wound care, etc )  - Arrange for interpretive services to assist at discharge as needed  - Refer to Case Management Department for coordinating discharge planning if the patient needs post-hospital services based on physician/advanced practitioner order or complex needs related to functional status, cognitive ability, or social support system  Outcome: Progressing     Problem: SKIN/TISSUE INTEGRITY - ADULT  Goal: Incision(s), wounds(s) or drain site(s) healing without S/S of infection  Description: INTERVENTIONS  - Assess and document dressing, incision, wound bed, drain sites and surrounding tissue  - Provide patient and family education  - Perform skin care/dressing changes every other day  Outcome: Progressing  Goal: Skin Integrity remains intact(Skin Breakdown Prevention)  Description: Assess:  -Perform Joseph assessment every q 12 hours  -Clean and moisturize skin every day  -Inspect skin when repositioning, toileting, and assisting with ADLS  Outcome: Progressing     Problem: PAIN - ADULT  Goal: Verbalizes/displays adequate comfort level or baseline comfort level  Description: Interventions:  - Encourage patient to monitor pain and request assistance  - Assess pain using appropriate pain scale  - Administer analgesics based on type and severity of pain and evaluate response  - Implement non-pharmacological measures as appropriate and evaluate response  - Consider cultural and social influences on pain and pain management  - Notify physician/advanced practitioner if interventions unsuccessful or patient reports new pain  Outcome: Progressing     Problem: CONFUSION/THOUGHT DISTURBANCE  Goal: Thought disturbances (confusion, delirium, depression, dementia or psychosis) are managed to maintain or return to baseline mental status and functional level  Description: INTERVENTIONS:  - Assess for possible contributors to  thought disturbance, including but not limited to medications, infection, impaired vision or hearing, underlying metabolic abnormalities, dehydration, respiratory compromise,  psychiatric diagnoses and notify attending PHYSICAN/AP  - Monitor and intervene to maintain adequate nutrition, hydration, elimination, sleep and activity  - Decrease environmental stimuli, including noise as appropriate  - Provide frequent contacts to provide refocusing, direction and reassurance as needed  Approach patient calmly with eye contact and at their level    - [place] high risk fall precautions, aspiration precautions and other safety measures, as indicated  - If delirium suspected, notify physician/AP of change in condition and request immediate in-person evaluation  - Pursue consults as appropriate including Geriatric (campus dependent), OT for cognitive evaluation/activity planning, psychiatric, pastoral care, etc   Outcome: Progressing     Problem: GASTROINTESTINAL - ADULT  Goal: Maintains adequate nutritional intake  Description: INTERVENTIONS:  - Monitor percentage of each meal consumed  - Identify factors contributing to decreased intake, treat as appropriate  - Assist with meals as needed  - Monitor I&O, weight, and lab values if indicated  - Obtain nutrition services referral as needed  Outcome: Progressing     Problem: METABOLIC, FLUID AND ELECTROLYTES - ADULT  Goal: Electrolytes maintained within normal limits  Description: INTERVENTIONS:  - Monitor labs and assess patient for signs and symptoms of electrolyte imbalances  - Administer electrolyte replacement as ordered  - Monitor response to electrolyte replacements, including repeat lab results as appropriate  - Instruct patient on fluid and nutrition as appropriate  Outcome: Progressing  Goal: Glucose maintained within target range  Description: INTERVENTIONS:  - Monitor Blood Glucose as ordered  - Assess for signs and symptoms of hyperglycemia and hypoglycemia  - Administer ordered medications to maintain glucose within target range  - Assess nutritional intake and initiate nutrition service referral as needed  Outcome: Progressing     Problem: Nutrition/Hydration-ADULT  Goal: Nutrient/Hydration intake appropriate for improving, restoring or maintaining nutritional needs  Description: Monitor and assess patient's nutrition/hydration status for malnutrition  Collaborate with interdisciplinary team and initiate plan and interventions as ordered  Monitor patient's weight and dietary intake as ordered or per policy  Utilize nutrition screening tool and intervene as necessary  Determine patient's food preferences and provide high-protein, high-caloric foods as appropriate       INTERVENTIONS:  - Monitor oral intake, urinary output, labs, and treatment plans  - Assess nutrition and hydration status and recommend course of action  - Evaluate amount of meals eaten  - Assist patient with eating if necessary   - Allow adequate time for meals  - Recommend/ encourage appropriate diets, oral nutritional supplements, and vitamin/mineral supplements  - Order, calculate, and assess calorie counts as needed  - Recommend, monitor, and adjust tube feedings and TPN/PPN based on assessed needs  - Assess need for intravenous fluids  - Provide specific nutrition/hydration education as appropriate  - Include patient/family/caregiver in decisions related to nutrition  Outcome: Progressing

## 2022-08-04 NOTE — PLAN OF CARE
Problem: Potential for Falls  Goal: Patient will remain free of falls  Description: INTERVENTIONS:  - Educate patient/family on patient safety including physical limitations  - Instruct patient to call for assistance with activity   - Consult OT/PT to assist with strengthening/mobility   - Keep Call bell within reach  - Keep bed low and locked with side rails adjusted as appropriate  - Keep care items and personal belongings within reach  - Initiate and maintain comfort rounds  - Make Fall Risk Sign visible to staff  - Offer Toileting every 2 Hours, in advance of need  - Initiate/Maintain bed/chair alarm  - Obtain necessary fall risk management equipment:bed/chair alarm, call bell, walker, gripper socks, rounds  - Apply yellow socks and bracelet for high fall risk patients  - Consider moving patient to room near nurses station  Outcome: Progressing     Problem: Prexisting or High Potential for Compromised Skin Integrity  Goal: Skin integrity is maintained or improved  Description: INTERVENTIONS:  - Identify patients at risk for skin breakdown  - Assess and monitor skin integrity  - Assess and monitor nutrition and hydration status  - Monitor labs   - Assess for incontinence   - Instruct patient to turn and repostion every 2 hours while in bed and weight shift every 15 minutes while in chair  - Assist with mobility/ambulation  - Relieve pressure over bony prominences  - Avoid friction and shearing  - Provide appropriate hygiene as needed including keeping skin clean and dry  - Evaluate need for skin moisturizer/barrier cream  - Collaborate with interdisciplinary team   - Patient/family teaching  - Consider wound care consult   Outcome: Progressing     Problem: MOBILITY - ADULT  Goal: Maintain or return to baseline ADL function  Description: INTERVENTIONS:  -  Assess patient's ability to carry out ADLs; assess patient's baseline for ADL function and identify physical deficits which impact ability to perform ADLs (bathing, care of mouth/teeth, toileting, grooming, dressing, etc )  - Assess/evaluate cause of self-care deficits   - Assess range of motion  - Assess patient's mobility; develop plan if impaired  - Assess patient's need for assistive devices and provide as appropriate  - Encourage maximum independence but intervene and supervise when necessary  - Involve family in performance of ADLs  - Assess for home care needs following discharge   - Consider OT consult to assist with ADL evaluation and planning for discharge  - Provide patient education as appropriate  Outcome: Progressing  Goal: Maintains/Returns to pre admission functional level  Description: INTERVENTIONS:  - Perform BMAT or MOVE assessment daily    - Set and communicate daily mobility goal to care team and patient/family/caregiver     - Collaborate with rehabilitation services on mobility goals if consulted  - Out of bed to chair 3 times a day   - Out of bed for meals 3 times a day  - Out of bed for toileting  - Record patient progress and toleration of activity level   Outcome: Progressing     Problem: INFECTION - ADULT  Goal: Absence or prevention of progression during hospitalization  Description: INTERVENTIONS:  - Assess and monitor for signs and symptoms of infection  - Monitor lab/diagnostic results  - Monitor all insertion sites, i e  indwelling lines, tubes, and drains  - Monitor endotracheal if appropriate and nasal secretions for changes in amount and color  - [place] appropriate cooling/warming therapies per order  - Administer medications as ordered  - Instruct and encourage patient and family to use good hand hygiene technique  - Identify and instruct in appropriate isolation precautions for identified infection/condition  Outcome: Progressing     Problem: DISCHARGE PLANNING  Goal: Discharge to home or other facility with appropriate resources  Description: INTERVENTIONS:  - Identify barriers to discharge w/patient and caregiver  - Arrange for needed discharge resources and transportation as appropriate  - Identify discharge learning needs (meds, wound care, etc )  - Arrange for interpretive services to assist at discharge as needed  - Refer to Case Management Department for coordinating discharge planning if the patient needs post-hospital services based on physician/advanced practitioner order or complex needs related to functional status, cognitive ability, or social support system  Outcome: Progressing     Problem: SKIN/TISSUE INTEGRITY - ADULT  Goal: Incision(s), wounds(s) or drain site(s) healing without S/S of infection  Description: INTERVENTIONS  - Assess and document dressing, incision, wound bed, drain sites and surrounding tissue  - Provide patient and family education  - Perform skin care/dressing changes every other day  Outcome: Progressing  Goal: Skin Integrity remains intact(Skin Breakdown Prevention)  Description: Assess:  -Perform Joseph assessment every q 12 hours  -Clean and moisturize skin every day  -Inspect skin when repositioning, toileting, and assisting with ADLS  Outcome: Progressing     Problem: PAIN - ADULT  Goal: Verbalizes/displays adequate comfort level or baseline comfort level  Description: Interventions:  - Encourage patient to monitor pain and request assistance  - Assess pain using appropriate pain scale  - Administer analgesics based on type and severity of pain and evaluate response  - Implement non-pharmacological measures as appropriate and evaluate response  - Consider cultural and social influences on pain and pain management  - Notify physician/advanced practitioner if interventions unsuccessful or patient reports new pain  Outcome: Progressing     Problem: CONFUSION/THOUGHT DISTURBANCE  Goal: Thought disturbances (confusion, delirium, depression, dementia or psychosis) are managed to maintain or return to baseline mental status and functional level  Description: INTERVENTIONS:  - Assess for possible contributors to  thought disturbance, including but not limited to medications, infection, impaired vision or hearing, underlying metabolic abnormalities, dehydration, respiratory compromise,  psychiatric diagnoses and notify attending PHYSICAN/AP  - Monitor and intervene to maintain adequate nutrition, hydration, elimination, sleep and activity  - Decrease environmental stimuli, including noise as appropriate  - Provide frequent contacts to provide refocusing, direction and reassurance as needed  Approach patient calmly with eye contact and at their level    - [place] high risk fall precautions, aspiration precautions and other safety measures, as indicated  - If delirium suspected, notify physician/AP of change in condition and request immediate in-person evaluation  - Pursue consults as appropriate including Geriatric (campus dependent), OT for cognitive evaluation/activity planning, psychiatric, pastoral care, etc   Outcome: Progressing     Problem: GASTROINTESTINAL - ADULT  Goal: Maintains adequate nutritional intake  Description: INTERVENTIONS:  - Monitor percentage of each meal consumed  - Identify factors contributing to decreased intake, treat as appropriate  - Assist with meals as needed  - Monitor I&O, weight, and lab values if indicated  - Obtain nutrition services referral as needed  Outcome: Progressing     Problem: METABOLIC, FLUID AND ELECTROLYTES - ADULT  Goal: Electrolytes maintained within normal limits  Description: INTERVENTIONS:  - Monitor labs and assess patient for signs and symptoms of electrolyte imbalances  - Administer electrolyte replacement as ordered  - Monitor response to electrolyte replacements, including repeat lab results as appropriate  - Instruct patient on fluid and nutrition as appropriate  Outcome: Progressing  Goal: Glucose maintained within target range  Description: INTERVENTIONS:  - Monitor Blood Glucose as ordered  - Assess for signs and symptoms of hyperglycemia and hypoglycemia  - Administer ordered medications to maintain glucose within target range  - Assess nutritional intake and initiate nutrition service referral as needed  Outcome: Progressing     Problem: Nutrition/Hydration-ADULT  Goal: Nutrient/Hydration intake appropriate for improving, restoring or maintaining nutritional needs  Description: Monitor and assess patient's nutrition/hydration status for malnutrition  Collaborate with interdisciplinary team and initiate plan and interventions as ordered  Monitor patient's weight and dietary intake as ordered or per policy  Utilize nutrition screening tool and intervene as necessary  Determine patient's food preferences and provide high-protein, high-caloric foods as appropriate       INTERVENTIONS:  - Monitor oral intake, urinary output, labs, and treatment plans  - Assess nutrition and hydration status and recommend course of action  - Evaluate amount of meals eaten  - Assist patient with eating if necessary   - Allow adequate time for meals  - Recommend/ encourage appropriate diets, oral nutritional supplements, and vitamin/mineral supplements  - Order, calculate, and assess calorie counts as needed  - Recommend, monitor, and adjust tube feedings and TPN/PPN based on assessed needs  - Assess need for intravenous fluids  - Provide specific nutrition/hydration education as appropriate  - Include patient/family/caregiver in decisions related to nutrition  Outcome: Progressing

## 2022-08-04 NOTE — ASSESSMENT & PLAN NOTE
New onset diabetes mellitus   Unclear with the this was undetected and detected as inpt here or this was precipitated by COVID infection  · Hemoglobin A1c checked and is 8 8  · Monitor blood sugars while admitted  · Patient is eating quite poorly, will start Magic Cup

## 2022-08-04 NOTE — CASE MANAGEMENT
Case Management Discharge Planning Note    Patient name Stephany Ledesma  Location [place] [facility] [facility] Jacquie Linn* MRN [id number]  : 1935 Date 2022       Current Admission Date: 2022  Current Admission Diagnosis:Cellulitis   Patient Active Problem List    Diagnosis Date Noted    Depression 2022    COVID-19 2022    Malignant melanoma of leg, left ([facility] [address] ) 2022    Encounter for competency evaluation 2022    Hypertension 2022    Cellulitis 2022    Leg mass 2022    Type 2 diabetes mellitus with hyperglycemia, without long-term current use of insulin ([facility] [address] ) 2022      LOS (days): 26  Geometric Mean LOS (GMLOS) (days): 4 60  Days to GMLOS:-21 3     OBJECTIVE:  Risk of Unplanned Readmission Score: 13 09         Current admission status: Inpatient   Preferred Pharmacy:   [address] [place] [address] [id number]  Phone: [phone] Fax: [phone]    Primary Care Provider: No primary care provider on file  Primary Insurance: MEDICARE  Secondary Insurance: [address] [place] [address],[facility] SHIELD    DISCHARGE DETAILS:    Discharge planning discussed with[de-identified] pt's daughter Samaria Mckee     Comments - Freedom of Choice: Continues to be agreeable to [facility] STR to LTC                Contacts  Patient Contacts: Samaria Lucks  Relationship to Patient[de-identified] Family  Contact Method: Phone  Phone Number: [phone]  Reason/Outcome: Discharge Planning                        Treatment Team Recommendation: SNF  Discharge Destination Plan[de-identified] SNF ([address])  Transport at Discharge : [facility]     Number/Name of Dispatcher: Roundtrip     ETA of Transport (Date): 22                 IMM Given (Date):: 22  IMM Given to[de-identified] Family (via phone w/ daughter Samaria Mckee)          [address], [address] :  [facility]  Receiving Facility/Agency Phone Number: [phone]  Facility/Agency Fax Number: [phone]

## 2022-08-04 NOTE — ASSESSMENT & PLAN NOTE
Pathology showing Melanoma  Discussed case with Oncology Priscilla Manzano MD   · Recommendations were: Pathology noted  F/u to be placed with Dr Nohelia River for melanoma medical oncology care  · CT CAP without any mets  · Plastics consult per surgery recommendations for complete excision to be done outpatient  · Dermatology outpatient follow-up for skin exam to assess for other sites of potential melanoma

## 2022-08-04 NOTE — PROGRESS NOTES
[facility]  Progress Note - Rita Hives 1935, 82 y o  male MRN: [id number]  Unit/Bed#: [place] [address] -01 Encounter: [id number]  Primary Care Provider: No primary care provider on file  Date and time admitted to hospital: 7/9/2022 11:02 AM    * Cellulitis  Assessment & Plan  Presented with left leg cellulitis, surrounding wounds, and growth on leg  · Maggots were found in the wound upon presentation to the ED  · Ct lower extremity: "Extensive subcutaneous edema likely representing cellulitis  Although evaluation for abscess is limited in the absence images contrast no obvious discrete collection is identified  Dermal soft tissue lesion seen in association with the posterior medial calf "  · Completed 7 days of IV antibiotics  · Blood cultures negative thus far    Depression  Assessment & Plan  Remeron started per psych recommendations    Malignant melanoma of leg, left [facility])  Assessment & Plan  Pathology showing Melanoma  Discussed case with Oncology Nai Real MD   · Recommendations were: Pathology noted  F/u to be placed with Dr Karyle Reasoner for melanoma medical oncology care  · CT CAP without any mets  · Plastics consult per surgery recommendations for complete excision to be done outpatient  · Dermatology outpatient follow-up for skin exam to assess for other sites of potential melanoma  COVID-19  Assessment & Plan  Detected to be COVID-19 positive on 07/20/2022  Asymptomatic  Now off isolation  Encounter for competency evaluation  Assessment & Plan  · Neuropsychiatry consultation appreciated, at this point patient does not have capacity to make informed medical decisions    Hypertension  Assessment & Plan  Controlled  - Continue Amlodipine    Type 2 diabetes mellitus with hyperglycemia, without long-term current use of insulin (HCC)  Assessment & Plan  New onset diabetes mellitus   Unclear with the this was undetected and detected as inpt here or this was precipitated by COVID infection  · Hemoglobin A1c checked and is 8 8  · Monitor blood sugars while admitted  · Patient is eating quite poorly, will start Magic Cup    Leg mass  Assessment & Plan  · Seen and evaluated by General surgery  · Appears mass has been there for quite some time  · Status post bedside incisional biopsy 07/13/2022  · Pathology pending  Definitive treatment pending biopsy results  · Pain control      [facility] Internal Medicine Progress Note  Patient: Xin Patel 82 y o  male   MRN: [id number]  PCP: No primary care provider on file  Unit/Bed#: [place] [address] 2 -01 Encounter: [id number]  Date Of Visit: 08/03/22    Assessment:    Principal Problem:    Cellulitis  Active Problems:    Leg mass    Type 2 diabetes mellitus with hyperglycemia, without long-term current use of insulin ([facility] [address] )    Hypertension    Encounter for competency evaluation    COVID-19    Malignant melanoma of leg, left ([facility] [address] )    Depression      Plan:    · Start Magic cup  · Encourage diet       VTE Pharmacologic Prophylaxis:   Pharmacologic: Heparin  Mechanical VTE Prophylaxis in Place: Yes    Patient Centered Rounds: I have performed bedside rounds with nursing staff today  Discussions with Specialists or Other Care Team Provider:  Discussed with case management    Education and Discussions with Family / Patient:  No family to update    Time Spent for Care: 30 minutes  More than 50% of total time spent on counseling and coordination of care as described above  Current Length of Stay: 25 day(s)    Current Patient Status: Inpatient   Certification Statement: The patient will continue to require additional inpatient hospital stay due to Placement    Discharge Plan / Estimated Discharge Date:  Tomorrow    Code Status: Level 1 - Full Code      Subjective:   Seen and examined, per nursing the patient is eating very poorly  He usually has a cup of coffee by his bedside but does not eat much more than that    The patient denies any chest pain, no shortness of breath no difficulty breathing  A complete and comprehensive 14 point organ system review has been performed and all other systems are negative other than stated above  Objective:     Vitals:   Temp (24hrs), Av 1 °F (36 7 °C), Min:97 9 °F (36 6 °C), Max:98 3 °F (36 8 °C)    Temp:  [97 9 °F (36 6 °C)-98 3 °F (36 8 °C)] 98 3 °F (36 8 °C)  HR:  [93-97] 97  Resp:  [14-20] 20  BP: (139-140)/(74-75) 139/74  SpO2:  [95 %-96 %] 96 %  Body mass index is 29 79 kg/m²  Input and Output Summary (last 24 hours): Intake/Output Summary (Last 24 hours) at 8/3/2022 2123  Last data filed at 8/3/2022 0601  Gross per 24 hour   Intake --   Output 125 ml   Net -125 ml       Physical Exam:     General:  Appears chronically ill  HEENT: atraumatic, PERRLA, moist mucosa, normal pharynx, normal tonsils and adenoids, normal tongue, no fluid in sinuses  Neck: Trachea midline, no carotid bruit, no masses  Respiratory: normal chest wall expansion, CTA B, no r/r/w, no rubs  Cardiovascular: RRR, no m/r/g, Normal S1 and S2  Abdomen: Soft, non-tender, non-distended, normal bowel sounds in all quadrants, no hepatosplenomegaly, no tympany  Rectal: deferred  Musculoskeletal:  Moves all   Integumentary: warm, dry, and pink, with no rash, purpura, or petechia  Heme/Lymph: no lymphadenopathy, no bruises  Neurological: Cranial Nerves II-XII grossly intact, no tics, normal sensation to pressure and light touch  Psychiatric: cooperative with normal mood, affect, and cognition      Additional Data:     Labs: Invalid input(s): LABALBU        * I Have Reviewed All Lab Data Listed Above  * Additional Pertinent Lab Tests Reviewed:  [address] Admission Reviewed    Imaging:    Imaging Reports Reviewed Today Include:  No new imaging  Imaging Personally Reviewed by Myself Includes:  No new imaging    Recent Cultures (last 7 days):           Last 24 Hours Medication List: Current Facility-Administered Medications   Medication Dose Route Frequency Provider Last Rate    acetaminophen  650 mg Oral Q6H PRN Vanessa Kim DO      al mag oxide-diphenhydramine-lidocaine viscous  10 mL Swish & Swallow Q6H PRN Chris Tipton MD      amLODIPine  10 mg Oral Daily Maciej Adams MD      aspirin  81 mg Oral Daily Maciej Adams MD      butalbital-acetaminophen-caffeine  1 tablet Oral Q4H PRN Chris Tipton MD      heparin (porcine)  5,000 Units Subcutaneous [facility] Vanessa Kim DO      mirtazapine  15 mg Oral HS Scot Schaffer MD      oxyCODONE  2 5 mg Oral Q8H PRN Noble Cosme DO      potassium chloride  20 mEq Oral Daily Manchestermakenzie Cantu MD          Today, Patient Was Seen By: Gabrielle Rich DO    ** Please Note: This note was completed in part utilizing [employer] Fluency Direct Software  Grammatical errors, random word insertions, spelling mistakes, and incomplete sentences may be an occasional consequence of this system secondary to software limitations, ambient noise, and hardware issues  If you have any questions or concerns about the content, text, or information contained within the body of this dictation, please contact the provider for clarification   **

## 2022-08-05 ENCOUNTER — TELEPHONE (OUTPATIENT)
Dept: HEMATOLOGY ONCOLOGY | Facility: CLINIC | Age: 87
End: 2022-08-05

## 2022-08-05 VITALS
SYSTOLIC BLOOD PRESSURE: 138 MMHG | TEMPERATURE: 98.4 F | RESPIRATION RATE: 17 BRPM | WEIGHT: 201.72 LBS | HEART RATE: 95 BPM | OXYGEN SATURATION: 93 % | DIASTOLIC BLOOD PRESSURE: 73 MMHG | BODY MASS INDEX: 29.88 KG/M2 | HEIGHT: 69 IN

## 2022-08-05 PROBLEM — A04.72 C. DIFFICILE COLITIS: Status: ACTIVE | Noted: 2022-01-01

## 2022-08-05 LAB
C DIFF TOX A+B STL QL IA: POSITIVE
C DIFF TOX GENS STL QL NAA+PROBE: POSITIVE

## 2022-08-05 PROCEDURE — 99239 HOSP IP/OBS DSCHRG MGMT >30: CPT | Performed by: INTERNAL MEDICINE

## 2022-08-05 RX ORDER — AMLODIPINE BESYLATE 10 MG/1
10 TABLET ORAL DAILY
Qty: 30 TABLET | Refills: 0
Start: 2022-08-06

## 2022-08-05 RX ORDER — ASPIRIN 81 MG/1
81 TABLET ORAL DAILY
Qty: 30 TABLET | Refills: 0
Start: 2022-08-06

## 2022-08-05 RX ORDER — MIRTAZAPINE 15 MG/1
15 TABLET, FILM COATED ORAL
Qty: 30 TABLET | Refills: 0
Start: 2022-08-05

## 2022-08-05 RX ADMIN — HEPARIN SODIUM 5000 UNITS: 5000 INJECTION INTRAVENOUS; SUBCUTANEOUS at 06:15

## 2022-08-05 RX ADMIN — POTASSIUM CHLORIDE 20 MEQ: 1500 TABLET, EXTENDED RELEASE ORAL at 08:04

## 2022-08-05 RX ADMIN — Medication 125 MG: at 13:08

## 2022-08-05 RX ADMIN — ASPIRIN 81 MG: 81 TABLET, COATED ORAL at 08:04

## 2022-08-05 RX ADMIN — AMLODIPINE BESYLATE 10 MG: 10 TABLET ORAL at 08:04

## 2022-08-05 RX ADMIN — HEPARIN SODIUM 5000 UNITS: 5000 INJECTION INTRAVENOUS; SUBCUTANEOUS at 13:09

## 2022-08-05 NOTE — ASSESSMENT & PLAN NOTE
Pathology showing Melanoma  Discussed case with Oncology Deric Almendarez MD   · Recommendations were: Pathology noted  F/u to be placed with Dr Liu for melanoma medical oncology care  · CT CAP without any mets  · Plastics consult per surgery recommendations for complete excision to be done outpatient  · Dermatology outpatient follow-up for skin exam to assess for other sites of potential melanoma

## 2022-08-05 NOTE — NURSING NOTE
Patient discharged to [facility] facility  IV removed per protocol  AVS was reviewed in detail to receiving nurse at [address]  Patient was sent with all belongings including, clothing, dentures, glasses, watch personal hygiene items, and puzzle word books  All questions and concerns were addressed at this time

## 2022-08-05 NOTE — ASSESSMENT & PLAN NOTE
· Seen and evaluated by General surgery  · Appears mass has been there for quite some time  · Status post bedside incisional biopsy 07/13/2022  Path with A  Skin, left lower leg, incisional biopsy:  Melanoma, ulcerated   See synoptic report and note    Will need outpatient follow-up- family aware of finding

## 2022-08-05 NOTE — PLAN OF CARE
Problem: Potential for Falls  Goal: Patient will remain free of falls  Description: INTERVENTIONS:  - Educate patient/family on patient safety including physical limitations  - Instruct patient to call for assistance with activity   - Consult OT/PT to assist with strengthening/mobility   - Keep Call bell within reach  - Keep bed low and locked with side rails adjusted as appropriate  - Keep care items and personal belongings within reach  - Initiate and maintain comfort rounds  - Make Fall Risk Sign visible to staff  - Offer Toileting every 2 Hours, in advance of need  - Initiate/Maintain bed/chair alarm  - Obtain necessary fall risk management equipment:bed/chair alarm, call bell, walker, gripper socks, rounds  - Apply yellow socks and bracelet for high fall risk patients  - Consider moving patient to room near nurses station  8/5/2022 1303 by Prince Peggy RN  Outcome: Adequate for Discharge  8/5/2022 0729 by Prince Peggy RN  Outcome: Progressing     Problem: Prexisting or High Potential for Compromised Skin Integrity  Goal: Skin integrity is maintained or improved  Description: INTERVENTIONS:  - Identify patients at risk for skin breakdown  - Assess and monitor skin integrity  - Assess and monitor nutrition and hydration status  - Monitor labs   - Assess for incontinence   - Instruct patient to turn and repostion every 2 hours while in bed and weight shift every 15 minutes while in chair  - Assist with mobility/ambulation  - Relieve pressure over bony prominences  - Avoid friction and shearing  - Provide appropriate hygiene as needed including keeping skin clean and dry  - Evaluate need for skin moisturizer/barrier cream  - Collaborate with interdisciplinary team   - Patient/family teaching  - Consider wound care consult   8/5/2022 1303 by Prince Peggy RN  Outcome: Adequate for Discharge  8/5/2022 0729 by Prince Peggy RN  Outcome: Progressing     Problem: MOBILITY - ADULT  Goal: Maintain or return to baseline ADL function  Description: INTERVENTIONS:  -  Assess patient's ability to carry out ADLs; assess patient's baseline for ADL function and identify physical deficits which impact ability to perform ADLs (bathing, care of mouth/teeth, toileting, grooming, dressing, etc )  - Assess/evaluate cause of self-care deficits   - Assess range of motion  - Assess patient's mobility; develop plan if impaired  - Assess patient's need for assistive devices and provide as appropriate  - Encourage maximum independence but intervene and supervise when necessary  - Involve family in performance of ADLs  - Assess for home care needs following discharge   - Consider OT consult to assist with ADL evaluation and planning for discharge  - Provide patient education as appropriate  8/5/2022 1303 by Perri Trevino RN  Outcome: Adequate for Discharge  8/5/2022 0729 by Perri Trevino RN  Outcome: Progressing  Goal: Maintains/Returns to pre admission functional level  Description: INTERVENTIONS:  - Perform BMAT or MOVE assessment daily    - Set and communicate daily mobility goal to care team and patient/family/caregiver     - Collaborate with rehabilitation services on mobility goals if consulted  - Out of bed to chair 3 times a day   - Out of bed for meals 3 times a day  - Out of bed for toileting  - Record patient progress and toleration of activity level   8/5/2022 1303 by Perri Trevino RN  Outcome: Adequate for Discharge  8/5/2022 0729 by Perri Trevino RN  Outcome: Progressing     Problem: INFECTION - ADULT  Goal: Absence or prevention of progression during hospitalization  Description: INTERVENTIONS:  - Assess and monitor for signs and symptoms of infection  - Monitor lab/diagnostic results  - Monitor all insertion sites, i e  indwelling lines, tubes, and drains  - Monitor endotracheal if appropriate and nasal secretions for changes in amount and color  - Carmel appropriate cooling/warming therapies per order  - Administer medications as ordered  - Instruct and encourage patient and family to use good hand hygiene technique  - Identify and instruct in appropriate isolation precautions for identified infection/condition  8/5/2022 1303 by Yolie Henao RN  Outcome: Adequate for Discharge  8/5/2022 0729 by Yolie Henao RN  Outcome: Progressing     Problem: DISCHARGE PLANNING  Goal: Discharge to home or other facility with appropriate resources  Description: INTERVENTIONS:  - Identify barriers to discharge w/patient and caregiver  - Arrange for needed discharge resources and transportation as appropriate  - Identify discharge learning needs (meds, wound care, etc )  - Arrange for interpretive services to assist at discharge as needed  - Refer to Case Management Department for coordinating discharge planning if the patient needs post-hospital services based on physician/advanced practitioner order or complex needs related to functional status, cognitive ability, or social support system  8/5/2022 1303 by Yolie Henao RN  Outcome: Adequate for Discharge  8/5/2022 0729 by Yolie Henao RN  Outcome: Progressing     Problem: SKIN/TISSUE INTEGRITY - ADULT  Goal: Incision(s), wounds(s) or drain site(s) healing without S/S of infection  Description: INTERVENTIONS  - Assess and document dressing, incision, wound bed, drain sites and surrounding tissue  - Provide patient and family education  - Perform skin care/dressing changes every other day  8/5/2022 1303 by Yolie Henao RN  Outcome: Adequate for Discharge  8/5/2022 0729 by Yolie Henao RN  Outcome: Progressing  Goal: Skin Integrity remains intact(Skin Breakdown Prevention)  Description: Assess:  -Perform Joseph assessment every q 12 hours  -Clean and moisturize skin every day  -Inspect skin when repositioning, toileting, and assisting with ADLS  3/[id number]/9644 [id number] by Yolie Henao RN  Outcome: Adequate for Discharge  8/5/2022 0729 by Katherine Lama RN  Outcome: Progressing     Problem: PAIN - ADULT  Goal: Verbalizes/displays adequate comfort level or baseline comfort level  Description: Interventions:  - Encourage patient to monitor pain and request assistance  - Assess pain using appropriate pain scale  - Administer analgesics based on type and severity of pain and evaluate response  - Implement non-pharmacological measures as appropriate and evaluate response  - Consider cultural and social influences on pain and pain management  - Notify physician/advanced practitioner if interventions unsuccessful or patient reports new pain  8/5/2022 1303 by Katherine Lama RN  Outcome: Adequate for Discharge  8/5/2022 0729 by Katherine Lama RN  Outcome: Progressing     Problem: CONFUSION/THOUGHT DISTURBANCE  Goal: Thought disturbances (confusion, delirium, depression, dementia or psychosis) are managed to maintain or return to baseline mental status and functional level  Description: INTERVENTIONS:  - Assess for possible contributors to  thought disturbance, including but not limited to medications, infection, impaired vision or hearing, underlying metabolic abnormalities, dehydration, respiratory compromise,  psychiatric diagnoses and notify attending PHYSICAN/AP  - Monitor and intervene to maintain adequate nutrition, hydration, elimination, sleep and activity  - Decrease environmental stimuli, including noise as appropriate  - Provide frequent contacts to provide refocusing, direction and reassurance as needed  Approach patient calmly with eye contact and at their level    - Spring high risk fall precautions, aspiration precautions and other safety measures, as indicated  - If delirium suspected, notify physician/AP of change in condition and request immediate in-person evaluation  - Pursue consults as appropriate including Geriatric (campus dependent), OT for cognitive evaluation/activity planning, psychiatric, pastoral care, etc   8/5/2022 1303 by Alexandro Amaro RN  Outcome: Adequate for Discharge  8/5/2022 0729 by Alexandro Amaro RN  Outcome: Progressing     Problem: GASTROINTESTINAL - ADULT  Goal: Maintains adequate nutritional intake  Description: INTERVENTIONS:  - Monitor percentage of each meal consumed  - Identify factors contributing to decreased intake, treat as appropriate  - Assist with meals as needed  - Monitor I&O, weight, and lab values if indicated  - Obtain nutrition services referral as needed  8/5/2022 1303 by Alexandro Amaro RN  Outcome: Adequate for Discharge  8/5/2022 0729 by Alexandro Amaro RN  Outcome: Progressing     Problem: METABOLIC, FLUID AND ELECTROLYTES - ADULT  Goal: Electrolytes maintained within normal limits  Description: INTERVENTIONS:  - Monitor labs and assess patient for signs and symptoms of electrolyte imbalances  - Administer electrolyte replacement as ordered  - Monitor response to electrolyte replacements, including repeat lab results as appropriate  - Instruct patient on fluid and nutrition as appropriate  8/5/2022 1303 by Alexandro Amaro RN  Outcome: Adequate for Discharge  8/5/2022 0729 by Alexandro Amaro RN  Outcome: Progressing  Goal: Glucose maintained within target range  Description: INTERVENTIONS:  - Monitor Blood Glucose as ordered  - Assess for signs and symptoms of hyperglycemia and hypoglycemia  - Administer ordered medications to maintain glucose within target range  - Assess nutritional intake and initiate nutrition service referral as needed  8/5/2022 1303 by Alexandro Amaro RN  Outcome: Adequate for Discharge  8/5/2022 0729 by Alexandro Amaro RN  Outcome: Progressing     Problem: Nutrition/Hydration-ADULT  Goal: Nutrient/Hydration intake appropriate for improving, restoring or maintaining nutritional needs  Description: Monitor and assess patient's nutrition/hydration status for malnutrition   Collaborate with interdisciplinary team and initiate plan and interventions as ordered  Monitor patient's weight and dietary intake as ordered or per policy  Utilize nutrition screening tool and intervene as necessary  Determine patient's food preferences and provide high-protein, high-caloric foods as appropriate       INTERVENTIONS:  - Monitor oral intake, urinary output, labs, and treatment plans  - Assess nutrition and hydration status and recommend course of action  - Evaluate amount of meals eaten  - Assist patient with eating if necessary   - Allow adequate time for meals  - Recommend/ encourage appropriate diets, oral nutritional supplements, and vitamin/mineral supplements  - Order, calculate, and assess calorie counts as needed  - Recommend, monitor, and adjust tube feedings and TPN/PPN based on assessed needs  - Assess need for intravenous fluids  - Provide specific nutrition/hydration education as appropriate  - Include patient/family/caregiver in decisions related to nutrition  8/5/2022 1303 by Chela Saez RN  Outcome: Adequate for Discharge  8/5/2022 0729 by Chela Saez RN  Outcome: Progressing

## 2022-08-05 NOTE — TELEPHONE ENCOUNTER
I phoned the facility which the patient has been discharged to, [facility] ( [phone]) and was able to speak with the nurse on the patient's floor  As the  was not immediately available, I introduced myself and explained my role as inpatient cancer care coordinator at [facility] and provided the oncology follow up appointment information, including the address and location and the [facility] number  The nurse expressed that she would give this information to the

## 2022-08-05 NOTE — CASE MANAGEMENT
Case Management Progress Note    Patient name Janell Ball  Location [facility] 68 2 /[facility] 2 Yomi Rosado* MRN [id number]  : 1935 Date 2022       LOS (days): 27  Geometric Mean LOS (GMLOS) (days): 4 60  Days to GMLOS:-22 1        OBJECTIVE:        Current admission status: Inpatient  Preferred Pharmacy:   [address] [place] [address] [id number] [place] [address] [address]  Phone: [phone] Fax: [phone]    Primary Care Provider: No primary care provider on file  Primary Insurance: MEDICARE  Secondary Insurance: Rachelle DUNCAN     PROGRESS NOTE: Pt spoke with Patric Erickson [facility] liaison, who is expecting pt to return to facility today  Per Amie, referral information is not available via [facility]  TANIA faxed referral packet to Patric Erickson, at [facility], as requested, including referral packet, recent covid test result, and d/c summary to follow when available  Dr Eron Steele, primary RN, Lauren Myers, and pt's daughter Maximino Pinzon, aware of pt's p/u time at 1400      1231 - CM informed that pt is (+) c  Diff  CM spoke with [facility], who states that the pt will be able to return to facility as expected w/ c  Diff diagnosis  Dr Eron Steele and primary RN, Lauren Myers, aware of same  P/u remains for 1400  AVS to be included with pt's d/c packed to be given to pt's daughter at facility

## 2022-08-05 NOTE — DISCHARGE SUMMARY
[facility]  Discharge- Castillo Wilson 1935, 82 y o  male MRN: [id number]  Unit/Bed#: [address] 228-01 Encounter: [id number]  Primary Care Provider: No primary care provider on file  Date and time admitted to hospital:     Admitting Provider:  Acosta Escudero DO  Discharge Provider:  Sharron Toscano DO  Admission Date: 7/9/2022       Discharge Date: 08/05/22   LOS: 27  Primary Care Physician at Discharge: No primary care provider on file  None    HOSPITAL COURSE:  Castillo Wilson is a 82 y o  male who presented left leg redness and swelling concerning for cellulitis  His initial wound on his legs were noted to have maggots  CT of the lower extremity showed extensive subcutaneous edema  The patient was started on intravenous antibiotics completed 7 days  Biopsy was performed, it demonstrated melanoma  Oncology was consulted, was recommended he have outpatient follow-up with Dermatology to discuss further excision and treatment plan  Patient was found to be COVID-19 positive, he was asymptomatic and completed a 10 day isolation course  Unfortunately patient did developed diarrhea on hospital day number 24  He was found to have C diff colitis  He was started on oral vancomycin and she completed 10-14 day course  Patient remained in the hospital for the further disposition planning  He was felt not to be able to make medical decisions and family members opted for in the be discharged to short-term rehab  The time of discharge patient is tolerating oral diet is out acute complaint and medically optimized for discharge  All questions answered to the patient's satisfaction he was discharged      DISCHARGE DIAGNOSES  * Cellulitis  Assessment & Plan  Presented with left leg cellulitis, surrounding wounds, and growth on leg  · Maggots were found in the wound upon presentation to the ED  · Ct lower extremity: "Extensive subcutaneous edema likely representing cellulitis  Although evaluation for abscess is limited in the absence images contrast no obvious discrete collection is identified  Dermal soft tissue lesion seen in association with the posterior medial calf "  · Completed 7 days of IV antibiotics  · Blood cultures negative thus far    C  difficile colitis  Assessment & Plan  Positive  Start vancomycin 125 q 6 for the next 10 days    Depression  Assessment & Plan  Remeron started per psych recommendations    Malignant melanoma of leg, left [facility])  Assessment & Plan  Pathology showing Melanoma  Discussed case with Oncology Keaton Conley MD   · Recommendations were: Pathology noted  F/u to be placed with Dr Emma Traore for melanoma medical oncology care  · CT CAP without any mets  · Plastics consult per surgery recommendations for complete excision to be done outpatient  · Dermatology outpatient follow-up for skin exam to assess for other sites of potential melanoma  COVID-19  Assessment & Plan  Detected to be COVID-19 positive on 07/20/2022  Asymptomatic  Now off isolation  Encounter for competency evaluation  Assessment & Plan  · Neuropsychiatry consultation appreciated, at this point patient does not have capacity to make informed medical decisions    Hypertension  Assessment & Plan  Controlled  - Continue Amlodipine    Type 2 diabetes mellitus with hyperglycemia, without long-term current use of insulin (HCC)  Assessment & Plan  New onset diabetes mellitus  Unclear with the this was undetected and detected as inpt here or this was precipitated by COVID infection  · Hemoglobin A1c checked and is 8 8  · Monitor blood sugars while admitted  · Patient is eating quite poorly, will start Magic Cup    Leg mass  Assessment & Plan  · Seen and evaluated by General surgery  · Appears mass has been there for quite some time  · Status post bedside incisional biopsy 07/13/2022  Path with A  Skin, left lower leg, incisional biopsy:  Melanoma, ulcerated  See synoptic report and note    Will need outpatient follow-up- family aware of finding      CONSULTING PROVIDERS   IP CONSULT TO ACUTE CARE SURGERY  IP CONSULT TO PHARMACY  IP CONSULT TO PLASTIC SURGERY  IP CONSULT TO PSYCHIATRY    PROCEDURES PERFORMED  * No surgery found *    RADIOLOGY RESULTS  XR tibia fibula 2 views LEFT    Result Date: 7/10/2022  Narrative: LEFT TIBIA AND FIBULA INDICATION:   infection  COMPARISON:  None VIEWS:  XR TIBIA FIBULA 2 VW LEFT Images: 5 FINDINGS: There is no acute fracture or dislocation  Arthritic changes knee No lytic or blastic osseous lesion  Mineralized vasculature seen Focal skin-based lesion seen in the posterior medial proximal leg    Impression: No acute osseous abnormality  Focal skin-based lesion in the posterior medial proximal leg, correlate with the mass Workstation performed: [id number]     CT chest abdomen pelvis w contrast    Impression: No findings of metastatic disease in the chest, abdomen and pelvis  Few small opacities in the periphery of the right lung may represent atelectasis, though 3 month follow-up is advised to confirm benignity  US kidney and bladder    Result Date: 7/11/2022  Impression: 1  Normal-appearing kidneys and bladder  2   Hepatic steatosis  Workstation performed: [id number]     CT lower extremity wo contrast right    Result Date: 7/9/2022    Impression: Extensive subcutaneous edema likely representing cellulitis  Although evaluation for abscess is limited in the absence images contrast no obvious discrete collection is identified  Dermal soft tissue lesion seen in association with the posterior medial  calf         LABS      Cultures:         Invalid input(s): URIBILINOGEN        Results from last 7 days   Lab Units 08/04/22  1439 08/04/22  1057   INFLUENZA A PCR  Negative  --    C DIFF TOXIN B BY PCR   --  Positive*       PHYSICAL EXAM:  Vitals:   Blood Pressure: 138/73 (08/05/22 0714)  Pulse: 95 (08/05/22 0714)  Temperature: 98 4 °F (36 9 °C) (08/05/22 0714)  Temp Source: Oral (08/05/22 0714)  Respirations: 17 (08/05/22 0714)  Height: 5' 9" (175 3 cm) (07/09/22 1929)  Weight - Scale: 91 5 kg (201 lb 11 5 oz) (07/09/22 1929)  SpO2: 93 % (08/05/22 0714)      General: well appearing, no acute distress  HEENT: atraumatic, PERRLA, moist mucosa, normal pharynx, normal tonsils and adenoids, normal tongue, no fluid in sinuses  Neck: Trachea midline, no carotid bruit, no masses  Respiratory: normal chest wall expansion, CTA B, no r/r/w, no rubs  Cardiovascular: RRR, no m/r/g, Normal S1 and S2  Abdomen: Soft, non-tender, non-distended, normal bowel sounds in all quadrants, no hepatosplenomegaly, no tympany  Rectal: deferred  Musculoskeletal: normal ROM in upper and lower extremities  Integumentary: warm, dry, and pink, with no rash, purpura, or petechia  Heme/Lymph: no lymphadenopathy, no bruises  Neurological: Cranial Nerves II-XII grossly intact, no tics, normal sensation to pressure and light touch  Psychiatric: cooperative with normal mood, affect, and cognition      Discharge Disposition:  Short-term rehab    Test Results Pending at Discharge:           Medications   · Summary of Medication Adjustments made as a result of this hospitalization:  See discharge list  · Medication Dosing Tapers - Please refer to Discharge Medication List for details on any medication dosing tapers (if applicable to patient)  · Discharge Medication List: See after visit summary for reconciled discharge medications       Diet restrictions:         Diet Orders   (From admission, onward)             Start     Ordered    08/04/22 1440  Dietary nutrition supplements  Once        Question Answer Comment   Select Supplement: Magic Cup Vanilla    Frequency At Bedtime        08/04/22 1439    08/04/22 1439  Dietary nutrition supplements  Once        Question Answer Comment   Select Supplement: Magic Cup Chocolate    Frequency Lunch        08/04/22 1439    07/28/22 1533  Dietary nutrition supplements  Once        Question Answer Comment   Select Supplement: Glucerna-Chocolate    Frequency Dinner        07/28/22 1532    07/28/22 1533  Dietary nutrition supplements  Once        Question Answer Comment   Select Supplement: Glucerna-Strawberry    Frequency Breakfast        07/28/22 1532    07/09/22 2210  Diet Tyson/CHO Controlled; Consistent Carbohydrate Diet Level 2 (5 carb servings/75 grams CHO/meal)  Diet effective now        References:    Nutrtion Support Algorithm Enteral vs  Parenteral   Question Answer Comment   Diet Type Tyson/CHO Controlled    Tyson/CHO Controlled Consistent Carbohydrate Diet Level 2 (5 carb servings/75 grams CHO/meal)    RD to adjust diet per protocol? Yes        07/09/22 2213              Activity restrictions: No strenuous activity  Discharge Condition: good    Outpatient Follow-Up and Discharge Instructions  See after visit summary section titled Discharge Instructions for information provided to patient and family  Code Status: Level 1 - Full Code  Discharge Statement   I spent 35 minutes discharging the patient  This time was spent on the day of discharge  Greater than 50% of total time was spent with the patient and / or family counseling and / or coordination of care  ** Please Note: This note was completed in part utilizing [employer] Direct Software  Grammatical errors, random word insertions, spelling mistakes, and incomplete sentences may be an occasional consequence of this system secondary to software limitations, ambient noise, and hardware issues  If you have any questions or concerns about the content, text, or information contained within the body of this dictation, please contact the provider for clarification  **

## 2022-08-05 NOTE — PROGRESS NOTES
[facility]  Progress Note - Monico Weems 1935, 82 y o  male MRN: [id number]  Unit/Bed#: [place] [address] -01 Encounter: [id number]  Primary Care Provider: No primary care provider on file  Date and time admitted to hospital: 7/9/2022 11:02 AM    * Cellulitis  Assessment & Plan  Presented with left leg cellulitis, surrounding wounds, and growth on leg  · Maggots were found in the wound upon presentation to the ED  · Ct lower extremity: "Extensive subcutaneous edema likely representing cellulitis  Although evaluation for abscess is limited in the absence images contrast no obvious discrete collection is identified  Dermal soft tissue lesion seen in association with the posterior medial calf "  · Completed 7 days of IV antibiotics  · Blood cultures negative thus far    Depression  Assessment & Plan  Remeron started per psych recommendations    Malignant melanoma of leg, left [facility])  Assessment & Plan  Pathology showing Melanoma  Discussed case with Oncology Jeanine Ansari MD   · Recommendations were: Pathology noted  F/u to be placed with Dr Verduzco Stage for melanoma medical oncology care  · CT CAP without any mets  · Plastics consult per surgery recommendations for complete excision to be done outpatient  · Dermatology outpatient follow-up for skin exam to assess for other sites of potential melanoma  COVID-19  Assessment & Plan  Detected to be COVID-19 positive on 07/20/2022  Asymptomatic  Now off isolation  Encounter for competency evaluation  Assessment & Plan  · Neuropsychiatry consultation appreciated, at this point patient does not have capacity to make informed medical decisions    Hypertension  Assessment & Plan  Controlled  - Continue Amlodipine    Type 2 diabetes mellitus with hyperglycemia, without long-term current use of insulin (HCC)  Assessment & Plan  New onset diabetes mellitus   Unclear with the this was undetected and detected as inpt here or this was precipitated by COVID infection  · Hemoglobin A1c checked and is 8 8  · Monitor blood sugars while admitted  · Patient is eating quite poorly, will start Magic Cup    Leg mass  Assessment & Plan  · Seen and evaluated by General surgery  · Appears mass has been there for quite some time  · Status post bedside incisional biopsy 07/13/2022  · Pathology pending  Definitive treatment pending biopsy results  · Pain control      [facility] Internal Medicine Progress Note  Patient: Corinne Gay 82 y o  male   MRN: [id number]  PCP: No primary care provider on file  Unit/Bed#: [facility] -01 Encounter: [id number]  Date Of Visit: 08/04/22    Assessment:    Principal Problem:    Cellulitis  Active Problems:    Leg mass    Type 2 diabetes mellitus with hyperglycemia, without long-term current use of insulin ([facility] [address] )    Hypertension    Encounter for competency evaluation    COVID-19    Malignant melanoma of leg, left ([facility] [address] )    Depression      Plan:    · Continue current medical management  · Discharge planning  · C diff testing underway       VTE Pharmacologic Prophylaxis:   Pharmacologic: Heparin  Mechanical VTE Prophylaxis in Place: Yes    Patient Centered Rounds: I have performed bedside rounds with nursing staff today  Discussions with Specialists or Other Care Team Provider:  Clear     Education and Discussions with Family / Patient:  No family to update    Time Spent for Care: 30 minutes  More than 50% of total time spent on counseling and coordination of care as described above  Current Length of Stay: 26 day(s)    Current Patient Status: Inpatient   Certification Statement: The patient will continue to require additional inpatient hospital stay due to Rehab placement    Discharge Plan / Estimated Discharge Date:  Tomorrow    Code Status: Level 1 - Full Code      Subjective:   Seen examined, no acute complaints  Noted by nursing of diarrhea earlier  Denies any nausea vomiting    No abdominal pain    A complete and comprehensive 14 point organ system review has been performed and all other systems are negative other than stated above  Objective:     Vitals:   Temp (24hrs), Av 2 °F (36 8 °C), Min:97 9 °F (36 6 °C), Max:98 3 °F (36 8 °C)    Temp:  [97 9 °F (36 6 °C)-98 3 °F (36 8 °C)] 97 9 °F (36 6 °C)  HR:  [] 101  Resp:  [15-20] 15  BP: (139-140)/(72-74) 140/73  SpO2:  [94 %-96 %] 96 %  Body mass index is 29 79 kg/m²  Input and Output Summary (last 24 hours): Intake/Output Summary (Last 24 hours) at 2022  Last data filed at 2022 1301  Gross per 24 hour   Intake 450 ml   Output 325 ml   Net 125 ml       Physical Exam:     General: well appearing, no acute distress  HEENT: atraumatic, PERRLA, moist mucosa, normal pharynx, normal tonsils and adenoids, normal tongue, no fluid in sinuses  Neck: Trachea midline, no carotid bruit, no masses  Respiratory: normal chest wall expansion, CTA B, no r/r/w, no rubs  Cardiovascular: RRR, no m/r/g, Normal S1 and S2  Abdomen: Soft, non-tender, non-distended, normal bowel sounds in all quadrants, no hepatosplenomegaly, no tympany  Rectal: deferred  Musculoskeletal: normal ROM in upper and lower extremities  Integumentary: warm, dry, and pink, with no rash, purpura, or petechia  Heme/Lymph: no lymphadenopathy, no bruises  Neurological: Cranial Nerves II-XII grossly intact, no tics, normal sensation to pressure and light touch  Psychiatric: cooperative with normal mood, affect, and cognition      Additional Data:     Labs: Invalid input(s): LABALBU        * I Have Reviewed All Lab Data Listed Above  * Additional Pertinent Lab Tests Reviewed:  David [facility] Admission Reviewed    Imaging:    Imaging Reports Reviewed Today Include:  No new imaging  Imaging Personally Reviewed by Myself Includes:  No new imaging    Recent Cultures (last 7 days):           Last 24 Hours Medication List:   Current Facility-Administered Medications   Medication Dose Route Frequency Provider Last Rate    acetaminophen  650 mg Oral Q6H PRN Vanessa Kim DO      al mag oxide-diphenhydramine-lidocaine viscous  10 mL Swish & Swallow Q6H PRN Natan Billy MD      amLODIPine  10 mg Oral Daily Palak Rausch MD      aspirin  81 mg Oral Daily Palak Rausch MD      butalbital-acetaminophen-caffeine  1 tablet Oral Q4H PRN Natan Billy MD      heparin (porcine)  5,000 Units Subcutaneous [facility] Vanessa Kim DO      mirtazapine  15 mg Oral HS Edgar Odonnell MD      oxyCODONE  2 5 mg Oral Q8H PRN Doyle Collet, DO      potassium chloride  20 mEq Oral Daily Marrian Cabot, MD          Today, Patient Was Seen By: Tiarra Dominguez DO    ** Please Note: This note was completed in part utilizing M-Modal Fluency Direct Software  Grammatical errors, random word insertions, spelling mistakes, and incomplete sentences may be an occasional consequence of this system secondary to software limitations, ambient noise, and hardware issues  If you have any questions or concerns about the content, text, or information contained within the body of this dictation, please contact the provider for clarification   **

## 2022-08-05 NOTE — ASSESSMENT & PLAN NOTE
Pathology showing Melanoma  Discussed case with Oncology Deric Almendarez MD   · Recommendations were: Pathology noted  F/u to be placed with Dr Jonathan Conley for melanoma medical oncology care  · CT CAP without any mets  · Plastics consult per surgery recommendations for complete excision to be done outpatient  · Dermatology outpatient follow-up for skin exam to assess for other sites of potential melanoma

## 2022-08-05 NOTE — PLAN OF CARE
Problem: Potential for Falls  Goal: Patient will remain free of falls  Description: INTERVENTIONS:  - Educate patient/family on patient safety including physical limitations  - Instruct patient to call for assistance with activity   - Consult OT/PT to assist with strengthening/mobility   - Keep Call bell within reach  - Keep bed low and locked with side rails adjusted as appropriate  - Keep care items and personal belongings within reach  - Initiate and maintain comfort rounds  - Make Fall Risk Sign visible to staff  - Offer Toileting every 2 Hours, in advance of need  - Initiate/Maintain bed/chair alarm  - Obtain necessary fall risk management equipment:bed/chair alarm, call bell, walker, gripper socks, rounds  - Apply yellow socks and bracelet for high fall risk patients  - Consider moving patient to room near nurses station  Outcome: Progressing     Problem: Prexisting or High Potential for Compromised Skin Integrity  Goal: Skin integrity is maintained or improved  Description: INTERVENTIONS:  - Identify patients at risk for skin breakdown  - Assess and monitor skin integrity  - Assess and monitor nutrition and hydration status  - Monitor labs   - Assess for incontinence   - Instruct patient to turn and repostion every 2 hours while in bed and weight shift every 15 minutes while in chair  - Assist with mobility/ambulation  - Relieve pressure over bony prominences  - Avoid friction and shearing  - Provide appropriate hygiene as needed including keeping skin clean and dry  - Evaluate need for skin moisturizer/barrier cream  - Collaborate with interdisciplinary team   - Patient/family teaching  - Consider wound care consult   Outcome: Progressing     Problem: MOBILITY - ADULT  Goal: Maintain or return to baseline ADL function  Description: INTERVENTIONS:  -  Assess patient's ability to carry out ADLs; assess patient's baseline for ADL function and identify physical deficits which impact ability to perform ADLs (bathing, care of mouth/teeth, toileting, grooming, dressing, etc )  - Assess/evaluate cause of self-care deficits   - Assess range of motion  - Assess patient's mobility; develop plan if impaired  - Assess patient's need for assistive devices and provide as appropriate  - Encourage maximum independence but intervene and supervise when necessary  - Involve family in performance of ADLs  - Assess for home care needs following discharge   - Consider OT consult to assist with ADL evaluation and planning for discharge  - Provide patient education as appropriate  Outcome: Progressing  Goal: Maintains/Returns to pre admission functional level  Description: INTERVENTIONS:  - Perform BMAT or MOVE assessment daily    - Set and communicate daily mobility goal to care team and patient/family/caregiver     - Collaborate with rehabilitation services on mobility goals if consulted  - Out of bed to chair 3 times a day   - Out of bed for meals 3 times a day  - Out of bed for toileting  - Record patient progress and toleration of activity level   Outcome: Progressing     Problem: INFECTION - ADULT  Goal: Absence or prevention of progression during hospitalization  Description: INTERVENTIONS:  - Assess and monitor for signs and symptoms of infection  - Monitor lab/diagnostic results  - Monitor all insertion sites, i e  indwelling lines, tubes, and drains  - Monitor endotracheal if appropriate and nasal secretions for changes in amount and color  - [place] appropriate cooling/warming therapies per order  - Administer medications as ordered  - Instruct and encourage patient and family to use good hand hygiene technique  - Identify and instruct in appropriate isolation precautions for identified infection/condition  Outcome: Progressing     Problem: DISCHARGE PLANNING  Goal: Discharge to home or other facility with appropriate resources  Description: INTERVENTIONS:  - Identify barriers to discharge w/patient and caregiver  - Arrange for needed discharge resources and transportation as appropriate  - Identify discharge learning needs (meds, wound care, etc )  - Arrange for interpretive services to assist at discharge as needed  - Refer to Case Management Department for coordinating discharge planning if the patient needs post-hospital services based on physician/advanced practitioner order or complex needs related to functional status, cognitive ability, or social support system  Outcome: Progressing     Problem: SKIN/TISSUE INTEGRITY - ADULT  Goal: Incision(s), wounds(s) or drain site(s) healing without S/S of infection  Description: INTERVENTIONS  - Assess and document dressing, incision, wound bed, drain sites and surrounding tissue  - Provide patient and family education  - Perform skin care/dressing changes every other day  Outcome: Progressing  Goal: Skin Integrity remains intact(Skin Breakdown Prevention)  Description: Assess:  -Perform Joseph assessment every q 12 hours  -Clean and moisturize skin every day  -Inspect skin when repositioning, toileting, and assisting with ADLS  Outcome: Progressing     Problem: PAIN - ADULT  Goal: Verbalizes/displays adequate comfort level or baseline comfort level  Description: Interventions:  - Encourage patient to monitor pain and request assistance  - Assess pain using appropriate pain scale  - Administer analgesics based on type and severity of pain and evaluate response  - Implement non-pharmacological measures as appropriate and evaluate response  - Consider cultural and social influences on pain and pain management  - Notify physician/advanced practitioner if interventions unsuccessful or patient reports new pain  Outcome: Progressing     Problem: CONFUSION/THOUGHT DISTURBANCE  Goal: Thought disturbances (confusion, delirium, depression, dementia or psychosis) are managed to maintain or return to baseline mental status and functional level  Description: INTERVENTIONS:  - Assess for possible contributors to  thought disturbance, including but not limited to medications, infection, impaired vision or hearing, underlying metabolic abnormalities, dehydration, respiratory compromise,  psychiatric diagnoses and notify attending PHYSICAN/AP  - Monitor and intervene to maintain adequate nutrition, hydration, elimination, sleep and activity  - Decrease environmental stimuli, including noise as appropriate  - Provide frequent contacts to provide refocusing, direction and reassurance as needed  Approach patient calmly with eye contact and at their level    - [place] high risk fall precautions, aspiration precautions and other safety measures, as indicated  - If delirium suspected, notify physician/AP of change in condition and request immediate in-person evaluation  - Pursue consults as appropriate including Geriatric (campus dependent), OT for cognitive evaluation/activity planning, psychiatric, pastoral care, etc   Outcome: Progressing     Problem: GASTROINTESTINAL - ADULT  Goal: Maintains adequate nutritional intake  Description: INTERVENTIONS:  - Monitor percentage of each meal consumed  - Identify factors contributing to decreased intake, treat as appropriate  - Assist with meals as needed  - Monitor I&O, weight, and lab values if indicated  - Obtain nutrition services referral as needed  Outcome: Progressing     Problem: METABOLIC, FLUID AND ELECTROLYTES - ADULT  Goal: Electrolytes maintained within normal limits  Description: INTERVENTIONS:  - Monitor labs and assess patient for signs and symptoms of electrolyte imbalances  - Administer electrolyte replacement as ordered  - Monitor response to electrolyte replacements, including repeat lab results as appropriate  - Instruct patient on fluid and nutrition as appropriate  Outcome: Progressing  Goal: Glucose maintained within target range  Description: INTERVENTIONS:  - Monitor Blood Glucose as ordered  - Assess for signs and symptoms of hyperglycemia and hypoglycemia  - Administer ordered medications to maintain glucose within target range  - Assess nutritional intake and initiate nutrition service referral as needed  Outcome: Progressing     Problem: Nutrition/Hydration-ADULT  Goal: Nutrient/Hydration intake appropriate for improving, restoring or maintaining nutritional needs  Description: Monitor and assess patient's nutrition/hydration status for malnutrition  Collaborate with interdisciplinary team and initiate plan and interventions as ordered  Monitor patient's weight and dietary intake as ordered or per policy  Utilize nutrition screening tool and intervene as necessary  Determine patient's food preferences and provide high-protein, high-caloric foods as appropriate       INTERVENTIONS:  - Monitor oral intake, urinary output, labs, and treatment plans  - Assess nutrition and hydration status and recommend course of action  - Evaluate amount of meals eaten  - Assist patient with eating if necessary   - Allow adequate time for meals  - Recommend/ encourage appropriate diets, oral nutritional supplements, and vitamin/mineral supplements  - Order, calculate, and assess calorie counts as needed  - Recommend, monitor, and adjust tube feedings and TPN/PPN based on assessed needs  - Assess need for intravenous fluids  - Provide specific nutrition/hydration education as appropriate  - Include patient/family/caregiver in decisions related to nutrition  Outcome: Progressing

## 2022-08-10 ENCOUNTER — CONSULT (OUTPATIENT)
Dept: HEMATOLOGY ONCOLOGY | Facility: CLINIC | Age: 87
End: 2022-08-10
Payer: MEDICARE

## 2022-08-10 VITALS
TEMPERATURE: 96.9 F | DIASTOLIC BLOOD PRESSURE: 80 MMHG | BODY MASS INDEX: 25.77 KG/M2 | RESPIRATION RATE: 16 BRPM | OXYGEN SATURATION: 93 % | HEART RATE: 89 BPM | WEIGHT: 174 LBS | SYSTOLIC BLOOD PRESSURE: 130 MMHG | HEIGHT: 69 IN

## 2022-08-10 DIAGNOSIS — C43.72 MALIGNANT MELANOMA OF LEG, LEFT (HCC): Primary | ICD-10-CM

## 2022-08-10 PROCEDURE — 99214 OFFICE O/P EST MOD 30 MIN: CPT | Performed by: INTERNAL MEDICINE

## 2022-08-10 NOTE — LETTER
August 11, 2022     [facility] [id number]  [address] [address]    Patient: Brittany Contreras   YOB: 1935   Date of Visit: 8/10/2022       Dear Dr Gela Capellan: Thank you for referring Brittany Contreras to me for evaluation  Below are my notes for this consultation  If you have questions, please do not hesitate to call me  I look forward to following your patient along with you  Sincerely,        Crystal Palomo MD        CC: No Recipients  Crystal Palomo MD  8/11/2022  7:48 PM  Sign when Signing Visit  [address] [place] [address]  [phone]     Date of Visit: 8/10/2022  Name: Brittany Contreras   YOB: 1935        Subjective    VISIT DIAGNOSIS:  Diagnoses and all orders for this visit:    Malignant melanoma of leg, left (HCC)  -     CBC and differential; Future  -     Comprehensive metabolic panel; Future  -     LD,Blood; Future  -     Ambulatory Referral to Surgical Oncology; Future        Oncology History   Malignant melanoma of leg, left ([facility] [address] )   7/13/2022 -  Cancer Staged    Staging form: Melanoma of the Skin, AJCC 8th Edition  - Clinical stage from 7/13/2022: Stage IIB (cT3b, cN0, cM0) - Signed by Crystal Palomo MD on 8/11/2022 7/25/2022 Initial Diagnosis    Malignant melanoma of leg, left ([facility] [address] )        Cancer Staging  Malignant melanoma of leg, left ([facility] [address] )  Staging form: Melanoma of the Skin, AJCC 8th Edition  - Clinical stage from 7/13/2022: Stage IIB (cT3b, cN0, cM0) - Signed by Crystal Palomo MD on 8/11/2022       HISTORY OF PRESENT ILLNESS: Brittany Contreras is a 82 y o  male  who was recently diagnosed with melanoma identified on recent hospital administration here for follow-up  He is seen in consultation and history is obtained from him and review of records      He initially presented to the hospital with left leg redness and swelling with concern for cellulitis  It was found that the wound on his left leg did have maggots  CT scan demonstrated subcutaneous edema  He was started on antibiotics any did undergo an excisional biopsy of the wound on his left leg  Biopsy on 07/13/2022 demonstrated a 2 6 mm Breslow thickness melanoma which was ulcerated; 9 mitoses per mm2; John level for; positive margins  He underwent a CT chest abdomen and pelvis on 07/26/2022 which was negative for metastatic melanoma, however there are some peripheral right lung opacities consistent with atelectasis but follow-up is recommended with repeat CT scan  He states that the lesion had been on his leg for some number of years  At the time when he initially noted it he thought was a wart and it was about a quarter of the size as it was when he presented to the hospital   He states he kept putting a liquid/ointment on it to remove it  With application of this liquid the lesion got bigger and stat of smaller and got worse  He states that with there are number of years  Other than getting bigger he states there were no changes to it  He denies any itching, pain, or bleeding of the lesion  He describes having increased sun exposure  He denies any sunburns  He denies any blistering sunburns  He denies tanning bed use  He denies family history of melanoma, breast cancer, ovarian cancer, and pancreatic cancer  He states he had brown hair when he was younger, he has brown eyes, is [place] descent  He has not had a colonoscopy any does not follow with a PCP  He quit smoking 55 years ago  He did smoke for 50 years about 1/4 pack per day  He denies alcohol use  Denies drug use  REVIEW OF SYSTEMS:  Review of Systems   Constitutional: Negative for appetite change, fatigue, fever and unexpected weight change  HENT:   Negative for lump/mass  Eyes: Negative for icterus  Respiratory: Negative for cough, shortness of breath and wheezing  Cardiovascular: Negative for leg swelling  Gastrointestinal: Negative for abdominal pain, constipation, diarrhea, nausea and vomiting  Genitourinary: Negative for difficulty urinating and hematuria  Musculoskeletal: Negative for arthralgias, gait problem and myalgias  Skin: Positive for wound (left leg; healed)  Negative for itching and rash  No new, changing, or concerning lesions  Neurological: Negative for extremity weakness, gait problem, headaches, light-headedness and numbness  Hematological: Negative for adenopathy          MEDICATIONS:    Current Outpatient Medications:     amLODIPine (NORVASC) 10 mg tablet, Take 1 tablet (10 mg total) by mouth daily, Disp: 30 tablet, Rfl: 0    aspirin (ECOTRIN LOW STRENGTH) 81 mg EC tablet, Take 1 tablet (81 mg total) by mouth daily, Disp: 30 tablet, Rfl: 0    mirtazapine (REMERON) 15 mg tablet, Take 1 tablet (15 mg total) by mouth daily at bedtime, Disp: 30 tablet, Rfl: 0    vancomycin (VANCOCIN) 50mg/mL SOLN, Take 2 5 mL (125 mg total) by mouth every 6 (six) hours for 40 doses, Disp: 100 mL, Rfl: 0     ALLERGIES:  Allergies   Allergen Reactions    Amphetamine Rash     compounds containing amphetamine        ACTIVE PROBLEMS:  Patient Active Problem List   Diagnosis    Cellulitis    Leg mass    Type 2 diabetes mellitus with hyperglycemia, without long-term current use of insulin (HCC)    Hypertension    Encounter for competency evaluation    COVID-19    Malignant melanoma of leg, left (HCC)    Depression    C  difficile colitis          PAST MEDICAL HISTORY:   Past Medical History:   Diagnosis Date    Hx of heart artery stent         PAST SURGICAL HISTORY:  Past Surgical History:   Procedure Laterality Date    BACK SURGERY          SOCIAL HISTORY:  Social History     Socioeconomic History    Marital status: Single     Spouse name: None    Number of children: None    Years of education: None    Highest education level: None Occupational History    None   Tobacco Use    Smoking status: Former Smoker    Smokeless tobacco: Never Used   Substance and Sexual Activity    Alcohol use: Never    Drug use: Never    Sexual activity: None   Other Topics Concern    None   Social History Narrative    None     Social Determinants of Health     Financial Resource Strain: Not on file   Food Insecurity: No Food Insecurity    Worried About Running Out of Food in the Last Year: Never true    Hyun of Food in the Last Year: Never true   Transportation Needs: No Transportation Needs    Lack of Transportation (Medical): No    Lack of Transportation (Non-Medical): No   Physical Activity: Not on file   Stress: Not on file   Social Connections: Not on file   Intimate Partner Violence: Not on file   Housing Stability: Low Risk     Unable to Pay for Housing in the Last Year: No    Number of Places Lived in the Last Year: 1    Unstable Housing in the Last Year: No        FAMILY HISTORY:  No family history on file  Objective    PHYSICAL EXAMINATION:   Blood pressure 130/80, pulse 89, temperature (!) 96 9 °F (36 1 °C), temperature source Temporal, resp  rate 16, height 5' 9" (1 753 m), weight 78 9 kg (174 lb), SpO2 93 %  Pain Score: 0-No pain     ECOG Performance Status    Flowsheet Row Most Recent Value   ECOG Performance Status 1 - Restricted in physically strenuous activity but ambulatory and able to carry out work of a light or sedentary nature, e g , light house work, office work            Physical Exam  Constitutional:       General: He is not in acute distress  Appearance: Normal appearance  He is not toxic-appearing  HENT:      Mouth/Throat:      Mouth: Mucous membranes are moist       Pharynx: Oropharynx is clear  Eyes:      General: No scleral icterus  Cardiovascular:      Rate and Rhythm: Normal rate and regular rhythm  Pulses: Normal pulses  Heart sounds: No murmur heard  No friction rub  No gallop  Pulmonary:      Effort: Pulmonary effort is normal  No respiratory distress  Breath sounds: Normal breath sounds  No wheezing or rales  Chest:   Breasts:      Right: No axillary adenopathy or supraclavicular adenopathy  Left: No axillary adenopathy or supraclavicular adenopathy  Abdominal:      General: There is no distension  Palpations: There is no mass  Tenderness: There is no abdominal tenderness  There is no rebound  Musculoskeletal:         General: No swelling or tenderness  Right lower leg: No edema  Left lower leg: No edema  Lymphadenopathy:      Head:      Right side of head: No submandibular, preauricular or posterior auricular adenopathy  Left side of head: No submandibular, preauricular or posterior auricular adenopathy  Cervical: No cervical adenopathy  Right cervical: No superficial or posterior cervical adenopathy  Left cervical: No superficial or posterior cervical adenopathy  Upper Body:      Right upper body: No supraclavicular or axillary adenopathy  Left upper body: No supraclavicular or axillary adenopathy  Lower Body: No right inguinal adenopathy  No left inguinal adenopathy  Skin:     Findings: Lesion (healing left leg biopsy site) present  No rash  Comments: Healing left leg biopsy site   Neurological:      General: No focal deficit present  Mental Status: He is alert and oriented to person, place, and time  Psychiatric:         Mood and Affect: Mood normal          Behavior: Behavior normal          Thought Content: Thought content normal          Judgment: Judgment normal          I reviewed lab data in the chart      WBC   Date Value Ref Range Status   07/23/2022 7 01 4 31 - 10 16 Thousand/uL Final   07/14/2022 10 03 4 31 - 10 16 Thousand/uL Final   07/12/2022 8 43 4 31 - 10 16 Thousand/uL Final     Hemoglobin   Date Value Ref Range Status   07/23/2022 15 6 12 0 - 17 0 g/dL Final   07/14/2022 16 3 12 0 - 17 0 g/dL Final   07/12/2022 15 3 12 0 - 17 0 g/dL Final     Platelets   Date Value Ref Range Status   07/23/2022 212 149 - 390 Thousands/uL Final   07/14/2022 298 149 - 390 Thousands/uL Final   07/12/2022 256 149 - 390 Thousands/uL Final     MCV   Date Value Ref Range Status   07/23/2022 90 82 - 98 fL Final   07/14/2022 91 82 - 98 fL Final   07/12/2022 90 82 - 98 fL Final      Potassium   Date Value Ref Range Status   07/27/2022 3 5 3 5 - 5 3 mmol/L Final   07/23/2022 3 4 (L) 3 5 - 5 3 mmol/L Final   07/14/2022 3 5 3 5 - 5 3 mmol/L Final     Chloride   Date Value Ref Range Status   07/27/2022 101 96 - 108 mmol/L Final   07/23/2022 101 96 - 108 mmol/L Final   07/14/2022 105 100 - 108 mmol/L Final     CO2   Date Value Ref Range Status   07/27/2022 25 21 - 32 mmol/L Final   07/23/2022 26 21 - 32 mmol/L Final   07/14/2022 28 21 - 32 mmol/L Final     BUN   Date Value Ref Range Status   07/27/2022 13 5 - 25 mg/dL Final   07/23/2022 12 5 - 25 mg/dL Final   07/14/2022 9 5 - 25 mg/dL Final     Creatinine   Date Value Ref Range Status   07/27/2022 0 80 0 60 - 1 30 mg/dL Final     Comment:     Standardized to IDMS reference method   07/23/2022 0 84 0 60 - 1 30 mg/dL Final     Comment:     Standardized to IDMS reference method   07/14/2022 0 99 0 60 - 1 30 mg/dL Final     Comment:     Standardized to IDMS reference method     Glucose   Date Value Ref Range Status   07/27/2022 103 65 - 140 mg/dL Final     Comment:     If the patient is fasting, the ADA then defines impaired fasting glucose as > 100 mg/dL and diabetes as > or equal to 123 mg/dL  Specimen collection should occur prior to Sulfasalazine administration due to the potential for falsely depressed results  Specimen collection should occur prior to Sulfapyridine administration due to the potential for falsely elevated results     07/23/2022 103 65 - 140 mg/dL Final     Comment:     If the patient is fasting, the ADA then defines impaired fasting glucose as > 100 mg/dL and diabetes as > or equal to 123 mg/dL  Specimen collection should occur prior to Sulfasalazine administration due to the potential for falsely depressed results  Specimen collection should occur prior to Sulfapyridine administration due to the potential for falsely elevated results  07/14/2022 119 65 - 140 mg/dL Final     Comment:     If the patient is fasting, the ADA then defines impaired fasting glucose as > 100 mg/dL and diabetes as > or equal to 123 mg/dL  Specimen collection should occur prior to Sulfasalazine administration due to the potential for falsely depressed results  Specimen collection should occur prior to Sulfapyridine administration due to the potential for falsely elevated results  Calcium   Date Value Ref Range Status   07/27/2022 8 6 8 3 - 10 1 mg/dL Final   07/23/2022 8 3 8 3 - 10 1 mg/dL Final   07/14/2022 8 5 8 3 - 10 1 mg/dL Final     Albumin   Date Value Ref Range Status   07/09/2022 3 5 3 5 - 5 0 g/dL Final   04/10/2022 3 0 (L) 3 5 - 5 0 g/dL Final     Total Bilirubin   Date Value Ref Range Status   07/09/2022 0 96 0 20 - 1 00 mg/dL Final     Comment:     Use of this assay is not recommended for patients undergoing treatment with eltrombopag due to the potential for falsely elevated results  04/10/2022 0 77 0 20 - 1 00 mg/dL Final     Comment:     Use of this assay is not recommended for patients undergoing treatment with eltrombopag due to the potential for falsely elevated results  Alkaline Phosphatase   Date Value Ref Range Status   07/09/2022 79 46 - 116 U/L Final   04/10/2022 93 46 - 116 U/L Final     AST   Date Value Ref Range Status   07/09/2022 19 5 - 45 U/L Final     Comment:     Specimen collection should occur prior to Sulfasalazine administration due to the potential for falsely depressed results      04/10/2022 44 5 - 45 U/L Final     Comment:     Specimen collection should occur prior to Sulfasalazine administration due to the potential for falsely depressed results  ALT   Date Value Ref Range Status   07/09/2022 22 12 - 78 U/L Final     Comment:     Specimen collection should occur prior to Sulfasalazine administration due to the potential for falsely depressed results  04/10/2022 28 12 - 78 U/L Final     Comment:     Specimen collection should occur prior to Sulfasalazine administration due to the potential for falsely depressed results  No results found for: LDH  No results found for: TSH  No results found for: G0XVXVG   No results found for: FREET4      RECENT IMAGING:  Procedure: CT chest abdomen pelvis w contrast    Result Date: 7/27/2022  Narrative: CT CHEST, ABDOMEN AND PELVIS WITH IV CONTRAST INDICATION:   Melanoma, staging New melanoma diagnosis  Needs contrast for staging    COMPARISON:  None  TECHNIQUE: CT examination of the chest, abdomen and pelvis was performed  Scanning through the abdomen was performed in arterial, venous and delayed phases according a protocol spefically designed to evaluate upper abdominal viscera  Axial, sagittal, and coronal 2D reformatted images were created from the source data and submitted for interpretation  Radiation dose length product (DLP) for this visit:  1412 mGy-cm   This examination, like all CT scans performed in the [facility], was performed utilizing techniques to minimize radiation dose exposure, including the use of iterative reconstruction and automated exposure control  IV Contrast:  100 mL of iohexol (OMNIPAQUE) Enteric Contrast: Enteric contrast was not administered  FINDINGS: CHEST LUNGS:  1 1 x 0 7 cm subpleural opacity in the posterior right upper lobe (series 5, image 41)  Similar-appearing 1 4 x 0 9 cm wedge-shaped opacity in the periphery of the right middle lobe (series 5, image 69)  PLEURA:  Unremarkable  HEART/GREAT VESSELS: Heart is unremarkable for patient's age  No thoracic aortic aneurysm  MEDIASTINUM AND SHU:  Unremarkable   CHEST WALL AND LOWER NECK: Unremarkable  ABDOMEN LIVER/BILIARY TREE:  3 cm cyst   Few too small to characterize hypoattenuating lesions  Normal hepatic contours  No biliary dilatation  GALLBLADDER:  Gallbladder is surgically absent  SPLEEN:  Unremarkable  PANCREAS:  Unremarkable  ADRENAL GLANDS:  Unremarkable  KIDNEYS/URETERS:  Unremarkable  No hydronephrosis  STOMACH AND BOWEL:  There is colonic diverticulosis without evidence of acute diverticulitis  APPENDIX:  No findings to suggest appendicitis  ABDOMINOPELVIC CAVITY:  No ascites  No pneumoperitoneum  No lymphadenopathy  VESSELS:  Atherosclerotic changes are present  No evidence of aneurysm  PELVIS REPRODUCTIVE ORGANS:  Unremarkable for patient's age  URINARY BLADDER:  Unremarkable  ABDOMINAL WALL/INGUINAL REGIONS:  Small foci of induration and subcutaneous air in the ventral subcutaneous tissues related to recent injections  OSSEOUS STRUCTURES:  No acute fracture or destructive osseous lesion  Spinal degenerative changes are noted  Thoracic spinal fixation hardware  Impression: No findings of metastatic disease in the chest, abdomen and pelvis  Few small opacities in the periphery of the right lung may represent atelectasis, though 3 month follow-up is advised to confirm benignity  Workstation performed: [id number]             Assessment/Plan  Mr Jean Carlos Cheung is a 82 yr male with newly diagnosed melanoma here for disease management discussion  1  Malignant melanoma of leg, left ([address] )  I had an extensive discussion with the patient regarding his  diagnosis, prognosis, and recommendations for further management  We reviewed his melanoma history, current findings, pathology and imaging tests  I discussed that he is incompletely staged  Currently he is going had a biopsy which demonstrates at least a stage II B melanoma  I discussed that he will require a wide local excision and sentinel node biopsy for definitive intervention and staging    We placed a referral for Surgical Oncology  I discussed that even at this stage, we recommend full-body imaging, which he had  CT chest abdomen pelvis July 26, 2022 demonstrated no evidence of metastatic melanoma  We discussed that he is at risk for recurrence of his melanoma, and that final information regarding recurrence is based on final pathology and staging  We discussed that for stage II B melanoma and higher there is adjuvant treatment that is potentially available to reduce his risk of recurrence  We discussed that we will see him back in the office 2 weeks after his surgery for final treatment and surveillance recommendations  He understands that this does depend on final pathology and staging  We discussed that he will require ongoing monitoring and surveillance, both for disease recurrence as well as a new primary melanoma as well as other nonmelanoma skin cancers  This includes physical exam and labs, including a comprehensive metabolic panel, CBC with differential and LDH; periodic imaging studies with either CT chest, abdomen and pelvis or PET/CT scans every 6 months  We discussed the importance of regular cutaneous self examinations and reviewed the features of lesions that could be concerning for skin cancer  I did explain that he  is at risk for developing another primary melanoma as well  We also discussed avoidance of unnecessary sun exposure and use of sun protective clothing and sunscreen  I also recommended routine follow up and skin checks with dermatology  Family Screening: his  first-degree relatives, namely his siblings, parents, and children, have an increased risk of developing a melanoma over the general population given his  diagnosis of melanoma, and should have annual dermatologic screening     - CBC and differential; Future  - Comprehensive metabolic panel; Future  - LD,Blood; Future  - Ambulatory Referral to Surgical Oncology; Future      Mr Martha Narayanan had all his questions and concerns addressed and he knows to call with any additional issues  Thank you for allowing me to participate in Mr Maricel Mathur darryn Fong MD, PhD

## 2022-08-11 NOTE — PROGRESS NOTES
[facility]  [address] [address]  [phone]     Date of Visit: 8/10/2022  Name: Karina Fermin   YOB: 1935        Subjective    VISIT DIAGNOSIS:  Diagnoses and all orders for this visit:    Malignant melanoma of leg, left (HCC)  -     CBC and differential; Future  -     Comprehensive metabolic panel; Future  -     LD,Blood; Future  -     Ambulatory Referral to Surgical Oncology; Future        Oncology History   Malignant melanoma of leg, left ([facility] [address] )   7/13/2022 -  Cancer Staged    Staging form: Melanoma of the Skin, AJCC 8th Edition  - Clinical stage from 7/13/2022: Stage IIB (cT3b, cN0, cM0) - Signed by Lázaro Aquino MD on 8/11/2022 7/25/2022 Initial Diagnosis    Malignant melanoma of leg, left ([facility] [address] )        Cancer Staging  Malignant melanoma of leg, left ([facility] [address] )  Staging form: Melanoma of the Skin, AJCC 8th Edition  - Clinical stage from 7/13/2022: Stage IIB (cT3b, cN0, cM0) - Signed by Lázaro Aquino MD on 8/11/2022       HISTORY OF PRESENT ILLNESS: Karina Fermin is a 82 y o  male  who was recently diagnosed with melanoma identified on recent hospital administration here for follow-up  He is seen in consultation and history is obtained from him and review of records  He initially presented to the hospital with left leg redness and swelling with concern for cellulitis  It was found that the wound on his left leg did have maggots  CT scan demonstrated subcutaneous edema  He was started on antibiotics any did undergo an excisional biopsy of the wound on his left leg  Biopsy on 07/13/2022 demonstrated a 2 6 mm Breslow thickness melanoma which was ulcerated; 9 mitoses per mm2; John level for; positive margins    He underwent a CT chest abdomen and pelvis on 07/26/2022 which was negative for metastatic melanoma, however there are some peripheral right lung opacities consistent with atelectasis but follow-up is recommended with repeat CT scan  He states that the lesion had been on his leg for some number of years  At the time when he initially noted it he thought was a wart and it was about a quarter of the size as it was when he presented to the hospital   He states he kept putting a liquid/ointment on it to remove it  With application of this liquid the lesion got bigger and stat of smaller and got worse  He states that with there are number of years  Other than getting bigger he states there were no changes to it  He denies any itching, pain, or bleeding of the lesion  He describes having increased sun exposure  He denies any sunburns  He denies any blistering sunburns  He denies tanning bed use  He denies family history of melanoma, breast cancer, ovarian cancer, and pancreatic cancer  He states he had brown hair when he was younger, he has brown eyes, is [place] descent  He has not had a colonoscopy any does not follow with a PCP  He quit smoking 55 years ago  He did smoke for 50 years about 1/4 pack per day  He denies alcohol use  Denies drug use  REVIEW OF SYSTEMS:  Review of Systems   Constitutional: Negative for appetite change, fatigue, fever and unexpected weight change  HENT:   Negative for lump/mass  Eyes: Negative for icterus  Respiratory: Negative for cough, shortness of breath and wheezing  Cardiovascular: Negative for leg swelling  Gastrointestinal: Negative for abdominal pain, constipation, diarrhea, nausea and vomiting  Genitourinary: Negative for difficulty urinating and hematuria  Musculoskeletal: Negative for arthralgias, gait problem and myalgias  Skin: Positive for wound (left leg; healed)  Negative for itching and rash  No new, changing, or concerning lesions  Neurological: Negative for extremity weakness, gait problem, headaches, light-headedness and numbness  Hematological: Negative for adenopathy  MEDICATIONS:    Current Outpatient Medications:     amLODIPine (NORVASC) 10 mg tablet, Take 1 tablet (10 mg total) by mouth daily, Disp: 30 tablet, Rfl: 0    aspirin (ECOTRIN LOW STRENGTH) 81 mg EC tablet, Take 1 tablet (81 mg total) by mouth daily, Disp: 30 tablet, Rfl: 0    mirtazapine (REMERON) 15 mg tablet, Take 1 tablet (15 mg total) by mouth daily at bedtime, Disp: 30 tablet, Rfl: 0    vancomycin (VANCOCIN) 50mg/mL SOLN, Take 2 5 mL (125 mg total) by mouth every 6 (six) hours for 40 doses, Disp: 100 mL, Rfl: 0     ALLERGIES:  Allergies   Allergen Reactions    Amphetamine Rash     compounds containing amphetamine        ACTIVE PROBLEMS:  Patient Active Problem List   Diagnosis    Cellulitis    Leg mass    Type 2 diabetes mellitus with hyperglycemia, without long-term current use of insulin (HCC)    Hypertension    Encounter for competency evaluation    COVID-19    Malignant melanoma of leg, left (HCC)    Depression    C  difficile colitis          PAST MEDICAL HISTORY:   Past Medical History:   Diagnosis Date    Hx of heart artery stent         PAST SURGICAL HISTORY:  Past Surgical History:   Procedure Laterality Date    BACK SURGERY          SOCIAL HISTORY:  Social History     Socioeconomic History    Marital status: Single     Spouse name: None    Number of children: None    Years of education: None    Highest education level: None   Occupational History    None   Tobacco Use    Smoking status: Former Smoker    Smokeless tobacco: Never Used   Substance and Sexual Activity    Alcohol use: Never    Drug use: Never    Sexual activity: None   Other Topics Concern    None   Social History Narrative    None     Social Determinants of Health     Financial Resource Strain: Not on file   Food Insecurity: No Food Insecurity    Worried About Running Out of Food in the Last Year: Never true    Hyun of Food in the Last Year: Never true   Transportation Needs: No Transportation Needs  Lack of Transportation (Medical): No    Lack of Transportation (Non-Medical): No   Physical Activity: Not on file   Stress: Not on file   Social Connections: Not on file   Intimate Partner Violence: Not on file   Housing Stability: Low Risk     Unable to Pay for Housing in the Last Year: No    Number of Places Lived in the Last Year: 1    Unstable Housing in the Last Year: No        FAMILY HISTORY:  No family history on file  Objective    PHYSICAL EXAMINATION:   Blood pressure 130/80, pulse 89, temperature (!) 96 9 °F (36 1 °C), temperature source Temporal, resp  rate 16, height 5' 9" (1 753 m), weight 78 9 kg (174 lb), SpO2 93 %  Pain Score: 0-No pain     ECOG Performance Status    Flowsheet Row Most Recent Value   ECOG Performance Status 1 - Restricted in physically strenuous activity but ambulatory and able to carry out work of a light or sedentary nature, e g , light house work, office work            Physical Exam  Constitutional:       General: He is not in acute distress  Appearance: Normal appearance  He is not toxic-appearing  HENT:      Mouth/Throat:      Mouth: Mucous membranes are moist       Pharynx: Oropharynx is clear  Eyes:      General: No scleral icterus  Cardiovascular:      Rate and Rhythm: Normal rate and regular rhythm  Pulses: Normal pulses  Heart sounds: No murmur heard  No friction rub  No gallop  Pulmonary:      Effort: Pulmonary effort is normal  No respiratory distress  Breath sounds: Normal breath sounds  No wheezing or rales  Chest:   Breasts:      Right: No axillary adenopathy or supraclavicular adenopathy  Left: No axillary adenopathy or supraclavicular adenopathy  Abdominal:      General: There is no distension  Palpations: There is no mass  Tenderness: There is no abdominal tenderness  There is no rebound  Musculoskeletal:         General: No swelling or tenderness  Right lower leg: No edema        Left lower leg: No edema  Lymphadenopathy:      Head:      Right side of head: No submandibular, preauricular or posterior auricular adenopathy  Left side of head: No submandibular, preauricular or posterior auricular adenopathy  Cervical: No cervical adenopathy  Right cervical: No superficial or posterior cervical adenopathy  Left cervical: No superficial or posterior cervical adenopathy  Upper Body:      Right upper body: No supraclavicular or axillary adenopathy  Left upper body: No supraclavicular or axillary adenopathy  Lower Body: No right inguinal adenopathy  No left inguinal adenopathy  Skin:     Findings: Lesion (healing left leg biopsy site) present  No rash  Comments: Healing left leg biopsy site   Neurological:      General: No focal deficit present  Mental Status: He is alert and oriented to person, place, and time  Psychiatric:         Mood and Affect: Mood normal          Behavior: Behavior normal          Thought Content: Thought content normal          Judgment: Judgment normal          I reviewed lab data in the chart      WBC   Date Value Ref Range Status   07/23/2022 7 01 4 31 - 10 16 Thousand/uL Final   07/14/2022 10 03 4 31 - 10 16 Thousand/uL Final   07/12/2022 8 43 4 31 - 10 16 Thousand/uL Final     Hemoglobin   Date Value Ref Range Status   07/23/2022 15 6 12 0 - 17 0 g/dL Final   07/14/2022 16 3 12 0 - 17 0 g/dL Final   07/12/2022 15 3 12 0 - 17 0 g/dL Final     Platelets   Date Value Ref Range Status   07/23/2022 212 149 - 390 Thousands/uL Final   07/14/2022 298 149 - 390 Thousands/uL Final   07/12/2022 256 149 - 390 Thousands/uL Final     MCV   Date Value Ref Range Status   07/23/2022 90 82 - 98 fL Final   07/14/2022 91 82 - 98 fL Final   07/12/2022 90 82 - 98 fL Final      Potassium   Date Value Ref Range Status   07/27/2022 3 5 3 5 - 5 3 mmol/L Final   07/23/2022 3 4 (L) 3 5 - 5 3 mmol/L Final   07/14/2022 3 5 3 5 - 5 3 mmol/L Final Chloride   Date Value Ref Range Status   07/27/2022 101 96 - 108 mmol/L Final   07/23/2022 101 96 - 108 mmol/L Final   07/14/2022 105 100 - 108 mmol/L Final     CO2   Date Value Ref Range Status   07/27/2022 25 21 - 32 mmol/L Final   07/23/2022 26 21 - 32 mmol/L Final   07/14/2022 28 21 - 32 mmol/L Final     BUN   Date Value Ref Range Status   07/27/2022 13 5 - 25 mg/dL Final   07/23/2022 12 5 - 25 mg/dL Final   07/14/2022 9 5 - 25 mg/dL Final     Creatinine   Date Value Ref Range Status   07/27/2022 0 80 0 60 - 1 30 mg/dL Final     Comment:     Standardized to IDMS reference method   07/23/2022 0 84 0 60 - 1 30 mg/dL Final     Comment:     Standardized to IDMS reference method   07/14/2022 0 99 0 60 - 1 30 mg/dL Final     Comment:     Standardized to IDMS reference method     Glucose   Date Value Ref Range Status   07/27/2022 103 65 - 140 mg/dL Final     Comment:     If the patient is fasting, the ADA then defines impaired fasting glucose as > 100 mg/dL and diabetes as > or equal to 123 mg/dL  Specimen collection should occur prior to Sulfasalazine administration due to the potential for falsely depressed results  Specimen collection should occur prior to Sulfapyridine administration due to the potential for falsely elevated results  07/23/2022 103 65 - 140 mg/dL Final     Comment:     If the patient is fasting, the ADA then defines impaired fasting glucose as > 100 mg/dL and diabetes as > or equal to 123 mg/dL  Specimen collection should occur prior to Sulfasalazine administration due to the potential for falsely depressed results  Specimen collection should occur prior to Sulfapyridine administration due to the potential for falsely elevated results  07/14/2022 119 65 - 140 mg/dL Final     Comment:     If the patient is fasting, the ADA then defines impaired fasting glucose as > 100 mg/dL and diabetes as > or equal to 123 mg/dL    Specimen collection should occur prior to Sulfasalazine administration due to the potential for falsely depressed results  Specimen collection should occur prior to Sulfapyridine administration due to the potential for falsely elevated results  Calcium   Date Value Ref Range Status   07/27/2022 8 6 8 3 - 10 1 mg/dL Final   07/23/2022 8 3 8 3 - 10 1 mg/dL Final   07/14/2022 8 5 8 3 - 10 1 mg/dL Final     Albumin   Date Value Ref Range Status   07/09/2022 3 5 3 5 - 5 0 g/dL Final   04/10/2022 3 0 (L) 3 5 - 5 0 g/dL Final     Total Bilirubin   Date Value Ref Range Status   07/09/2022 0 96 0 20 - 1 00 mg/dL Final     Comment:     Use of this assay is not recommended for patients undergoing treatment with eltrombopag due to the potential for falsely elevated results  04/10/2022 0 77 0 20 - 1 00 mg/dL Final     Comment:     Use of this assay is not recommended for patients undergoing treatment with eltrombopag due to the potential for falsely elevated results  Alkaline Phosphatase   Date Value Ref Range Status   07/09/2022 79 46 - 116 U/L Final   04/10/2022 93 46 - 116 U/L Final     AST   Date Value Ref Range Status   07/09/2022 19 5 - 45 U/L Final     Comment:     Specimen collection should occur prior to Sulfasalazine administration due to the potential for falsely depressed results  04/10/2022 44 5 - 45 U/L Final     Comment:     Specimen collection should occur prior to Sulfasalazine administration due to the potential for falsely depressed results  ALT   Date Value Ref Range Status   07/09/2022 22 12 - 78 U/L Final     Comment:     Specimen collection should occur prior to Sulfasalazine administration due to the potential for falsely depressed results  04/10/2022 28 12 - 78 U/L Final     Comment:     Specimen collection should occur prior to Sulfasalazine administration due to the potential for falsely depressed results         No results found for: LDH  No results found for: TSH  No results found for: [id number]   No results found for: [address] [place] [address] IMAGING:  Procedure: CT chest abdomen pelvis w contrast    Result Date: 7/27/2022  Narrative: CT CHEST, ABDOMEN AND PELVIS WITH IV CONTRAST INDICATION:   Melanoma, staging New melanoma diagnosis  Needs contrast for staging    COMPARISON:  None  TECHNIQUE: CT examination of the chest, abdomen and pelvis was performed  Scanning through the abdomen was performed in arterial, venous and delayed phases according a protocol spefically designed to evaluate upper abdominal viscera  Axial, sagittal, and coronal 2D reformatted images were created from the source data and submitted for interpretation  Radiation dose length product (DLP) for this visit:  1412 mGy-cm   This examination, like all CT scans performed in the [facility], was performed utilizing techniques to minimize radiation dose exposure, including the use of iterative reconstruction and automated exposure control  IV Contrast:  100 mL of iohexol (OMNIPAQUE) Enteric Contrast: Enteric contrast was not administered  FINDINGS: CHEST LUNGS:  1 1 x 0 7 cm subpleural opacity in the posterior right upper lobe (series 5, image 41)  Similar-appearing 1 4 x 0 9 cm wedge-shaped opacity in the periphery of the right middle lobe (series 5, image 69)  PLEURA:  Unremarkable  HEART/GREAT VESSELS: Heart is unremarkable for patient's age  No thoracic aortic aneurysm  MEDIASTINUM AND SHU:  Unremarkable  CHEST WALL AND LOWER NECK:  Unremarkable  ABDOMEN LIVER/BILIARY TREE:  3 cm cyst   Few too small to characterize hypoattenuating lesions  Normal hepatic contours  No biliary dilatation  GALLBLADDER:  Gallbladder is surgically absent  SPLEEN:  Unremarkable  PANCREAS:  Unremarkable  ADRENAL GLANDS:  Unremarkable  KIDNEYS/URETERS:  Unremarkable  No hydronephrosis  STOMACH AND BOWEL:  There is colonic diverticulosis without evidence of acute diverticulitis  APPENDIX:  No findings to suggest appendicitis  ABDOMINOPELVIC CAVITY:  No ascites  No pneumoperitoneum    No lymphadenopathy  VESSELS:  Atherosclerotic changes are present  No evidence of aneurysm  PELVIS REPRODUCTIVE ORGANS:  Unremarkable for patient's age  URINARY BLADDER:  Unremarkable  ABDOMINAL WALL/INGUINAL REGIONS:  Small foci of induration and subcutaneous air in the ventral subcutaneous tissues related to recent injections  OSSEOUS STRUCTURES:  No acute fracture or destructive osseous lesion  Spinal degenerative changes are noted  Thoracic spinal fixation hardware  Impression: No findings of metastatic disease in the chest, abdomen and pelvis  Few small opacities in the periphery of the right lung may represent atelectasis, though 3 month follow-up is advised to confirm benignity  Workstation performed: [id number]             Assessment/Plan  Mr Merlin Dunnings is a 82 yr male with newly diagnosed melanoma here for disease management discussion  1  Malignant melanoma of leg, left ([address] )  I had an extensive discussion with the patient regarding his  diagnosis, prognosis, and recommendations for further management  We reviewed his melanoma history, current findings, pathology and imaging tests  I discussed that he is incompletely staged  Currently he is going had a biopsy which demonstrates at least a stage II B melanoma  I discussed that he will require a wide local excision and sentinel node biopsy for definitive intervention and staging  We placed a referral for Surgical Oncology  I discussed that even at this stage, we recommend full-body imaging, which he had  CT chest abdomen pelvis July 26, 2022 demonstrated no evidence of metastatic melanoma  We discussed that he is at risk for recurrence of his melanoma, and that final information regarding recurrence is based on final pathology and staging  We discussed that for stage II B melanoma and higher there is adjuvant treatment that is potentially available to reduce his risk of recurrence      We discussed that we will see him back in the office 2 weeks after his surgery for final treatment and surveillance recommendations  He understands that this does depend on final pathology and staging  We discussed that he will require ongoing monitoring and surveillance, both for disease recurrence as well as a new primary melanoma as well as other nonmelanoma skin cancers  This includes physical exam and labs, including a comprehensive metabolic panel, CBC with differential and LDH; periodic imaging studies with either CT chest, abdomen and pelvis or PET/CT scans every 6 months  We discussed the importance of regular cutaneous self examinations and reviewed the features of lesions that could be concerning for skin cancer  I did explain that he  is at risk for developing another primary melanoma as well  We also discussed avoidance of unnecessary sun exposure and use of sun protective clothing and sunscreen  I also recommended routine follow up and skin checks with dermatology  Family Screening: his  first-degree relatives, namely his siblings, parents, and children, have an increased risk of developing a melanoma over the general population given his  diagnosis of melanoma, and should have annual dermatologic screening     - CBC and differential; Future  - Comprehensive metabolic panel; Future  - LD,Blood; Future  - Ambulatory Referral to Surgical Oncology; Future      Mr Carlos Vargas had all his questions and concerns addressed and he knows to call with any additional issues  Thank you for allowing me to participate in Mr Roxanne Harris darryn Roman MD, PhD

## 2022-08-31 ENCOUNTER — CONSULT (OUTPATIENT)
Dept: SURGICAL ONCOLOGY | Facility: CLINIC | Age: 87
End: 2022-08-31
Payer: MEDICARE

## 2022-08-31 VITALS
WEIGHT: 170.3 LBS | BODY MASS INDEX: 25.15 KG/M2 | DIASTOLIC BLOOD PRESSURE: 76 MMHG | HEART RATE: 99 BPM | TEMPERATURE: 96.9 F | OXYGEN SATURATION: 96 % | RESPIRATION RATE: 16 BRPM | SYSTOLIC BLOOD PRESSURE: 122 MMHG

## 2022-08-31 DIAGNOSIS — C43.72 MALIGNANT MELANOMA OF LEG, LEFT (HCC): Primary | ICD-10-CM

## 2022-08-31 PROCEDURE — 99204 OFFICE O/P NEW MOD 45 MIN: CPT | Performed by: STUDENT IN AN ORGANIZED HEALTH CARE EDUCATION/TRAINING PROGRAM

## 2022-08-31 NOTE — PROGRESS NOTES
Surgical Oncology Consultation    [facility] CANCER CARE SURGICAL ONCOLOGY [address] [facility]  [phone]    Patient:  Brittany Contreras  1935  [id number]    Primary Care provider:  No primary care provider on file  No primary provider on file  Referring provider:  MD Karen Guzmán 34, [facility]    Diagnoses and all orders for this visit:    Malignant melanoma of leg, left [facility])  -     Ambulatory Referral to Surgical Oncology        Chief Complaint   Patient presents with    New Patient Visit       No follow-ups on file  Oncology History   Malignant melanoma of leg, left ([address] )   7/13/2022 -  Cancer Staged    Staging form: Melanoma of the Skin, AJCC 8th Edition  - Clinical stage from 7/13/2022: Stage IIB (cT3b, cN0, cM0) - Signed by Crystal Palomo MD on 8/11/2022 7/13/2022 Biopsy    A  Skin, left lower leg, incisional biopsy:  Melanoma, ulcerated  See synoptic report and note  Thickness 2 6mm   Ulceration present   9 Mitoses      7/25/2022 Initial Diagnosis    Malignant melanoma of leg, left (HCC)         History of Present Illness  :  Patient comes in with for consultation of a new melanoma  Briefly, the patient states that he had a wound that started as a mole on his left posterior calf for years  In the last several months, at least since April, the wound has grown in size  He states that the wound never bothered him and despite being taken to the emergency department and encouraged to see dermatology for consultation, he did not pursue this  He was then taken to the emergency department recently for worsening cellulitis of that extremity  He was treated with antibiotics and developed C diff for which he was also treated  He was also mainly discharged to a rehab facility due to his very poor functional status    His daughter state that his functional status has declined rapidly in the last 6 months  He had stopped bathing at home due to his wound  He was no longer cooking more mobile at all really  Since being admitted to the rehab facility, he has been unable to get out of bed  A biopsy was obtained revealing an at least 3B melanoma with a positive deep margin  Standard cross-sectional imaging was obtained revealing no evidence of metastatic disease  Again, his functional status is very poor, and he is mostly bed-bound with an ECOG of 3  He met with Dr Karyle Reasoner with Medical Oncology but his daughters were unable to be present at that visit and his understanding is poor  In fact, he was deemed unable to make decisions for himself on competency eval on his recent visit to the hospital     Review of Systems  Complete ROS Surg Onc:   Constitutional: The patient ENDORSES recent history of general fatigue, recent weight loss, poor appetite  Eyes: No complaints of visual problems, no scleral icterus  ENT: No complaints of ear pain, no hoarseness, no difficulty swallowing,  no tinnitus and no new masses in head, oral cavity, or neck  Cardiovascular: No complaints of chest pain, no palpitations, no ankle edema  Respiratory: No complaints of shortness of breath, no cough  Gastrointestinal: No complaints of jaundice, no bloody stools, no pale stools  Genitourinary: No complaints of dysuria, no hematuria, no nocturia, FREQUENT urination, no urethral discharge  Musculoskeletal: ENDORSES weakness, paralysis, joint stiffness or arthralgias  Integumentary: No complaints of rash, no new lesions  Neurological: No complaints of convulsions, no seizures, no dizziness  Hematologic/Lymphatic: No complaints of easy bruising  Endocrine:  No hot or cold intolerance  No polydipsia, polyphagia, or polyuria  Allergy/immunology:  No environmental allergies  No food allergies  Not immunocompromised        Patient Active Problem List   Diagnosis    Cellulitis    Leg mass    Type 2 diabetes mellitus with hyperglycemia, without long-term current use of insulin ([facility] )    Hypertension    Encounter for competency evaluation    COVID-19    Malignant melanoma of leg, left ([facility] )    Depression    C  difficile colitis     Past Medical History:   Diagnosis Date    Hx of heart artery stent      Past Surgical History:   Procedure Laterality Date    BACK SURGERY       No family history on file  Social History     Socioeconomic History    Marital status: Single     Spouse name: Not on file    Number of children: Not on file    Years of education: Not on file    Highest education level: Not on file   Occupational History    Not on file   Tobacco Use    Smoking status: Former Smoker    Smokeless tobacco: Never Used   Substance and Sexual Activity    Alcohol use: Never    Drug use: Never    Sexual activity: Not on file   Other Topics Concern    Not on file   Social History Narrative    Not on file     Social Determinants of Health     Financial Resource Strain: Not on file   Food Insecurity: No Food Insecurity    Worried About [employer] [employer] in the Last Year: Never true    [address] in the Last Year: Never true   Transportation Needs: No Transportation Needs    Lack of Transportation (Medical): No    Lack of Transportation (Non-Medical):  No   Physical Activity: Not on file   Stress: Not on file   Social Connections: Not on file   Intimate Partner Violence: Not on file   Housing Stability: Low Risk     Unable to Pay for Housing in the Last Year: No    Number of Places Lived in the Last Year: 1    Unstable Housing in the Last Year: No       Current Outpatient Medications:     amLODIPine (NORVASC) 10 mg tablet, Take 1 tablet (10 mg total) by mouth daily, Disp: 30 tablet, Rfl: 0    aspirin (ECOTRIN LOW STRENGTH) 81 mg EC tablet, Take 1 tablet (81 mg total) by mouth daily, Disp: 30 tablet, Rfl: 0    mirtazapine (REMERON) 15 mg tablet, Take 1 tablet (15 mg total) by mouth daily at bedtime, Disp: 30 tablet, Rfl: 0  Allergies   Allergen Reactions    Amphetamine Rash     compounds containing amphetamine       Vitals:    08/31/22 1331   BP: 122/76   Pulse: 99   Resp: 16   Temp: (!) 96 9 °F (36 1 °C)   SpO2: 96%       Physical Exam   General: Appears very deconditioned, appears stated age, in wheelchair   Skin: Dry, brittle, anicteric; excoriated  HEENT: Normocephalic, atraumatic; sclera aniceteric, mucous membranes moist; cervical nodes without adenopathy  Cardiopulmonary: RRR, Easy WOB, no BLE edema  Abd: Flat and soft, nontender, no masses appreciated, large inguinal hernia  MSK: Symmetric, no cyanosis, significant atrophy of BLE  Lymphatic: No cervical, axillary or inguinal lymphadenopathy  Neuro: Affect flat, strength signiicantly diminshed      Pathology:  TUMOR   Tumor Site  Skin of lower limb and hip: left lower leg         Histologic Type  Melanoma, not otherwise classified    Maximum Tumor (Breslow) Thickness (Millimeters)  At least: 2 6  mm     Melanoma extends to the deep tissue edge    Ulceration  Present    Anatomic (John) Level  At least level: IV    Mitotic Rate  9 mitoses per mm2   Microsatellite(s)  Not identified    Lymphovascular Invasion  Not identified    Neurotropism  Not identified    Tumor Regression  Not identified    MARGINS     Margin Status for Invasive Melanoma  Invasive melanoma present at margin    Margin(s) Involved by Invasive Melanoma  Peripheral      Deep    Margin Status for Melanoma in situ  Melanoma in situ present at margin    Margin(s) Involved by Melanoma in Situ  Peripheral    PATHOLOGIC STAGE CLASSIFICATION (pTNM, AJCC 8th Edition)     Reporting of pT categories is based on information available to the pathologist at the time the report is issued   As per the AJCC (Chapter 1, 8th Ed ) it is the managing physicians responsibility to establish the final pathologic stage based upon all pertinent information, including but potentially not limited to this pathology report  pT Category  pT not assigned (cannot be determined based on available pathological information)    Comment(s)  Immunohistochemical stains for S100, Melan-A, and HMB45 performed with adequate controls support the findings            Labs: Reviewed in EPIC    Imaging  No results found  I independently reviewed and interpreted the above laboratory and imaging data  Discussion/Summary: 83 yo male with new diagnosis of at least T3b melanoma of left lower leg  Discussed diagnosis of melanoma and explained that the patient has a deep melanoma with an unknown true depth for which a wide local excision and sentinel lymph node biopsy is recommended  Discussed that any additional therapy will be guided by the final pathology and results of the LN bx  Discussed that sun protection is best prevention for melanoma and discussed ongoing sun protection  Reinforced need for continued skin surveillance with dermatology, which the patient has not established  Risks, benefits, alternatives and prognosis discussed in full to include bleeding, infection, wound healing complications, need for re-excision, seroma, and pt and family expresses understanding  We also discussed the risks of general anesthesia given his overall poor functional status  Will need plastics consult to assist with closure  Patient's family members were unable to be present at his medical oncology visit, and he has been deemed unable to make decisions for himself  The utility of a sentinel lymph node biopsy is unclear at this juncture as I am unsure if he would be a candidate for systemic therapy  I would tend to doubt it given his overall functional status  I will send a message over to Dr Luba Caldwell to call his power of  daughter in order to discuss his options  The patient does not want any treatment at all, however, again, he has been deemed unable to make decisions    His daughter would like to discuss what I have communicated, discuss options for closure with plastics, and speak with Dr Liu before deciding on further treatment  They are welcome to call to schedule a follow-up appointment or surgery as needed

## 2022-09-16 ENCOUNTER — TELEPHONE (OUTPATIENT)
Dept: HEMATOLOGY ONCOLOGY | Facility: CLINIC | Age: 87
End: 2022-09-16

## 2022-09-16 NOTE — TELEPHONE ENCOUNTER
Appointment Cancellation Or Reschedule     Person calling in Adeel Lewis from [facility]    Provider Dr Neo Tapia   Office Visit Date and Time 10/10 11AM   Office Visit Location [facility]   Did patient want to reschedule their office appointment? If so, when was it scheduled to? No   Did you have STAR scheduled for this appointment? No   Do you need STAR set up for your new appointment? If yes, please send to "PATIENT RIDESHARE" pool for STAR rescheduling N/A   If you are cancelling appointment, can we notify STAR to cancel ride? If yes, please send to "PATIENT RIDESHARE" pool for STAR to cancel service N/A   Is this patient calling to reschedule an infusion appointment? No   When is their next infusion appointment? N/A   Is this patient a Chemo patient? No   Reason for Cancellation or Reschedule Patient is on hospice and will not be continuing care      If the patient is a treatment patient, please route this to the office nurse  If the patient is not on treatment, please route to the office MA  If the patient is a surgical oncology patient, please route to surg/onc clinical pool